# Patient Record
Sex: MALE | Race: WHITE | NOT HISPANIC OR LATINO | Employment: UNEMPLOYED | URBAN - METROPOLITAN AREA
[De-identification: names, ages, dates, MRNs, and addresses within clinical notes are randomized per-mention and may not be internally consistent; named-entity substitution may affect disease eponyms.]

---

## 2024-03-07 ENCOUNTER — APPOINTMENT (OUTPATIENT)
Dept: CARDIOLOGY | Facility: HOSPITAL | Age: 49
End: 2024-03-07
Payer: COMMERCIAL

## 2024-03-07 ENCOUNTER — HOSPITAL ENCOUNTER (INPATIENT)
Facility: HOSPITAL | Age: 49
LOS: 6 days | Discharge: HOME | End: 2024-03-13
Attending: EMERGENCY MEDICINE | Admitting: INTERNAL MEDICINE
Payer: COMMERCIAL

## 2024-03-07 ENCOUNTER — APPOINTMENT (OUTPATIENT)
Dept: GENERAL RADIOLOGY | Facility: HOSPITAL | Age: 49
End: 2024-03-07
Payer: COMMERCIAL

## 2024-03-07 DIAGNOSIS — J81.0 ACUTE PULMONARY EDEMA: ICD-10-CM

## 2024-03-07 DIAGNOSIS — E87.70 HYPERVOLEMIA, UNSPECIFIED HYPERVOLEMIA TYPE: ICD-10-CM

## 2024-03-07 DIAGNOSIS — R50.9 FEVER IN ADULT: ICD-10-CM

## 2024-03-07 DIAGNOSIS — J96.01 ACUTE HYPOXEMIC RESPIRATORY FAILURE: Primary | ICD-10-CM

## 2024-03-07 DIAGNOSIS — G93.41 ACUTE METABOLIC ENCEPHALOPATHY: ICD-10-CM

## 2024-03-07 DIAGNOSIS — J18.9 PNEUMONIA OF LEFT LOWER LOBE DUE TO INFECTIOUS ORGANISM: ICD-10-CM

## 2024-03-07 LAB
ALBUMIN SERPL-MCNC: 4 G/DL (ref 3.5–5.2)
ALBUMIN/GLOB SERPL: 1.4 G/DL
ALP SERPL-CCNC: 61 U/L (ref 39–117)
ALT SERPL W P-5'-P-CCNC: 15 U/L (ref 1–41)
AMPHET+METHAMPHET UR QL: NEGATIVE
ANION GAP SERPL CALCULATED.3IONS-SCNC: 10.6 MMOL/L (ref 5–15)
AORTIC ARCH: 3.7 CM
AORTIC DIMENSIONLESS INDEX: 0.7 (DI)
APTT PPP: 30 SECONDS (ref 22.7–35.4)
ARTERIAL PATENCY WRIST A: POSITIVE
ASCENDING AORTA: 3.5 CM
AST SERPL-CCNC: 18 U/L (ref 1–40)
ATMOSPHERIC PRESS: 747.8 MMHG
ATMOSPHERIC PRESS: 748 MMHG
ATMOSPHERIC PRESS: 748.1 MMHG
ATMOSPHERIC PRESS: 748.6 MMHG
ATMOSPHERIC PRESS: 749.8 MMHG
B PARAPERT DNA SPEC QL NAA+PROBE: NOT DETECTED
B PERT DNA SPEC QL NAA+PROBE: NOT DETECTED
BACTERIA UR QL AUTO: ABNORMAL /HPF
BARBITURATES UR QL SCN: NEGATIVE
BASE EXCESS BLDA CALC-SCNC: 4.1 MMOL/L (ref 0–2)
BASE EXCESS BLDA CALC-SCNC: 5.2 MMOL/L (ref 0–2)
BASE EXCESS BLDA CALC-SCNC: 5.2 MMOL/L (ref 0–2)
BASE EXCESS BLDA CALC-SCNC: 5.5 MMOL/L (ref 0–2)
BASE EXCESS BLDA CALC-SCNC: 6 MMOL/L (ref 0–2)
BASOPHILS # BLD AUTO: 0.04 10*3/MM3 (ref 0–0.2)
BASOPHILS NFR BLD AUTO: 0.5 % (ref 0–1.5)
BDY SITE: ABNORMAL
BENZODIAZ UR QL SCN: NEGATIVE
BH CV ECHO MEAS - ACS: 2.34 CM
BH CV ECHO MEAS - AO MAX PG: 14.4 MMHG
BH CV ECHO MEAS - AO MEAN PG: 6.9 MMHG
BH CV ECHO MEAS - AO ROOT DIAM: 3.8 CM
BH CV ECHO MEAS - AO V2 MAX: 190 CM/SEC
BH CV ECHO MEAS - AO V2 VTI: 27.8 CM
BH CV ECHO MEAS - AVA(I,D): 2.6 CM2
BH CV ECHO MEAS - EDV(CUBED): 151 ML
BH CV ECHO MEAS - EDV(MOD-SP2): 207 ML
BH CV ECHO MEAS - EDV(MOD-SP4): 172 ML
BH CV ECHO MEAS - EF(MOD-BP): 78.8 %
BH CV ECHO MEAS - EF(MOD-SP2): 76.8 %
BH CV ECHO MEAS - EF(MOD-SP4): 79.1 %
BH CV ECHO MEAS - ESV(CUBED): 25.2 ML
BH CV ECHO MEAS - ESV(MOD-SP2): 48 ML
BH CV ECHO MEAS - ESV(MOD-SP4): 36 ML
BH CV ECHO MEAS - FS: 44.9 %
BH CV ECHO MEAS - IVS/LVPW: 1.05 CM
BH CV ECHO MEAS - IVSD: 1.75 CM
BH CV ECHO MEAS - LAT PEAK E' VEL: 11.1 CM/SEC
BH CV ECHO MEAS - LV MASS(C)D: 431.6 GRAMS
BH CV ECHO MEAS - LV MAX PG: 5.1 MMHG
BH CV ECHO MEAS - LV MEAN PG: 3 MMHG
BH CV ECHO MEAS - LV V1 MAX: 113.3 CM/SEC
BH CV ECHO MEAS - LV V1 VTI: 18.4 CM
BH CV ECHO MEAS - LVIDD: 5.3 CM
BH CV ECHO MEAS - LVIDS: 2.9 CM
BH CV ECHO MEAS - LVOT AREA: 4 CM2
BH CV ECHO MEAS - LVOT DIAM: 2.26 CM
BH CV ECHO MEAS - LVPWD: 1.67 CM
BH CV ECHO MEAS - MED PEAK E' VEL: 5.7 CM/SEC
BH CV ECHO MEAS - MV A DUR: 0.18 SEC
BH CV ECHO MEAS - MV A MAX VEL: 80.9 CM/SEC
BH CV ECHO MEAS - MV DEC SLOPE: 405.4 CM/SEC2
BH CV ECHO MEAS - MV DEC TIME: 0.22 SEC
BH CV ECHO MEAS - MV E MAX VEL: 79.6 CM/SEC
BH CV ECHO MEAS - MV E/A: 0.98
BH CV ECHO MEAS - MV MAX PG: 4.2 MMHG
BH CV ECHO MEAS - MV MEAN PG: 2.17 MMHG
BH CV ECHO MEAS - MV P1/2T: 70.1 MSEC
BH CV ECHO MEAS - MV V2 VTI: 27.3 CM
BH CV ECHO MEAS - MVA(P1/2T): 3.1 CM2
BH CV ECHO MEAS - MVA(VTI): 2.7 CM2
BH CV ECHO MEAS - PA ACC TIME: 0.11 SEC
BH CV ECHO MEAS - PA V2 MAX: 131.9 CM/SEC
BH CV ECHO MEAS - QP/QS: 2.36
BH CV ECHO MEAS - RV MAX PG: 3.2 MMHG
BH CV ECHO MEAS - RV V1 MAX: 89.2 CM/SEC
BH CV ECHO MEAS - RV V1 VTI: 12.7 CM
BH CV ECHO MEAS - RVOT DIAM: 4.2 CM
BH CV ECHO MEAS - SUP REN AO DIAM: 3 CM
BH CV ECHO MEAS - SV(LVOT): 73.6 ML
BH CV ECHO MEAS - SV(MOD-SP2): 159 ML
BH CV ECHO MEAS - SV(MOD-SP4): 136 ML
BH CV ECHO MEAS - SV(RVOT): 173.4 ML
BH CV ECHO MEAS - TAPSE (>1.6): 2.7 CM
BH CV ECHO MEASUREMENTS AVERAGE E/E' RATIO: 9.48
BH CV XLRA - RV BASE: 3.8 CM
BH CV XLRA - RV LENGTH: 10.5 CM
BH CV XLRA - RV MID: 4.5 CM
BH CV XLRA - TDI S': 18.7 CM/SEC
BILIRUB SERPL-MCNC: 0.8 MG/DL (ref 0–1.2)
BILIRUB UR QL STRIP: NEGATIVE
BUN SERPL-MCNC: 17 MG/DL (ref 6–20)
BUN/CREAT SERPL: 19.1 (ref 7–25)
C PNEUM DNA NPH QL NAA+NON-PROBE: NOT DETECTED
CALCIUM SPEC-SCNC: 8.4 MG/DL (ref 8.6–10.5)
CANNABINOIDS SERPL QL: POSITIVE
CHLORIDE SERPL-SCNC: 95 MMOL/L (ref 98–107)
CLARITY UR: CLEAR
CO2 BLDA-SCNC: >34.54 MMOL/L (ref 23–27)
CO2 SERPL-SCNC: 31.4 MMOL/L (ref 22–29)
COCAINE UR QL: NEGATIVE
COLOR UR: ABNORMAL
CREAT SERPL-MCNC: 0.89 MG/DL (ref 0.76–1.27)
D DIMER PPP FEU-MCNC: 0.66 MCGFEU/ML (ref 0–0.5)
D-LACTATE SERPL-SCNC: 1.2 MMOL/L (ref 0.5–2)
DEPRECATED RDW RBC AUTO: 45.5 FL (ref 37–54)
DEVICE COMMENT: ABNORMAL
EGFRCR SERPLBLD CKD-EPI 2021: 106.4 ML/MIN/1.73
EOSINOPHIL # BLD AUTO: 0.03 10*3/MM3 (ref 0–0.4)
EOSINOPHIL NFR BLD AUTO: 0.3 % (ref 0.3–6.2)
ERYTHROCYTE [DISTWIDTH] IN BLOOD BY AUTOMATED COUNT: 13.5 % (ref 12.3–15.4)
FENTANYL UR-MCNC: NEGATIVE NG/ML
FLUAV SUBTYP SPEC NAA+PROBE: NOT DETECTED
FLUBV RNA ISLT QL NAA+PROBE: NOT DETECTED
GEN 5 2HR TROPONIN T REFLEX: 44 NG/L
GLOBULIN UR ELPH-MCNC: 2.9 GM/DL
GLUCOSE BLDC GLUCOMTR-MCNC: 120 MG/DL (ref 70–130)
GLUCOSE BLDC GLUCOMTR-MCNC: 128 MG/DL (ref 70–130)
GLUCOSE SERPL-MCNC: 124 MG/DL (ref 65–99)
GLUCOSE UR STRIP-MCNC: NEGATIVE MG/DL
GRAN CASTS URNS QL MICRO: ABNORMAL /LPF
HADV DNA SPEC NAA+PROBE: NOT DETECTED
HCO3 BLDA-SCNC: 35.7 MMOL/L (ref 22–28)
HCO3 BLDA-SCNC: 35.8 MMOL/L (ref 22–28)
HCO3 BLDA-SCNC: 36.8 MMOL/L (ref 22–28)
HCO3 BLDA-SCNC: 39.3 MMOL/L (ref 22–28)
HCO3 BLDA-SCNC: 39.7 MMOL/L (ref 22–28)
HCOV 229E RNA SPEC QL NAA+PROBE: NOT DETECTED
HCOV HKU1 RNA SPEC QL NAA+PROBE: NOT DETECTED
HCOV NL63 RNA SPEC QL NAA+PROBE: NOT DETECTED
HCOV OC43 RNA SPEC QL NAA+PROBE: NOT DETECTED
HCT VFR BLD AUTO: 51.1 % (ref 37.5–51)
HEMODILUTION: NO
HGB BLD-MCNC: 16.9 G/DL (ref 13–17.7)
HGB UR QL STRIP.AUTO: NEGATIVE
HMPV RNA NPH QL NAA+NON-PROBE: DETECTED
HPIV1 RNA ISLT QL NAA+PROBE: NOT DETECTED
HPIV2 RNA SPEC QL NAA+PROBE: NOT DETECTED
HPIV3 RNA NPH QL NAA+PROBE: NOT DETECTED
HPIV4 P GENE NPH QL NAA+PROBE: NOT DETECTED
HYALINE CASTS UR QL AUTO: ABNORMAL /LPF
IMM GRANULOCYTES # BLD AUTO: 0.05 10*3/MM3 (ref 0–0.05)
IMM GRANULOCYTES NFR BLD AUTO: 0.6 % (ref 0–0.5)
INHALED O2 CONCENTRATION: 100 %
INHALED O2 CONCENTRATION: 75 %
INR PPP: 1.1 (ref 0.9–1.1)
KETONES UR QL STRIP: NEGATIVE
L PNEUMO1 AG UR QL IA: NEGATIVE
LEUKOCYTE ESTERASE UR QL STRIP.AUTO: NEGATIVE
LYMPHOCYTES # BLD AUTO: 0.76 10*3/MM3 (ref 0.7–3.1)
LYMPHOCYTES NFR BLD AUTO: 8.8 % (ref 19.6–45.3)
Lab: ABNORMAL
M PNEUMO IGG SER IA-ACNC: NOT DETECTED
MCH RBC QN AUTO: 30.7 PG (ref 26.6–33)
MCHC RBC AUTO-ENTMCNC: 33.1 G/DL (ref 31.5–35.7)
MCV RBC AUTO: 92.7 FL (ref 79–97)
METHADONE UR QL SCN: NEGATIVE
MODALITY: ABNORMAL
MONOCYTES # BLD AUTO: 0.82 10*3/MM3 (ref 0.1–0.9)
MONOCYTES NFR BLD AUTO: 9.5 % (ref 5–12)
MRSA DNA SPEC QL NAA+PROBE: NORMAL
NEUTROPHILS NFR BLD AUTO: 6.93 10*3/MM3 (ref 1.7–7)
NEUTROPHILS NFR BLD AUTO: 80.3 % (ref 42.7–76)
NITRITE UR QL STRIP: NEGATIVE
NOTIFIED WHO: ABNORMAL
NRBC BLD AUTO-RTO: 0.3 /100 WBC (ref 0–0.2)
NT-PROBNP SERPL-MCNC: 1526 PG/ML (ref 0–450)
O2 A-A PPRESDIFF RESPIRATORY: 0.1 MMHG
O2 A-A PPRESDIFF RESPIRATORY: 0.2 MMHG
OPIATES UR QL: NEGATIVE
OXYCODONE UR QL SCN: NEGATIVE
PCO2 BLDA: 73.2 MM HG (ref 35–45)
PCO2 BLDA: 76.7 MM HG (ref 35–45)
PCO2 BLDA: 79 MM HG (ref 35–45)
PCO2 BLDA: 89.8 MM HG (ref 35–45)
PCO2 BLDA: 99.1 MM HG (ref 35–45)
PEEP RESPIRATORY: 15 CM[H2O]
PEEP RESPIRATORY: 8 CM[H2O]
PH BLDA: 7.21 PH UNITS (ref 7.35–7.45)
PH BLDA: 7.25 PH UNITS (ref 7.35–7.45)
PH BLDA: 7.26 PH UNITS (ref 7.35–7.45)
PH BLDA: 7.29 PH UNITS (ref 7.35–7.45)
PH BLDA: 7.3 PH UNITS (ref 7.35–7.45)
PH UR STRIP.AUTO: 6 [PH] (ref 5–8)
PLATELET # BLD AUTO: 164 10*3/MM3 (ref 140–450)
PMV BLD AUTO: 9.5 FL (ref 6–12)
PO2 BLDA: 61.8 MM HG (ref 80–100)
PO2 BLDA: 70.2 MM HG (ref 80–100)
PO2 BLDA: 70.8 MM HG (ref 80–100)
PO2 BLDA: 71.1 MM HG (ref 80–100)
PO2 BLDA: 73.5 MM HG (ref 80–100)
POTASSIUM SERPL-SCNC: 4.3 MMOL/L (ref 3.5–5.2)
PROCALCITONIN SERPL-MCNC: 0.21 NG/ML (ref 0–0.25)
PROT SERPL-MCNC: 6.9 G/DL (ref 6–8.5)
PROT UR QL STRIP: ABNORMAL
PROTHROMBIN TIME: 14.3 SECONDS (ref 11.7–14.2)
RBC # BLD AUTO: 5.51 10*6/MM3 (ref 4.14–5.8)
RBC # UR STRIP: ABNORMAL /HPF
READ BACK: YES
REF LAB TEST METHOD: ABNORMAL
RHINOVIRUS RNA SPEC NAA+PROBE: NOT DETECTED
RSV RNA NPH QL NAA+NON-PROBE: NOT DETECTED
S PNEUM AG SPEC QL LA: NEGATIVE
SAO2 % BLDCOA: 86 % (ref 92–98.5)
SAO2 % BLDCOA: 88.3 % (ref 92–98.5)
SAO2 % BLDCOA: 89.2 % (ref 92–98.5)
SAO2 % BLDCOA: 90.8 % (ref 92–98.5)
SAO2 % BLDCOA: 91.9 % (ref 92–98.5)
SARS-COV-2 RNA NPH QL NAA+NON-PROBE: NOT DETECTED
SET MECH RESP RATE: 16
SET MECH RESP RATE: 16
SET MECH RESP RATE: 25
SET MECH RESP RATE: 25
SINUS: 3.9 CM
SODIUM SERPL-SCNC: 137 MMOL/L (ref 136–145)
SP GR UR STRIP: 1.02 (ref 1–1.03)
SQUAMOUS #/AREA URNS HPF: ABNORMAL /HPF
STJ: 3.2 CM
TOTAL RATE: 20 BREATHS/MINUTE
TOTAL RATE: 25 BREATHS/MINUTE
TOTAL RATE: 26 BREATHS/MINUTE
TROPONIN T DELTA: 6 NG/L
TROPONIN T SERPL HS-MCNC: 38 NG/L
UROBILINOGEN UR QL STRIP: ABNORMAL
VENTILATOR MODE: ABNORMAL
VT ON VENT VENT: 500 ML
VT ON VENT VENT: 500 ML
WBC # UR STRIP: ABNORMAL /HPF
WBC NRBC COR # BLD AUTO: 8.63 10*3/MM3 (ref 3.4–10.8)

## 2024-03-07 PROCEDURE — 94660 CPAP INITIATION&MGMT: CPT

## 2024-03-07 PROCEDURE — 99285 EMERGENCY DEPT VISIT HI MDM: CPT

## 2024-03-07 PROCEDURE — 36415 COLL VENOUS BLD VENIPUNCTURE: CPT

## 2024-03-07 PROCEDURE — 94760 N-INVAS EAR/PLS OXIMETRY 1: CPT

## 2024-03-07 PROCEDURE — 85025 COMPLETE CBC W/AUTO DIFF WBC: CPT | Performed by: EMERGENCY MEDICINE

## 2024-03-07 PROCEDURE — 36600 WITHDRAWAL OF ARTERIAL BLOOD: CPT | Performed by: HOSPITALIST

## 2024-03-07 PROCEDURE — 25010000002 FUROSEMIDE PER 20 MG: Performed by: EMERGENCY MEDICINE

## 2024-03-07 PROCEDURE — 94799 UNLISTED PULMONARY SVC/PX: CPT

## 2024-03-07 PROCEDURE — 0202U NFCT DS 22 TRGT SARS-COV-2: CPT | Performed by: EMERGENCY MEDICINE

## 2024-03-07 PROCEDURE — 25010000002 FUROSEMIDE PER 20 MG: Performed by: HOSPITALIST

## 2024-03-07 PROCEDURE — 85379 FIBRIN DEGRADATION QUANT: CPT | Performed by: EMERGENCY MEDICINE

## 2024-03-07 PROCEDURE — 36600 WITHDRAWAL OF ARTERIAL BLOOD: CPT

## 2024-03-07 PROCEDURE — 83605 ASSAY OF LACTIC ACID: CPT | Performed by: EMERGENCY MEDICINE

## 2024-03-07 PROCEDURE — 80053 COMPREHEN METABOLIC PANEL: CPT | Performed by: EMERGENCY MEDICINE

## 2024-03-07 PROCEDURE — 87641 MR-STAPH DNA AMP PROBE: CPT | Performed by: EMERGENCY MEDICINE

## 2024-03-07 PROCEDURE — 84484 ASSAY OF TROPONIN QUANT: CPT | Performed by: EMERGENCY MEDICINE

## 2024-03-07 PROCEDURE — 25010000002 METHYLPREDNISOLONE PER 40 MG: Performed by: INTERNAL MEDICINE

## 2024-03-07 PROCEDURE — 25010000002 ENOXAPARIN PER 10 MG: Performed by: INTERNAL MEDICINE

## 2024-03-07 PROCEDURE — 87449 NOS EACH ORGANISM AG IA: CPT | Performed by: INTERNAL MEDICINE

## 2024-03-07 PROCEDURE — 80307 DRUG TEST PRSMV CHEM ANLYZR: CPT | Performed by: EMERGENCY MEDICINE

## 2024-03-07 PROCEDURE — 71045 X-RAY EXAM CHEST 1 VIEW: CPT

## 2024-03-07 PROCEDURE — 87040 BLOOD CULTURE FOR BACTERIA: CPT | Performed by: EMERGENCY MEDICINE

## 2024-03-07 PROCEDURE — 83880 ASSAY OF NATRIURETIC PEPTIDE: CPT | Performed by: EMERGENCY MEDICINE

## 2024-03-07 PROCEDURE — 93306 TTE W/DOPPLER COMPLETE: CPT

## 2024-03-07 PROCEDURE — 25010000002 ACETAZOLAMIDE PER 500 MG: Performed by: INTERNAL MEDICINE

## 2024-03-07 PROCEDURE — 93306 TTE W/DOPPLER COMPLETE: CPT | Performed by: INTERNAL MEDICINE

## 2024-03-07 PROCEDURE — 81001 URINALYSIS AUTO W/SCOPE: CPT | Performed by: INTERNAL MEDICINE

## 2024-03-07 PROCEDURE — 25510000001 PERFLUTREN (DEFINITY) 8.476 MG IN SODIUM CHLORIDE (PF) 0.9 % 10 ML INJECTION: Performed by: INTERNAL MEDICINE

## 2024-03-07 PROCEDURE — 94640 AIRWAY INHALATION TREATMENT: CPT

## 2024-03-07 PROCEDURE — 94664 DEMO&/EVAL PT USE INHALER: CPT

## 2024-03-07 PROCEDURE — 25010000002 CEFTRIAXONE PER 250 MG: Performed by: EMERGENCY MEDICINE

## 2024-03-07 PROCEDURE — 85730 THROMBOPLASTIN TIME PARTIAL: CPT | Performed by: EMERGENCY MEDICINE

## 2024-03-07 PROCEDURE — 94761 N-INVAS EAR/PLS OXIMETRY MLT: CPT

## 2024-03-07 PROCEDURE — 93010 ELECTROCARDIOGRAM REPORT: CPT | Performed by: INTERNAL MEDICINE

## 2024-03-07 PROCEDURE — 82803 BLOOD GASES ANY COMBINATION: CPT

## 2024-03-07 PROCEDURE — 85610 PROTHROMBIN TIME: CPT | Performed by: EMERGENCY MEDICINE

## 2024-03-07 PROCEDURE — 82803 BLOOD GASES ANY COMBINATION: CPT | Performed by: EMERGENCY MEDICINE

## 2024-03-07 PROCEDURE — 82948 REAGENT STRIP/BLOOD GLUCOSE: CPT

## 2024-03-07 PROCEDURE — 84145 PROCALCITONIN (PCT): CPT | Performed by: EMERGENCY MEDICINE

## 2024-03-07 PROCEDURE — 36600 WITHDRAWAL OF ARTERIAL BLOOD: CPT | Performed by: EMERGENCY MEDICINE

## 2024-03-07 PROCEDURE — 82803 BLOOD GASES ANY COMBINATION: CPT | Performed by: HOSPITALIST

## 2024-03-07 PROCEDURE — 93005 ELECTROCARDIOGRAM TRACING: CPT | Performed by: EMERGENCY MEDICINE

## 2024-03-07 RX ORDER — IPRATROPIUM BROMIDE AND ALBUTEROL SULFATE 2.5; .5 MG/3ML; MG/3ML
3 SOLUTION RESPIRATORY (INHALATION) ONCE
Status: COMPLETED | OUTPATIENT
Start: 2024-03-07 | End: 2024-03-07

## 2024-03-07 RX ORDER — BISACODYL 5 MG/1
5 TABLET, DELAYED RELEASE ORAL DAILY PRN
Status: DISCONTINUED | OUTPATIENT
Start: 2024-03-07 | End: 2024-03-13 | Stop reason: HOSPADM

## 2024-03-07 RX ORDER — FENTANYL CITRATE 50 UG/ML
100 INJECTION, SOLUTION INTRAMUSCULAR; INTRAVENOUS ONCE
Status: DISCONTINUED | OUTPATIENT
Start: 2024-03-07 | End: 2024-03-09

## 2024-03-07 RX ORDER — NITROGLYCERIN 0.4 MG/1
0.4 TABLET SUBLINGUAL
Status: DISCONTINUED | OUTPATIENT
Start: 2024-03-07 | End: 2024-03-13 | Stop reason: HOSPADM

## 2024-03-07 RX ORDER — ACETAMINOPHEN 650 MG/1
650 SUPPOSITORY RECTAL EVERY 4 HOURS PRN
Status: DISCONTINUED | OUTPATIENT
Start: 2024-03-07 | End: 2024-03-13 | Stop reason: HOSPADM

## 2024-03-07 RX ORDER — METHYLPREDNISOLONE SODIUM SUCCINATE 40 MG/ML
40 INJECTION, POWDER, LYOPHILIZED, FOR SOLUTION INTRAMUSCULAR; INTRAVENOUS EVERY 12 HOURS
Status: DISCONTINUED | OUTPATIENT
Start: 2024-03-07 | End: 2024-03-13 | Stop reason: HOSPADM

## 2024-03-07 RX ORDER — SODIUM CHLORIDE 0.9 % (FLUSH) 0.9 %
10 SYRINGE (ML) INJECTION AS NEEDED
Status: DISCONTINUED | OUTPATIENT
Start: 2024-03-07 | End: 2024-03-13 | Stop reason: HOSPADM

## 2024-03-07 RX ORDER — BISACODYL 10 MG
10 SUPPOSITORY, RECTAL RECTAL DAILY PRN
Status: DISCONTINUED | OUTPATIENT
Start: 2024-03-07 | End: 2024-03-13 | Stop reason: HOSPADM

## 2024-03-07 RX ORDER — ONDANSETRON 4 MG/1
4 TABLET, ORALLY DISINTEGRATING ORAL EVERY 6 HOURS PRN
Status: DISCONTINUED | OUTPATIENT
Start: 2024-03-07 | End: 2024-03-13 | Stop reason: HOSPADM

## 2024-03-07 RX ORDER — FUROSEMIDE 10 MG/ML
40 INJECTION INTRAMUSCULAR; INTRAVENOUS EVERY 12 HOURS
Status: DISCONTINUED | OUTPATIENT
Start: 2024-03-07 | End: 2024-03-09

## 2024-03-07 RX ORDER — FENTANYL CITRATE-0.9 % NACL/PF 10 MCG/ML
50-300 PLASTIC BAG, INJECTION (ML) INTRAVENOUS
Status: DISCONTINUED | OUTPATIENT
Start: 2024-03-07 | End: 2024-03-07

## 2024-03-07 RX ORDER — ENOXAPARIN SODIUM 100 MG/ML
40 INJECTION SUBCUTANEOUS DAILY
Status: DISCONTINUED | OUTPATIENT
Start: 2024-03-07 | End: 2024-03-08

## 2024-03-07 RX ORDER — IPRATROPIUM BROMIDE AND ALBUTEROL SULFATE 2.5; .5 MG/3ML; MG/3ML
3 SOLUTION RESPIRATORY (INHALATION)
Status: DISCONTINUED | OUTPATIENT
Start: 2024-03-07 | End: 2024-03-13 | Stop reason: HOSPADM

## 2024-03-07 RX ORDER — POLYETHYLENE GLYCOL 3350 17 G/17G
17 POWDER, FOR SOLUTION ORAL DAILY PRN
Status: DISCONTINUED | OUTPATIENT
Start: 2024-03-07 | End: 2024-03-13 | Stop reason: HOSPADM

## 2024-03-07 RX ORDER — ONDANSETRON 2 MG/ML
4 INJECTION INTRAMUSCULAR; INTRAVENOUS EVERY 6 HOURS PRN
Status: DISCONTINUED | OUTPATIENT
Start: 2024-03-07 | End: 2024-03-13 | Stop reason: HOSPADM

## 2024-03-07 RX ORDER — AMOXICILLIN 250 MG
2 CAPSULE ORAL 2 TIMES DAILY
Status: DISCONTINUED | OUTPATIENT
Start: 2024-03-07 | End: 2024-03-13 | Stop reason: HOSPADM

## 2024-03-07 RX ORDER — ACETAZOLAMIDE 500 MG/5ML
500 INJECTION, POWDER, LYOPHILIZED, FOR SOLUTION INTRAVENOUS ONCE
Status: COMPLETED | OUTPATIENT
Start: 2024-03-07 | End: 2024-03-07

## 2024-03-07 RX ORDER — FUROSEMIDE 10 MG/ML
80 INJECTION INTRAMUSCULAR; INTRAVENOUS ONCE
Status: COMPLETED | OUTPATIENT
Start: 2024-03-07 | End: 2024-03-07

## 2024-03-07 RX ORDER — ACETAMINOPHEN 325 MG/1
650 TABLET ORAL EVERY 4 HOURS PRN
Status: DISCONTINUED | OUTPATIENT
Start: 2024-03-07 | End: 2024-03-13 | Stop reason: HOSPADM

## 2024-03-07 RX ORDER — ALUMINA, MAGNESIA, AND SIMETHICONE 2400; 2400; 240 MG/30ML; MG/30ML; MG/30ML
15 SUSPENSION ORAL EVERY 6 HOURS PRN
Status: DISCONTINUED | OUTPATIENT
Start: 2024-03-07 | End: 2024-03-13 | Stop reason: HOSPADM

## 2024-03-07 RX ADMIN — IPRATROPIUM BROMIDE AND ALBUTEROL SULFATE 3 ML: 2.5; .5 SOLUTION RESPIRATORY (INHALATION) at 18:44

## 2024-03-07 RX ADMIN — METHYLPREDNISOLONE SODIUM SUCCINATE 40 MG: 40 INJECTION, POWDER, FOR SOLUTION INTRAMUSCULAR; INTRAVENOUS at 16:06

## 2024-03-07 RX ADMIN — CEFTRIAXONE 2000 MG: 2 INJECTION, POWDER, FOR SOLUTION INTRAMUSCULAR; INTRAVENOUS at 15:57

## 2024-03-07 RX ADMIN — ENOXAPARIN SODIUM 40 MG: 100 INJECTION SUBCUTANEOUS at 17:41

## 2024-03-07 RX ADMIN — IPRATROPIUM BROMIDE AND ALBUTEROL SULFATE 3 ML: .5; 3 SOLUTION RESPIRATORY (INHALATION) at 14:27

## 2024-03-07 RX ADMIN — IPRATROPIUM BROMIDE AND ALBUTEROL SULFATE 3 ML: 2.5; .5 SOLUTION RESPIRATORY (INHALATION) at 22:18

## 2024-03-07 RX ADMIN — PERFLUTREN 3 ML: 6.52 INJECTION, SUSPENSION INTRAVENOUS at 18:12

## 2024-03-07 RX ADMIN — ACETAZOLAMIDE 500 MG: 500 INJECTION, POWDER, LYOPHILIZED, FOR SOLUTION INTRAVENOUS at 16:07

## 2024-03-07 RX ADMIN — FUROSEMIDE 80 MG: 10 INJECTION, SOLUTION INTRAMUSCULAR; INTRAVENOUS at 14:38

## 2024-03-07 RX ADMIN — FUROSEMIDE 80 MG: 10 INJECTION, SOLUTION INTRAMUSCULAR; INTRAVENOUS at 20:02

## 2024-03-07 NOTE — ED PROVIDER NOTES
EMERGENCY DEPARTMENT ENCOUNTER  Room Number:  12/12  PCP: Provider, No Known  Independent Historians: Patient and sister      HPI:  Chief Complaint: had concerns including Shortness of Breath.     A complete HPI/ROS/PMH/PSH/SH/FH are unobtainable due to: Acutely ill and not able to provide history    Chronic or social conditions impacting patient care (Social Determinants of Health): Economic Stability (Employment, Income, Expenses, Debt, Medical Bills, Financial Resource Strain)      Context: Josh Hinojosa is a 48 y.o. male with a medical history of acute respiratory failure, cardiomyopathy, and smoking who presents to the ED c/o acute dyspnea developing over the last week.  Patient reports some cough, fevers, and weight gain.  Patient's sister tells me that he has put on quite a bit of weight in the last 1 to 2 weeks.  She also tells me that he previously was cared for at [facility] and had to be intubated twice for respiratory failure in the past with diuresis of up to 50 pounds.  Patient told his sister that his medications were discontinued but the circumstances around those are not quite clear but he has been off them for several weeks to a month.      Review of prior external notes (non-ED) -and- Review of prior external test results outside of this encounter:  Not yet available      PAST MEDICAL HISTORY  Active Ambulatory Problems     Diagnosis Date Noted    No Active Ambulatory Problems     Resolved Ambulatory Problems     Diagnosis Date Noted    No Resolved Ambulatory Problems     No Additional Past Medical History         PAST SURGICAL HISTORY  No past surgical history on file.      FAMILY HISTORY  No family history on file.      SOCIAL HISTORY  Social History     Socioeconomic History    Marital status: Single         ALLERGIES  Patient has no known allergies.      REVIEW OF SYSTEMS  Review of Systems  Included in HPI  All systems reviewed and negative except for those discussed in  HPI.      PHYSICAL EXAM    I have reviewed the triage vital signs and nursing notes.    ED Triage Vitals   Temp Heart Rate Resp BP SpO2   -- 03/07/24 1404 03/07/24 1402 -- 03/07/24 1402    97 (!) 30  (!) 49 %      Temp src Heart Rate Source Patient Position BP Location FiO2 (%)   -- 03/07/24 1404 -- -- --    Monitor          Physical Exam    Physical Exam   Constitutional: Respiratory distress noted.  Patient is drowsy but is able to speak with me and is currently protecting his airway.  HENT:  Head: Normocephalic and atraumatic.   Oropharynx: Mucous membranes are moist.   Eyes: . No scleral icterus. No conjunctival pallor.  Neck: Normal range of motion. Neck supple.   Cardiovascular: Pink warm and well perfused throughout.    Pulmonary/Chest: Tachypneic with severely diminished breath sounds throughout.  Abdominal: Soft.  Very large habitus with pitting edema of the lower abdominal wall.  Musculoskeletal: Moves all extremities equally but somewhat weakly.  Lower extremity edema bilaterally noted.  Neurological: Alert and oriented.  Nausea but following all exam requests.  Skin: Skin is pink, warm, and dry.   Psychiatric: Limited evaluation  Nursing note and vitals reviewed.             LAB RESULTS  Recent Results (from the past 24 hour(s))   Comprehensive Metabolic Panel    Collection Time: 03/07/24  2:10 PM    Specimen: Blood   Result Value Ref Range    Glucose 124 (H) 65 - 99 mg/dL    BUN 17 6 - 20 mg/dL    Creatinine 0.89 0.76 - 1.27 mg/dL    Sodium 137 136 - 145 mmol/L    Potassium 4.3 3.5 - 5.2 mmol/L    Chloride 95 (L) 98 - 107 mmol/L    CO2 31.4 (H) 22.0 - 29.0 mmol/L    Calcium 8.4 (L) 8.6 - 10.5 mg/dL    Total Protein 6.9 6.0 - 8.5 g/dL    Albumin 4.0 3.5 - 5.2 g/dL    ALT (SGPT) 15 1 - 41 U/L    AST (SGOT) 18 1 - 40 U/L    Alkaline Phosphatase 61 39 - 117 U/L    Total Bilirubin 0.8 0.0 - 1.2 mg/dL    Globulin 2.9 gm/dL    A/G Ratio 1.4 g/dL    BUN/Creatinine Ratio 19.1 7.0 - 25.0    Anion Gap 10.6 5.0 -  15.0 mmol/L    eGFR 106.4 >60.0 mL/min/1.73   Protime-INR    Collection Time: 03/07/24  2:10 PM    Specimen: Blood   Result Value Ref Range    Protime 14.3 (H) 11.7 - 14.2 Seconds    INR 1.10 0.90 - 1.10   aPTT    Collection Time: 03/07/24  2:10 PM    Specimen: Blood   Result Value Ref Range    PTT 30.0 22.7 - 35.4 seconds   BNP    Collection Time: 03/07/24  2:10 PM    Specimen: Blood   Result Value Ref Range    proBNP 1,526.0 (H) 0.0 - 450.0 pg/mL   D-dimer, Quantitative    Collection Time: 03/07/24  2:10 PM    Specimen: Blood   Result Value Ref Range    D-Dimer, Quantitative 0.66 (H) 0.00 - 0.50 MCGFEU/mL   High Sensitivity Troponin T    Collection Time: 03/07/24  2:10 PM    Specimen: Blood   Result Value Ref Range    HS Troponin T 38 (H) <22 ng/L   Lactic Acid, Plasma    Collection Time: 03/07/24  2:10 PM    Specimen: Blood   Result Value Ref Range    Lactate 1.2 0.5 - 2.0 mmol/L   Procalcitonin    Collection Time: 03/07/24  2:10 PM    Specimen: Blood   Result Value Ref Range    Procalcitonin 0.21 0.00 - 0.25 ng/mL   CBC Auto Differential    Collection Time: 03/07/24  2:10 PM    Specimen: Blood   Result Value Ref Range    WBC 8.63 3.40 - 10.80 10*3/mm3    RBC 5.51 4.14 - 5.80 10*6/mm3    Hemoglobin 16.9 13.0 - 17.7 g/dL    Hematocrit 51.1 (H) 37.5 - 51.0 %    MCV 92.7 79.0 - 97.0 fL    MCH 30.7 26.6 - 33.0 pg    MCHC 33.1 31.5 - 35.7 g/dL    RDW 13.5 12.3 - 15.4 %    RDW-SD 45.5 37.0 - 54.0 fl    MPV 9.5 6.0 - 12.0 fL    Platelets 164 140 - 450 10*3/mm3    Neutrophil % 80.3 (H) 42.7 - 76.0 %    Lymphocyte % 8.8 (L) 19.6 - 45.3 %    Monocyte % 9.5 5.0 - 12.0 %    Eosinophil % 0.3 0.3 - 6.2 %    Basophil % 0.5 0.0 - 1.5 %    Immature Grans % 0.6 (H) 0.0 - 0.5 %    Neutrophils, Absolute 6.93 1.70 - 7.00 10*3/mm3    Lymphocytes, Absolute 0.76 0.70 - 3.10 10*3/mm3    Monocytes, Absolute 0.82 0.10 - 0.90 10*3/mm3    Eosinophils, Absolute 0.03 0.00 - 0.40 10*3/mm3    Basophils, Absolute 0.04 0.00 - 0.20 10*3/mm3     Immature Grans, Absolute 0.05 0.00 - 0.05 10*3/mm3    nRBC 0.3 (H) 0.0 - 0.2 /100 WBC   Blood Gas, Arterial -    Collection Time: 03/07/24  2:20 PM    Specimen: Arterial Blood   Result Value Ref Range    Site Right Radial     Omar's Test Positive     pH, Arterial 7.263 (C) 7.350 - 7.450 pH units    pCO2, Arterial 79.0 (C) 35.0 - 45.0 mm Hg    pO2, Arterial 61.8 (L) 80.0 - 100.0 mm Hg    HCO3, Arterial 35.7 (H) 22.0 - 28.0 mmol/L    Base Excess, Arterial 4.1 (H) 0.0 - 2.0 mmol/L    O2 Saturation, Arterial 86.0 (L) 92.0 - 98.5 %    A-a DO2 0.1 mmHg    CO2 Content >34.54 (H) 23 - 27 mmol/L    Barometric Pressure for Blood Gas 749.8000 mmHg    Modality BiPap     FIO2 100 %    Ventilator Mode NIV     Set Tidal Volume 500     Set Mech Resp Rate 16     Rate 26 Breaths/minute    Notified Diamond borges     Read Back Yes     Notified Time      Hemodilution No     Device Comment sats 93. epap 6 min p 12 max p 28    ECG 12 Lead Dyspnea    Collection Time: 03/07/24  2:33 PM   Result Value Ref Range    QT Interval 402 ms    QTC Interval 506 ms         RADIOLOGY  XR Chest 1 View    Result Date: 3/7/2024  XR CHEST 1 VW-  HISTORY: Male who is 48 years-old, respiratory failure  TECHNIQUE: Frontal views of the chest  COMPARISON: None available  FINDINGS: The heart is enlarged. Pulmonary vasculature is congested. Opacities at the mid to lower lungs may reflect edema and/or pneumonia, follow-up recommended. No large pleural effusion. No pneumothorax is seen, apices are partly obscured by the chin. No acute osseous process.      As described.  This report was finalized on 3/7/2024 2:26 PM by Dr. Babak Mckeon M.D on Workstation: [id number]         MEDICATIONS GIVEN IN ER  Medications   sodium chloride 0.9 % flush 10 mL (has no administration in time range)   cefTRIAXone (ROCEPHIN) 2,000 mg in sodium chloride 0.9 % 100 mL IVPB-VTB (has no administration in time range)   furosemide (LASIX) injection 40 mg (has no administration in time  range)   ipratropium-albuterol (DUO-NEB) nebulizer solution 3 mL (3 mL Nebulization Not Given 3/7/24 1452)   ipratropium-albuterol (DUO-NEB) nebulizer solution 3 mL (has no administration in time range)   methylPREDNISolone sodium succinate (SOLU-Medrol) injection 40 mg (has no administration in time range)   nitroglycerin (NITROSTAT) SL tablet 0.4 mg (has no administration in time range)   aluminum-magnesium hydroxide-simethicone (MAALOX MAX) 400-400-40 MG/5ML suspension 15 mL (has no administration in time range)   sennosides-docusate (PERICOLACE) 8.6-50 MG per tablet 2 tablet (has no administration in time range)     And   polyethylene glycol (MIRALAX) packet 17 g (has no administration in time range)     And   bisacodyl (DULCOLAX) EC tablet 5 mg (has no administration in time range)     And   bisacodyl (DULCOLAX) suppository 10 mg (has no administration in time range)   acetaminophen (TYLENOL) tablet 650 mg (has no administration in time range)     Or   acetaminophen (TYLENOL) suppository 650 mg (has no administration in time range)   ondansetron ODT (ZOFRAN-ODT) disintegrating tablet 4 mg (has no administration in time range)     Or   ondansetron (ZOFRAN) injection 4 mg (has no administration in time range)   Enoxaparin Sodium (LOVENOX) syringe 40 mg (has no administration in time range)   Potassium Replacement - Follow Nurse / BPA Driven Protocol (has no administration in time range)   Magnesium Standard Dose Replacement - Follow Nurse / BPA Driven Protocol (has no administration in time range)   Phosphorus Replacement - Follow Nurse / BPA Driven Protocol (has no administration in time range)   Calcium Replacement - Follow Nurse / BPA Driven Protocol (has no administration in time range)   cefTRIAXone (ROCEPHIN) 2,000 mg in sodium chloride 0.9 % 100 mL IVPB-VTB (has no administration in time range)   ipratropium-albuterol (DUO-NEB) nebulizer solution 3 mL (3 mL Nebulization Given 3/7/24 1427)   furosemide  (LASIX) injection 80 mg (80 mg Intravenous Given 3/7/24 4561)         ORDERS PLACED DURING THIS VISIT:  Orders Placed This Encounter   Procedures    Respiratory Panel PCR w/COVID-19(SARS-CoV-2) ABEL/DEBBY/FER/PAD/COR/MILDRED In-House, NP Swab in UTM/VTM, 2 HR TAT - Swab, Nasopharynx    Blood Culture - Blood,    Blood Culture - Blood,    MRSA Screen, PCR (Inpatient) - Swab, Nares    S. Pneumo Ag Urine or CSF - Urine, Urine, Clean Catch    Legionella Antigen, Urine - Urine, Urine, Clean Catch    Respiratory Culture - Sputum, Cough    XR Chest 1 View    Comprehensive Metabolic Panel    Protime-INR    aPTT    Blood Gas, Arterial -    BNP    D-dimer, Quantitative    High Sensitivity Troponin T    Lactic Acid, Plasma    Procalcitonin    Urine Drug Screen - Urine, Clean Catch    CBC Auto Differential    Blood Gas, Arterial -    Urinalysis With Microscopic If Indicated (No Culture) - Urine, Clean Catch    High Sensitivity Troponin T 2Hr    NPO Diet NPO Type: Strict NPO    Monitor Blood Pressure    Vital Signs Per hospital policy    Telemetry - Place Orders & Notify Provider of Results When Patient Experiences Acute Chest Pain, Dysrhythmia or Respiratory Distress    Continuous Pulse Oximetry    Height and weight    Daily Weights    Intake and Output    Oral Care - Patient Not on NPPV & Not Intubated    Target Arousal Level RASS 0 to -1    If Patient has BG of < 80 and is symptomatic but not on an IV insulin protocol then use the Adult Hypoglycemia Treatment Orders.    Saline Lock    Place Order to Consult Intensivist For Critical Care Management (If Patient Not Admitted to Cardiology for Primary Cardiology Condition)    Use Mobility Guidelines for Advancement of Activity    Code Status and Medical Interventions:    Pulmonology (on-call MD unless specified)    Inpatient Consult to Case Management     NIPPV (CPAP or BIPAP)    POC Glucose Q4H    ECG 12 Lead Dyspnea    Adult Transthoracic Echo Complete W/ Cont if  Necessary Per Protocol    Insert Peripheral IV    Inpatient Admission    CBC & Differential         OUTPATIENT MEDICATION MANAGEMENT:  Current Facility-Administered Medications Ordered in Epic   Medication Dose Route Frequency Provider Last Rate Last Admin    acetaminophen (TYLENOL) tablet 650 mg  650 mg Oral Q4H PRN Emery Hauser MD        Or    acetaminophen (TYLENOL) suppository 650 mg  650 mg Rectal Q4H PRN Emery Hauser MD        aluminum-magnesium hydroxide-simethicone (MAALOX MAX) 400-400-40 MG/5ML suspension 15 mL  15 mL Oral Q6H PRN Emery Hauser MD        sennosides-docusate (PERICOLACE) 8.6-50 MG per tablet 2 tablet  2 tablet Oral BID Emery Hauser MD        And    polyethylene glycol (MIRALAX) packet 17 g  17 g Oral Daily PRN Emery Hauser MD        And    bisacodyl (DULCOLAX) EC tablet 5 mg  5 mg Oral Daily PRN Emery Hauser MD        And    bisacodyl (DULCOLAX) suppository 10 mg  10 mg Rectal Daily PRN Emery Hauser MD        Calcium Replacement - Follow Nurse / BPA Driven Protocol   Does not apply Emery Abrams MD        cefTRIAXone (ROCEPHIN) 2,000 mg in sodium chloride 0.9 % 100 mL IVPB-VTB  2,000 mg Intravenous Once Deny Richey MD        [START ON 3/8/2024] cefTRIAXone (ROCEPHIN) 2,000 mg in sodium chloride 0.9 % 100 mL IVPB-VTB  2,000 mg Intravenous Q24H Emery Hauser MD        Enoxaparin Sodium (LOVENOX) syringe 40 mg  40 mg Subcutaneous Daily Emery Hauser MD        furosemide (LASIX) injection 40 mg  40 mg Intravenous Q12H Emery Hauser MD        ipratropium-albuterol (DUO-NEB) nebulizer solution 3 mL  3 mL Nebulization Q4H - RT Emery Hauser MD        ipratropium-albuterol (DUO-NEB) nebulizer solution 3 mL  3 mL Nebulization Q2H PRN Emery Hauser MD        Magnesium Standard Dose Replacement - Follow Nurse / BPA Driven Protocol   Does not apply MATAN Emery Hauser MD        methylPREDNISolone  sodium succinate (SOLU-Medrol) injection 40 mg  40 mg Intravenous Q12H mEery Hauser MD        nitroglycerin (NITROSTAT) SL tablet 0.4 mg  0.4 mg Sublingual Q5 Min PRN Emery Hauser MD        ondansetron ODT (ZOFRAN-ODT) disintegrating tablet 4 mg  4 mg Oral Q6H PRN Emery Hauser MD        Or    ondansetron (ZOFRAN) injection 4 mg  4 mg Intravenous Q6H PRN Emery Hauser MD        Phosphorus Replacement - Follow Nurse / BPA Driven Protocol   Does not apply PRN Emery Hauser MD        Potassium Replacement - Follow Nurse / BPA Driven Protocol   Does not apply Emery Abrams MD        sodium chloride 0.9 % flush 10 mL  10 mL Intravenous PRN Deny Richey MD         No current [facility]-ordered outpatient medications on file.         PROCEDURES  Procedures      Total critical care time: Approximately 45 minutes    Due to a high probability of clinically significant, life threatening deterioration, the patient required my highest level of preparedness to intervene emergently and I personally spent this critical care time directly and personally managing the patient. This critical care time included obtaining a history; examining the patient; vital sign monitoring; ordering and review of studies; arranging urgent treatment with development of a management plan; evaluation of patient's response to treatment; frequent reassessment; and, discussions with other providers.  Indication: Acute hypoxemic respiratory failure  This critical care time was performed to assess and manage the high probability of imminent, life-threatening deterioration that could result in multi-organ failure. It was exclusive of separately billable procedures and treating other patients and teaching time.    Please see MDM section and the rest of the note for further information on patient assessment and treatment.        PROGRESS, DATA ANALYSIS, CONSULTS, AND MEDICAL DECISION MAKING  All labs have been  independently interpreted by me.  All radiology studies have been reviewed by me. All EKG's have been independently viewed and interpreted by me.  Discussion below represents my analysis of pertinent findings related to patient's condition, differential diagnosis, treatment plan and final disposition.    Differential diagnosis:   My differential diagnosis for dyspnea includes but is not limited to:  Asthma, COPD, pneumonia, pulmonary embolus, acute respiratory distress syndrome, pneumothorax, pleural effusion, pulmonary fibrosis, congestive heart failure, myocardial infarction, DKA, uremia, acidosis, sepsis, anemia, drug related, hyperventilation, CNS disease      Clinical Scores:                  ED Course as of 03/07/24 1454   Thu Mar 07, 2024   1421 WBC: 8.63 [RS]   1421 Hemoglobin: 16.9 [RS]   1421 Platelets: 164 [RS]   1421 Immature Grans, Absolute: 0.05 [RS]   1422 ABG reviewed: pH 7.26, pCO2 79, pO2 61.8 [RS]   1422 RADIOLOGY      Study: Single view chest  Findings: Copious interstitial findings left greater than right suggesting heart failure plus or minus overlying pneumonia  I independently viewed and interpreted these images contemporaneously with treatment.    [RS]   1424 Late entry:   BiPAP initiated shortly after patient arrival [RS]   1425 Patient tolerating BiPAP well.  Work of breathing improved.  Mental status improved.  Plan admission to the ICU.  We will cover with ceftriaxone and check MRSA swab for left lower lobe pneumonia.  Furosemide rather than IV fluids ordered given the patient's general volume overloaded status. [RS]   1426 pH, Arterial(!!): 7.263 [RS]   1426 pO2, Arterial(!): 61.8 [RS]   1426 pCO2, Arterial(!!): 79.0 [RS]   1436 CONSULT        Provider: Dr. Hauser - ICU    Discussion: Reviewed patient history, ED presentation and evaluation as well as response to therapies and therapies initiated in the ED.  Agreeable to accept the patient to the ICU for further evaluation and  treatment    Agreeable c treatment and planned disposition.         [RS]   1438 EKG           EKG time: 1433  Rhythm/Rate: Sinus, 95  P waves and MO: JACKIE within normal limits  QRS, axis: IVCD consistent with right bundle branch block  ST and T waves: ST/T wave repolarization abnormality without clear evidence of STEMI    Interpreted Contemporaneously by me, independently viewed  Comparison: Unavailable   [RS]   1449 Lactate: 1.2 [RS]   1449 D-Dimer, Quant(!): 0.66 [RS]   1449 INR: 1.10 [RS]   1454 BUN: 17 [RS]   1454 Creatinine: 0.89 [RS]   1454 Sodium: 137 [RS]   1454 Potassium: 4.3 [RS]   1454 ALT (SGPT): 15 [RS]   1454 AST (SGOT): 18 [RS]   1454 Total Bilirubin: 0.8 [RS]   1454 HS Troponin T(!): 38 [RS]   1454 proBNP(!): 1,526.0 [RS]      ED Course User Index  [RS] Deny Richey MD         Prescription drug monitoring program review:     AS OF 14:54 EST VITALS:    BP - 142/97  HR - 95  TEMP - (!) 101.1 °F (38.4 °C) (Tympanic)  O2 SATS - 94%    COMPLEXITY OF CARE  The patient requires admission.      DIAGNOSIS  Final diagnoses:   Acute hypoxemic respiratory failure   Fever in adult   Acute metabolic encephalopathy   Acute pulmonary edema   Pneumonia of left lower lobe due to infectious organism   Hypervolemia, unspecified hypervolemia type         DISPOSITION  ED Disposition       ED Disposition   Decision to Admit    Condition   --    Comment   Level of Care: Critical Care [6]   Diagnosis: Acute hypoxemic respiratory failure [[id number]]   Admitting Physician: ANGELA GONZALEZ [[id number]]   Certification: I Certify That Inpatient Hospital Services Are Medically Necessary For Greater Than 2 Midnights                    ADMISSION    Discussed treatment plan and reason for admission with pt/family and admitting physician.  Pt/family voiced understanding of the plan for admission for further testing/treatment as needed.       Please note that portions of this document were completed with a voice recognition  program.    Note Disclaimer: At [facility], we believe that sharing information builds trust and better relationships. You are receiving this note because you recently visited [facility]. It is possible you will see health information before a provider has talked with you about it. This kind of information can be easy to misunderstand. To help you fully understand what it means for your health, we urge you to discuss this note with your provider.         Deny Richey MD  03/07/24 3198

## 2024-03-07 NOTE — ED TRIAGE NOTES
Patient from home via PV reporting increasing shortness of breath for 5 days. Patient's sister reports his oxygen saturation was 65% on room air. States he uses a Bipap, has a history of pulmonary edema. Patient placed on oxygen and taken to room immediately. Reports patient developed a cough with bloody sputum last night.

## 2024-03-07 NOTE — PLAN OF CARE
Goal Outcome Evaluation:                   Admitted to CICU. Patient is arousable and follows commands with aggressive stimulation. ABG slowly improving. Bolanos placed.

## 2024-03-07 NOTE — H&P
Patient Identification:  Name: Josh Hinojosa  Age: 48 y.o.  Sex: male  :  1975  MRN: [id number]                     Primary Care Physician: Provider, No Known    Reason for visit:   Altered mental status and shortness of breath    History of Present Illness:   Josh is a 48-year-old male who presents with altered mental status and shortness of breath.  Has a history of cardiomyopathy and continues to smoke cigarettes and marijuana on a regular basis.  Has had multiple hospitalizations predominantly at [facility] for respiratory failure and pulmonary edema.  Family says that he has been intubated twice for respiratory failure.  They said that he was doing okay up until recently when medicines were discontinued but unclear if this was due to medicines being stopped by his primary care physician at [place] or if he just stopped following up and they did not continue to refill his medicines.  Confused with hypercapnic failure and chest x-ray shows congestion left greater than right.  Discussed with the ER physician.  Unable to obtain any history from patient due to altered mental status.  On NIPPV and increased work of breathing has stabilized.        Past Medical History:  No past medical history on file.  Past Surgical History:  No past surgical history on file.   Home Meds:  (Not in a hospital admission)      Allergies:  No Known Allergies  Immunizations:  Immunization History   Administered Date(s) Administered    COVID-19 (PFIZER) Purple Cap Monovalent 2021, 2021     Social History:   Social History     Social History Narrative    Not on file     Social History     Tobacco Use    Smoking status: Not on file    Smokeless tobacco: Not on file   Substance Use Topics    Alcohol use: Not on file     Family History:  No family history on file.     Review of Systems  Unobtainable with altered mental status    Objective:  tMax 24 hrs: Temp (24hrs), Av.1 °F (38.4 °C),  "Min:101.1 °F (38.4 °C), Max:101.1 °F (38.4 °C)    Vitals Ranges:   Temp:  [101.1 °F (38.4 °C)] 101.1 °F (38.4 °C)  Heart Rate:  [91-97] 95  Resp:  [30] 30  BP: (139-142)/(91-97) 142/97      Exam:  /97   Pulse 95   Temp (!) 101.1 °F (38.4 °C) (Tympanic)   Resp (!) 30   SpO2 94%     GENERAL APPEARANCE:   Well developed  Morbidly obese  Appears acutely ill  EYES:    PERRL                                                                           Conjunctiva normal  Sclera non-icteric.  HENT:   Atraumatic, normocephalic  External ears and nose normal  Moist mucous membranes and no ulcers  NECK:  Thyroid not enlarged  Trachea midline   RESPIRATORY:    Mildly labored breathing   Diminished bilateral breath sounds  Bibasilar rales. No wheezing  No dullness  CARDIOVASCULAR:    RRR  Normal S1, S2  No murmur  Lower extremity edema: +    Peripheral pulses present.    GI:   Bowel sounds normal  Abdomen soft , non-distended, non-tender  MUSCULOSKELETAL:  Normal movement of extremities  No tenderness, no deformities  No clubbing or cyanosis   Skin:    No visible rashes  No palpable nodules.  No mottling.   PSYCHIATRIC:  Lethargic  NEUROLOGIC:   Cranial nerves II through XII grossly intact.  Sensation intact.  .     Data Review:  Labs reviewed        Invalid input(s): \"LABALBU\", \"PROT\"  Results from last 7 days   Lab Units 03/07/24  1410   WBC 10*3/mm3 8.63   HEMOGLOBIN g/dL 16.9   HEMATOCRIT % 51.1*   PLATELETS 10*3/mm3 164     Results from last 7 days   Lab Units 03/07/24  1420   PH, ARTERIAL pH units 7.263*   PO2 ART mm Hg 61.8*   PCO2, ARTERIAL mm Hg 79.0*   HCO3 ART mmol/L 35.7*         Procalitonin Results:            Imaging reviewed  Chest x-ray reviewed shows vascular congestion, cardiomegaly and left lower airspace disease.          Assessment:  Acute hypoxemic and hypercapnic respiratory failure with respiratory acidosis  Pulmonary vascular congestion with acute on chronic CHF  Volume overload  Metabolic " encephalopathy  Left lower lobe infiltrate/pneumonia versus asymmetric edema  Persistent tobacco and marijuana use  Fever 101        Plan:  Admit to intensive care.  Continue NIPPV for work of breathing and repeat ABG for follow-up of hypercapnic failure.  If worsens may require intubation and mechanical ventilation.  Diuresis for volume overload and pulmonary edema.  Awaiting lactic acid and procalcitonin level.  Normal white blood cell count but with left shift, altered mental status and questionable left lower infiltrate will empirically start antibiotic for potential infectious component.  Does not need fluid bolus for sepsis as he is volume overloaded.  Order blood, urine and sputum cultures.  Check Legionella and strep pneumo urinary antigens.  2D echo for evaluation of heart failure.  Control blood pressure.  Control glucose.  DVT prophylaxis.        Emery Hauser MD  [facility], [facility]  Pulmonary and Critical Care Medicine    3/7/2024  14:39 EST

## 2024-03-07 NOTE — PROGRESS NOTES
Clinical Pharmacy Services: Medication History    Josh Hinojosa is a 48 y.o. male presenting to [facility] for   Chief Complaint   Patient presents with    Shortness of Breath       He  has no past medical history on file.    Allergies as of 03/07/2024    (No Known Allergies)       Medication information was obtained from: Patient   Pharmacy and Phone Number:     Prior to Admission Medications       None              Medication notes: Patient confirmed he is not taking any medications at this time.     This medication list is complete to the best of my knowledge as of 3/7/2024    Please call if questions.    Harshil Rivera  Medication History Technician  [phone]    3/7/2024 15:24 EST

## 2024-03-08 LAB
ANION GAP SERPL CALCULATED.3IONS-SCNC: 6 MMOL/L (ref 5–15)
ARTERIAL PATENCY WRIST A: POSITIVE
ARTERIAL PATENCY WRIST A: POSITIVE
ATMOSPHERIC PRESS: 746.3 MMHG
ATMOSPHERIC PRESS: 747.5 MMHG
BASE EXCESS BLDA CALC-SCNC: 6.2 MMOL/L (ref 0–2)
BASE EXCESS BLDA CALC-SCNC: 6.7 MMOL/L (ref 0–2)
BDY SITE: ABNORMAL
BDY SITE: ABNORMAL
BUN SERPL-MCNC: 20 MG/DL (ref 6–20)
BUN/CREAT SERPL: 20.4 (ref 7–25)
CALCIUM SPEC-SCNC: 8.7 MG/DL (ref 8.6–10.5)
CHLORIDE SERPL-SCNC: 95 MMOL/L (ref 98–107)
CO2 BLDA-SCNC: >34.54 MMOL/L (ref 23–27)
CO2 BLDA-SCNC: >34.54 MMOL/L (ref 23–27)
CO2 SERPL-SCNC: 36 MMOL/L (ref 22–29)
CREAT SERPL-MCNC: 0.98 MG/DL (ref 0.76–1.27)
DEPRECATED RDW RBC AUTO: 47.5 FL (ref 37–54)
DEVICE COMMENT: ABNORMAL
DEVICE COMMENT: ABNORMAL
EGFRCR SERPLBLD CKD-EPI 2021: 95.7 ML/MIN/1.73
ERYTHROCYTE [DISTWIDTH] IN BLOOD BY AUTOMATED COUNT: 13.7 % (ref 12.3–15.4)
GLUCOSE BLDC GLUCOMTR-MCNC: 116 MG/DL (ref 70–130)
GLUCOSE BLDC GLUCOMTR-MCNC: 128 MG/DL (ref 70–130)
GLUCOSE BLDC GLUCOMTR-MCNC: 130 MG/DL (ref 70–130)
GLUCOSE BLDC GLUCOMTR-MCNC: 134 MG/DL (ref 70–130)
GLUCOSE SERPL-MCNC: 133 MG/DL (ref 65–99)
HCO3 BLDA-SCNC: 36.2 MMOL/L (ref 22–28)
HCO3 BLDA-SCNC: 37.5 MMOL/L (ref 22–28)
HCT VFR BLD AUTO: 53.7 % (ref 37.5–51)
HEMODILUTION: NO
HEMODILUTION: NO
HGB BLD-MCNC: 17.7 G/DL (ref 13–17.7)
INHALED O2 CONCENTRATION: 50 %
INHALED O2 CONCENTRATION: 70 %
INSPIRATORY TIME: 1
Lab: ABNORMAL
Lab: ABNORMAL
MCH RBC QN AUTO: 30.9 PG (ref 26.6–33)
MCHC RBC AUTO-ENTMCNC: 33 G/DL (ref 31.5–35.7)
MCV RBC AUTO: 93.9 FL (ref 79–97)
MODALITY: ABNORMAL
MODALITY: ABNORMAL
NOTIFIED WHO: ABNORMAL
NOTIFIED WHO: ABNORMAL
O2 A-A PPRESDIFF RESPIRATORY: 0.2 MMHG
O2 A-A PPRESDIFF RESPIRATORY: 0.2 MMHG
PCO2 BLDA: 68.2 MM HG (ref 35–45)
PCO2 BLDA: 75.2 MM HG (ref 35–45)
PEEP RESPIRATORY: 12 CM[H2O]
PH BLDA: 7.31 PH UNITS (ref 7.35–7.45)
PH BLDA: 7.33 PH UNITS (ref 7.35–7.45)
PLATELET # BLD AUTO: 183 10*3/MM3 (ref 140–450)
PMV BLD AUTO: 9.8 FL (ref 6–12)
PO2 BLDA: 60.6 MM HG (ref 80–100)
PO2 BLDA: 72.1 MM HG (ref 80–100)
POTASSIUM SERPL-SCNC: 4.3 MMOL/L (ref 3.5–5.2)
QT INTERVAL: 402 MS
QTC INTERVAL: 506 MS
RBC # BLD AUTO: 5.72 10*6/MM3 (ref 4.14–5.8)
READ BACK: YES
READ BACK: YES
SAO2 % BLDCOA: 87.8 % (ref 92–98.5)
SAO2 % BLDCOA: 91.6 % (ref 92–98.5)
SET MECH RESP RATE: 28
SET MECH RESP RATE: 28
SODIUM SERPL-SCNC: 137 MMOL/L (ref 136–145)
TOTAL RATE: 28 BREATHS/MINUTE
TOTAL RATE: 28 BREATHS/MINUTE
VENTILATOR MODE: ABNORMAL
WBC NRBC COR # BLD AUTO: 8.46 10*3/MM3 (ref 3.4–10.8)

## 2024-03-08 PROCEDURE — 94664 DEMO&/EVAL PT USE INHALER: CPT

## 2024-03-08 PROCEDURE — 94761 N-INVAS EAR/PLS OXIMETRY MLT: CPT

## 2024-03-08 PROCEDURE — 94799 UNLISTED PULMONARY SVC/PX: CPT

## 2024-03-08 PROCEDURE — 85027 COMPLETE CBC AUTOMATED: CPT

## 2024-03-08 PROCEDURE — 82803 BLOOD GASES ANY COMBINATION: CPT

## 2024-03-08 PROCEDURE — 36600 WITHDRAWAL OF ARTERIAL BLOOD: CPT

## 2024-03-08 PROCEDURE — 82948 REAGENT STRIP/BLOOD GLUCOSE: CPT

## 2024-03-08 PROCEDURE — 25010000002 ENOXAPARIN PER 10 MG: Performed by: INTERNAL MEDICINE

## 2024-03-08 PROCEDURE — 80048 BASIC METABOLIC PNL TOTAL CA: CPT

## 2024-03-08 PROCEDURE — 25010000002 FUROSEMIDE PER 20 MG: Performed by: INTERNAL MEDICINE

## 2024-03-08 PROCEDURE — 25010000002 METHYLPREDNISOLONE PER 40 MG: Performed by: INTERNAL MEDICINE

## 2024-03-08 PROCEDURE — 94660 CPAP INITIATION&MGMT: CPT

## 2024-03-08 PROCEDURE — 25010000002 CEFTRIAXONE PER 250 MG: Performed by: INTERNAL MEDICINE

## 2024-03-08 RX ORDER — ENOXAPARIN SODIUM 100 MG/ML
40 INJECTION SUBCUTANEOUS EVERY 12 HOURS
Status: DISCONTINUED | OUTPATIENT
Start: 2024-03-08 | End: 2024-03-13 | Stop reason: HOSPADM

## 2024-03-08 RX ADMIN — IPRATROPIUM BROMIDE AND ALBUTEROL SULFATE 3 ML: 2.5; .5 SOLUTION RESPIRATORY (INHALATION) at 07:58

## 2024-03-08 RX ADMIN — CEFTRIAXONE 2000 MG: 2 INJECTION, POWDER, FOR SOLUTION INTRAMUSCULAR; INTRAVENOUS at 16:53

## 2024-03-08 RX ADMIN — ACETAMINOPHEN 325MG 650 MG: 325 TABLET ORAL at 16:53

## 2024-03-08 RX ADMIN — METHYLPREDNISOLONE SODIUM SUCCINATE 40 MG: 40 INJECTION, POWDER, FOR SOLUTION INTRAMUSCULAR; INTRAVENOUS at 03:29

## 2024-03-08 RX ADMIN — ENOXAPARIN SODIUM 40 MG: 100 INJECTION SUBCUTANEOUS at 20:29

## 2024-03-08 RX ADMIN — ACETAMINOPHEN 325MG 650 MG: 325 TABLET ORAL at 20:36

## 2024-03-08 RX ADMIN — DOCUSATE SODIUM 50MG AND SENNOSIDES 8.6MG 2 TABLET: 8.6; 5 TABLET, FILM COATED ORAL at 20:32

## 2024-03-08 RX ADMIN — METHYLPREDNISOLONE SODIUM SUCCINATE 40 MG: 40 INJECTION, POWDER, FOR SOLUTION INTRAMUSCULAR; INTRAVENOUS at 16:53

## 2024-03-08 RX ADMIN — FUROSEMIDE 40 MG: 10 INJECTION, SOLUTION INTRAMUSCULAR; INTRAVENOUS at 20:29

## 2024-03-08 RX ADMIN — IPRATROPIUM BROMIDE AND ALBUTEROL SULFATE 3 ML: 2.5; .5 SOLUTION RESPIRATORY (INHALATION) at 21:14

## 2024-03-08 RX ADMIN — ENOXAPARIN SODIUM 40 MG: 100 INJECTION SUBCUTANEOUS at 08:02

## 2024-03-08 RX ADMIN — IPRATROPIUM BROMIDE AND ALBUTEROL SULFATE 3 ML: 2.5; .5 SOLUTION RESPIRATORY (INHALATION) at 11:40

## 2024-03-08 RX ADMIN — IPRATROPIUM BROMIDE AND ALBUTEROL SULFATE 3 ML: 2.5; .5 SOLUTION RESPIRATORY (INHALATION) at 23:29

## 2024-03-08 RX ADMIN — IPRATROPIUM BROMIDE AND ALBUTEROL SULFATE 3 ML: 2.5; .5 SOLUTION RESPIRATORY (INHALATION) at 03:06

## 2024-03-08 RX ADMIN — IPRATROPIUM BROMIDE AND ALBUTEROL SULFATE 3 ML: 2.5; .5 SOLUTION RESPIRATORY (INHALATION) at 15:33

## 2024-03-08 RX ADMIN — FUROSEMIDE 40 MG: 10 INJECTION, SOLUTION INTRAMUSCULAR; INTRAVENOUS at 08:02

## 2024-03-08 NOTE — PAYOR COMM NOTE
Josh Hinojosa (48 y.o. Male)                            ATTENTION; INPATIENT AUTH REQUEST - ICD 10 - J96.01 - J18.9                       FACILITY NPI - [id number] - [facility], [address] [place]. [address]                    PHYSICIAN NPI - [id number] - DR. GONZALEZ [address] [place]. [address]                    REPLY TO UR DEPT  [phone] OR CALL   AFTAB MUÑIZ LPRAYMOND           Date of Birth   1975    Social Security Number       Address   [address] [address]    Home Phone   [phone]    MRN   [id number]       Sabianist   Nondenominational    Marital Status   Single                            Admission Date   3/7/24    Admission Type   Emergency    Admitting Provider   Emery Gonzalez MD    Attending Provider   Emery Gonzalez MD    Department, Room/Bed   [facility] CARDIAC INTENSIVE CARE, 3010/1       Discharge Date       Discharge Disposition       Discharge Destination                                 Attending Provider: Emery Gonzalez MD    Allergies: No Known Allergies    Isolation: Enh Drop/Con, Contact   Infection: COVID (rule out) (03/07/24), Human Metapneumovirus  (03/07/24)   Code Status: CPR    Ht: --   Wt: 162 kg (358 lb 0.4 oz)    Admission Cmt: None   Principal Problem: Acute hypoxemic respiratory failure [J96.01]                   Active Insurance as of 3/7/2024       Primary Coverage       Payor Plan Insurance Group Employer/Plan Group    [facility] [facility]        Payor Plan Address Payor Plan Phone Number Payor Plan Fax Number Effective Dates    [address] [address]   3/7/2024 - None Entered    [facility] [phone]         Subscriber Name Subscriber Birth Date Member ID       JOSH HINOJOSA 1975 [id number]                     Emergency Contacts        (Rel.) Home Phone Work Phone Mobile Phone    elizabeth washington (Sister) -- -- [phone]                 History & Physical         Emery Hauser MD at 24 1438                      Patient Identification:  Name: Josh Hinojosa  Age: 48 y.o.  Sex: male  :  1975  MRN: [id number]                     Primary Care Physician: Provider, No Known    Reason for visit:   Altered mental status and shortness of breath    History of Present Illness:   Josh is a 48-year-old male who presents with altered mental status and shortness of breath.  Has a history of cardiomyopathy and continues to smoke cigarettes and marijuana on a regular basis.  Has had multiple hospitalizations predominantly at [facility] for respiratory failure and pulmonary edema.  Family says that he has been intubated twice for respiratory failure.  They said that he was doing okay up until recently when medicines were discontinued but unclear if this was due to medicines being stopped by his primary care physician at [facility] or if he just stopped following up and they did not continue to refill his medicines.  Confused with hypercapnic failure and chest x-ray shows congestion left greater than right.  Discussed with the ER physician.  Unable to obtain any history from patient due to altered mental status.  On NIPPV and increased work of breathing has stabilized.        Past Medical History:  No past medical history on file.  Past Surgical History:  No past surgical history on file.   Home Meds:  (Not in a hospital admission)      Allergies:  No Known Allergies  Immunizations:  Immunization History   Administered Date(s) Administered    COVID-19 (PFIZER) Purple Cap Monovalent 2021, 2021     Social History:   Social History     Social History Narrative    Not on file     Social History     Tobacco Use    Smoking status: Not on file    Smokeless tobacco: Not on file   Substance Use Topics    Alcohol use: Not on file     Family History:  No family history on file.     Review of Systems  Unobtainable with altered mental  "status    Objective:  tMax 24 hrs: Temp (24hrs), Av.1 °F (38.4 °C), Min:101.1 °F (38.4 °C), Max:101.1 °F (38.4 °C)    Vitals Ranges:   Temp:  [101.1 °F (38.4 °C)] 101.1 °F (38.4 °C)  Heart Rate:  [91-97] 95  Resp:  [30] 30  BP: (139-142)/(91-97) 142/97      Exam:  /97   Pulse 95   Temp (!) 101.1 °F (38.4 °C) (Tympanic)   Resp (!) 30   SpO2 94%     GENERAL APPEARANCE:   Well developed  Morbidly obese  Appears acutely ill  EYES:    PERRL                                                                           Conjunctiva normal  Sclera non-icteric.  HENT:   Atraumatic, normocephalic  External ears and nose normal  Moist mucous membranes and no ulcers  NECK:  Thyroid not enlarged  Trachea midline   RESPIRATORY:    Mildly labored breathing   Diminished bilateral breath sounds  Bibasilar rales. No wheezing  No dullness  CARDIOVASCULAR:    RRR  Normal S1, S2  No murmur  Lower extremity edema: +    Peripheral pulses present.    GI:   Bowel sounds normal  Abdomen soft , non-distended, non-tender  MUSCULOSKELETAL:  Normal movement of extremities  No tenderness, no deformities  No clubbing or cyanosis   Skin:    No visible rashes  No palpable nodules.  No mottling.   PSYCHIATRIC:  Lethargic  NEUROLOGIC:   Cranial nerves II through XII grossly intact.  Sensation intact.  .     Data Review:  Labs reviewed        Invalid input(s): \"LABALBU\", \"PROT\"  Results from last 7 days   Lab Units 24  1410   WBC 10*3/mm3 8.63   HEMOGLOBIN g/dL 16.9   HEMATOCRIT % 51.1*   PLATELETS 10*3/mm3 164     Results from last 7 days   Lab Units 24  1420   PH, ARTERIAL pH units 7.263*   PO2 ART mm Hg 61.8*   PCO2, ARTERIAL mm Hg 79.0*   HCO3 ART mmol/L 35.7*         Procalitonin Results:            Imaging reviewed  Chest x-ray reviewed shows vascular congestion, cardiomegaly and left lower airspace disease.          Assessment:  Acute hypoxemic and hypercapnic respiratory failure with respiratory acidosis  Pulmonary " vascular congestion with acute on chronic CHF  Volume overload  Metabolic encephalopathy  Left lower lobe infiltrate/pneumonia versus asymmetric edema  Persistent tobacco and marijuana use  Fever 101        Plan:  Admit to intensive care.  Continue NIPPV for work of breathing and repeat ABG for follow-up of hypercapnic failure.  If worsens may require intubation and mechanical ventilation.  Diuresis for volume overload and pulmonary edema.  Awaiting lactic acid and procalcitonin level.  Normal white blood cell count but with left shift, altered mental status and questionable left lower infiltrate will empirically start antibiotic for potential infectious component.  Does not need fluid bolus for sepsis as he is volume overloaded.  Order blood, urine and sputum cultures.  Check Legionella and strep pneumo urinary antigens.  2D echo for evaluation of heart failure.  Control blood pressure.  Control glucose.  DVT prophylaxis.        Emery Hauser MD  [facility], [facility]  Pulmonary and Critical Care Medicine    3/7/2024  14:39 EST    Electronically signed by Emery Hauser MD at 03/07/24 1532          Emergency Department Notes        Deny Richey MD at 03/07/24 1407           EMERGENCY DEPARTMENT ENCOUNTER  Room Number:  12/12  PCP: Provider, No Known  Independent Historians: Patient and sister      HPI:  Chief Complaint: had concerns including Shortness of Breath.     A complete HPI/ROS/PMH/PSH/SH/FH are unobtainable due to: Acutely ill and not able to provide history    Chronic or social conditions impacting patient care (Social Determinants of Health): Economic Stability (Employment, Income, Expenses, Debt, Medical Bills, Financial Resource Strain)      Context: Josh Hinojosa is a 48 y.o. male with a medical history of acute respiratory failure, cardiomyopathy, and smoking who presents to the ED c/o acute dyspnea developing over the last week.  Patient reports some cough, fevers, and weight gain.   Patient's sister tells me that he has put on quite a bit of weight in the last 1 to 2 weeks.  She also tells me that he previously was cared for at [facility] and had to be intubated twice for respiratory failure in the past with diuresis of up to 50 pounds.  Patient told his sister that his medications were discontinued but the circumstances around those are not quite clear but he has been off them for several weeks to a month.      Review of prior external notes (non-ED) -and- Review of prior external test results outside of this encounter:  Not yet available      PAST MEDICAL HISTORY  Active Ambulatory Problems     Diagnosis Date Noted    No Active Ambulatory Problems     Resolved Ambulatory Problems     Diagnosis Date Noted    No Resolved Ambulatory Problems     No Additional Past Medical History         PAST SURGICAL HISTORY  No past surgical history on file.      FAMILY HISTORY  No family history on file.      SOCIAL HISTORY  Social History     Socioeconomic History    Marital status: Single         ALLERGIES  Patient has no known allergies.      REVIEW OF SYSTEMS  Review of Systems  Included in HPI  All systems reviewed and negative except for those discussed in HPI.      PHYSICAL EXAM    I have reviewed the triage vital signs and nursing notes.    ED Triage Vitals   Temp Heart Rate Resp BP SpO2   -- 03/07/24 1404 03/07/24 1402 -- 03/07/24 1402    97 (!) 30  (!) 49 %      Temp src Heart Rate Source Patient Position BP Location FiO2 (%)   -- 03/07/24 1404 -- -- --    Monitor          Physical Exam    Physical Exam   Constitutional: Respiratory distress noted.  Patient is drowsy but is able to speak with me and is currently protecting his airway.  HENT:  Head: Normocephalic and atraumatic.   Oropharynx: Mucous membranes are moist.   Eyes: . No scleral icterus. No conjunctival pallor.  Neck: Normal range of motion. Neck supple.   Cardiovascular: Pink warm and well perfused throughout.     Pulmonary/Chest: Tachypneic with severely diminished breath sounds throughout.  Abdominal: Soft.  Very large habitus with pitting edema of the lower abdominal wall.  Musculoskeletal: Moves all extremities equally but somewhat weakly.  Lower extremity edema bilaterally noted.  Neurological: Alert and oriented.  Nausea but following all exam requests.  Skin: Skin is pink, warm, and dry.   Psychiatric: Limited evaluation  Nursing note and vitals reviewed.             LAB RESULTS  Recent Results (from the past 24 hour(s))   Comprehensive Metabolic Panel    Collection Time: 03/07/24  2:10 PM    Specimen: Blood   Result Value Ref Range    Glucose 124 (H) 65 - 99 mg/dL    BUN 17 6 - 20 mg/dL    Creatinine 0.89 0.76 - 1.27 mg/dL    Sodium 137 136 - 145 mmol/L    Potassium 4.3 3.5 - 5.2 mmol/L    Chloride 95 (L) 98 - 107 mmol/L    CO2 31.4 (H) 22.0 - 29.0 mmol/L    Calcium 8.4 (L) 8.6 - 10.5 mg/dL    Total Protein 6.9 6.0 - 8.5 g/dL    Albumin 4.0 3.5 - 5.2 g/dL    ALT (SGPT) 15 1 - 41 U/L    AST (SGOT) 18 1 - 40 U/L    Alkaline Phosphatase 61 39 - 117 U/L    Total Bilirubin 0.8 0.0 - 1.2 mg/dL    Globulin 2.9 gm/dL    A/G Ratio 1.4 g/dL    BUN/Creatinine Ratio 19.1 7.0 - 25.0    Anion Gap 10.6 5.0 - 15.0 mmol/L    eGFR 106.4 >60.0 mL/min/1.73   Protime-INR    Collection Time: 03/07/24  2:10 PM    Specimen: Blood   Result Value Ref Range    Protime 14.3 (H) 11.7 - 14.2 Seconds    INR 1.10 0.90 - 1.10   aPTT    Collection Time: 03/07/24  2:10 PM    Specimen: Blood   Result Value Ref Range    PTT 30.0 22.7 - 35.4 seconds   BNP    Collection Time: 03/07/24  2:10 PM    Specimen: Blood   Result Value Ref Range    proBNP 1,526.0 (H) 0.0 - 450.0 pg/mL   D-dimer, Quantitative    Collection Time: 03/07/24  2:10 PM    Specimen: Blood   Result Value Ref Range    D-Dimer, Quantitative 0.66 (H) 0.00 - 0.50 MCGFEU/mL   High Sensitivity Troponin T    Collection Time: 03/07/24  2:10 PM    Specimen: Blood   Result Value Ref Range    HS  Troponin T 38 (H) <22 ng/L   Lactic Acid, Plasma    Collection Time: 03/07/24  2:10 PM    Specimen: Blood   Result Value Ref Range    Lactate 1.2 0.5 - 2.0 mmol/L   Procalcitonin    Collection Time: 03/07/24  2:10 PM    Specimen: Blood   Result Value Ref Range    Procalcitonin 0.21 0.00 - 0.25 ng/mL   CBC Auto Differential    Collection Time: 03/07/24  2:10 PM    Specimen: Blood   Result Value Ref Range    WBC 8.63 3.40 - 10.80 10*3/mm3    RBC 5.51 4.14 - 5.80 10*6/mm3    Hemoglobin 16.9 13.0 - 17.7 g/dL    Hematocrit 51.1 (H) 37.5 - 51.0 %    MCV 92.7 79.0 - 97.0 fL    MCH 30.7 26.6 - 33.0 pg    MCHC 33.1 31.5 - 35.7 g/dL    RDW 13.5 12.3 - 15.4 %    RDW-SD 45.5 37.0 - 54.0 fl    MPV 9.5 6.0 - 12.0 fL    Platelets 164 140 - 450 10*3/mm3    Neutrophil % 80.3 (H) 42.7 - 76.0 %    Lymphocyte % 8.8 (L) 19.6 - 45.3 %    Monocyte % 9.5 5.0 - 12.0 %    Eosinophil % 0.3 0.3 - 6.2 %    Basophil % 0.5 0.0 - 1.5 %    Immature Grans % 0.6 (H) 0.0 - 0.5 %    Neutrophils, Absolute 6.93 1.70 - 7.00 10*3/mm3    Lymphocytes, Absolute 0.76 0.70 - 3.10 10*3/mm3    Monocytes, Absolute 0.82 0.10 - 0.90 10*3/mm3    Eosinophils, Absolute 0.03 0.00 - 0.40 10*3/mm3    Basophils, Absolute 0.04 0.00 - 0.20 10*3/mm3    Immature Grans, Absolute 0.05 0.00 - 0.05 10*3/mm3    nRBC 0.3 (H) 0.0 - 0.2 /100 WBC   Blood Gas, Arterial -    Collection Time: 03/07/24  2:20 PM    Specimen: Arterial Blood   Result Value Ref Range    Site Right Radial     Omar's Test Positive     pH, Arterial 7.263 (C) 7.350 - 7.450 pH units    pCO2, Arterial 79.0 (C) 35.0 - 45.0 mm Hg    pO2, Arterial 61.8 (L) 80.0 - 100.0 mm Hg    HCO3, Arterial 35.7 (H) 22.0 - 28.0 mmol/L    Base Excess, Arterial 4.1 (H) 0.0 - 2.0 mmol/L    O2 Saturation, Arterial 86.0 (L) 92.0 - 98.5 %    A-a DO2 0.1 mmHg    CO2 Content >34.54 (H) 23 - 27 mmol/L    Barometric Pressure for Blood Gas 749.8000 mmHg    Modality BiPap     FIO2 100 %    Ventilator Mode NIV     Set Tidal Volume 500     Set  [facility] Resp Rate 16     Rate 26 Breaths/minute    Notified Who amado borges     Read Back Yes     Notified Time      Hemodilution No     Device Comment sats 93. epap 6 min p 12 max p 28    ECG 12 Lead Dyspnea    Collection Time: 03/07/24  2:33 PM   Result Value Ref Range    QT Interval 402 ms    QTC Interval 506 ms         RADIOLOGY  XR Chest 1 View    Result Date: 3/7/2024  XR CHEST 1 VW-  HISTORY: Male who is 48 years-old, respiratory failure  TECHNIQUE: Frontal views of the chest  COMPARISON: None available  FINDINGS: The heart is enlarged. Pulmonary vasculature is congested. Opacities at the mid to lower lungs may reflect edema and/or pneumonia, follow-up recommended. No large pleural effusion. No pneumothorax is seen, apices are partly obscured by the chin. No acute osseous process.      As described.  This report was finalized on 3/7/2024 2:26 PM by Dr. Babak Mckeon M.D on Workstation: [id number]         MEDICATIONS GIVEN IN ER  Medications   sodium chloride 0.9 % flush 10 mL (has no administration in time range)   cefTRIAXone (ROCEPHIN) 2,000 mg in sodium chloride 0.9 % 100 mL IVPB-VTB (has no administration in time range)   furosemide (LASIX) injection 40 mg (has no administration in time range)   ipratropium-albuterol (DUO-NEB) nebulizer solution 3 mL (3 mL Nebulization Not Given 3/7/24 1452)   ipratropium-albuterol (DUO-NEB) nebulizer solution 3 mL (has no administration in time range)   methylPREDNISolone sodium succinate (SOLU-Medrol) injection 40 mg (has no administration in time range)   nitroglycerin (NITROSTAT) SL tablet 0.4 mg (has no administration in time range)   aluminum-magnesium hydroxide-simethicone (MAALOX MAX) 400-400-40 MG/5ML suspension 15 mL (has no administration in time range)   sennosides-docusate (PERICOLACE) 8.6-50 MG per tablet 2 tablet (has no administration in time range)     And   polyethylene glycol (MIRALAX) packet 17 g (has no administration in time range)     And   bisacodyl  (DULCOLAX) EC tablet 5 mg (has no administration in time range)     And   bisacodyl (DULCOLAX) suppository 10 mg (has no administration in time range)   acetaminophen (TYLENOL) tablet 650 mg (has no administration in time range)     Or   acetaminophen (TYLENOL) suppository 650 mg (has no administration in time range)   ondansetron ODT (ZOFRAN-ODT) disintegrating tablet 4 mg (has no administration in time range)     Or   ondansetron (ZOFRAN) injection 4 mg (has no administration in time range)   Enoxaparin Sodium (LOVENOX) syringe 40 mg (has no administration in time range)   Potassium Replacement - Follow Nurse / BPA Driven Protocol (has no administration in time range)   Magnesium Standard Dose Replacement - Follow Nurse / BPA Driven Protocol (has no administration in time range)   Phosphorus Replacement - Follow Nurse / BPA Driven Protocol (has no administration in time range)   Calcium Replacement - Follow Nurse / BPA Driven Protocol (has no administration in time range)   cefTRIAXone (ROCEPHIN) 2,000 mg in sodium chloride 0.9 % 100 mL IVPB-VTB (has no administration in time range)   ipratropium-albuterol (DUO-NEB) nebulizer solution 3 mL (3 mL Nebulization Given 3/7/24 1427)   furosemide (LASIX) injection 80 mg (80 mg Intravenous Given 3/7/24 1438)         ORDERS PLACED DURING THIS VISIT:  Orders Placed This Encounter   Procedures    Respiratory Panel PCR w/COVID-19(SARS-CoV-2) ABEL/DEBBY/FER/PAD/COR/MILDRED In-House, NP Swab in UTM/VTM, 2 HR TAT - Swab, Nasopharynx    Blood Culture - Blood,    Blood Culture - Blood,    MRSA Screen, PCR (Inpatient) - Swab, Nares    S. Pneumo Ag Urine or CSF - Urine, Urine, Clean Catch    Legionella Antigen, Urine - Urine, Urine, Clean Catch    Respiratory Culture - Sputum, Cough    XR Chest 1 View    Comprehensive Metabolic Panel    Protime-INR    aPTT    Blood Gas, Arterial -    BNP    D-dimer, Quantitative    High Sensitivity Troponin T    Lactic Acid, Plasma    Procalcitonin     Urine Drug Screen - Urine, Clean Catch    CBC Auto Differential    Blood Gas, Arterial -    Urinalysis With Microscopic If Indicated (No Culture) - Urine, Clean Catch    High Sensitivity Troponin T 2Hr    NPO Diet NPO Type: Strict NPO    Monitor Blood Pressure    Vital Signs Per hospital policy    Telemetry - Place Orders & Notify Provider of Results When Patient Experiences Acute Chest Pain, Dysrhythmia or Respiratory Distress    Continuous Pulse Oximetry    Height and weight    Daily Weights    Intake and Output    Oral Care - Patient Not on NPPV & Not Intubated    Target Arousal Level RASS 0 to -1    If Patient has BG of < 80 and is symptomatic but not on an IV insulin protocol then use the Adult Hypoglycemia Treatment Orders.    Saline Lock    Place Order to Consult Intensivist For Critical Care Management (If Patient Not Admitted to Cardiology for Primary Cardiology Condition)    Use Mobility Guidelines for Advancement of Activity    Code Status and Medical Interventions:    Pulmonology (on-call MD unless specified)    Inpatient Consult to Case Management     NIPPV (CPAP or BIPAP)    POC Glucose Q4H    ECG 12 Lead Dyspnea    Adult Transthoracic Echo Complete W/ Cont if Necessary Per Protocol    Insert Peripheral IV    Inpatient Admission    CBC & Differential         OUTPATIENT MEDICATION MANAGEMENT:  Current Facility-Administered Medications Ordered in Epic   Medication Dose Route Frequency Provider Last Rate Last Admin    acetaminophen (TYLENOL) tablet 650 mg  650 mg Oral Q4H PRN Emery Hauser MD        Or    acetaminophen (TYLENOL) suppository 650 mg  650 mg Rectal Q4H PRN Emery Hauser MD        aluminum-magnesium hydroxide-simethicone (MAALOX MAX) 400-400-40 MG/5ML suspension 15 mL  15 mL Oral Q6H PRN Emery Hauser MD        sennosides-docusate (PERICOLACE) 8.6-50 MG per tablet 2 tablet  2 tablet Oral BID Emery Hauser MD        And    polyethylene glycol  (MIRALAX) packet 17 g  17 g Oral Daily PRN Emery Hauser MD        And    bisacodyl (DULCOLAX) EC tablet 5 mg  5 mg Oral Daily PRN Emery Hauser MD        And    bisacodyl (DULCOLAX) suppository 10 mg  10 mg Rectal Daily PRN Emery Hauser MD        Calcium Replacement - Follow Nurse / BPA Driven Protocol   Does not apply PRN Emery Hauser MD        cefTRIAXone (ROCEPHIN) 2,000 mg in sodium chloride 0.9 % 100 mL IVPB-VTB  2,000 mg Intravenous Once Deny Richey MD        [START ON 3/8/2024] cefTRIAXone (ROCEPHIN) 2,000 mg in sodium chloride 0.9 % 100 mL IVPB-VTB  2,000 mg Intravenous Q24H Emery Hauser MD        Enoxaparin Sodium (LOVENOX) syringe 40 mg  40 mg Subcutaneous Daily Emery Hauser MD        furosemide (LASIX) injection 40 mg  40 mg Intravenous Q12H Emery Hauser MD        ipratropium-albuterol (DUO-NEB) nebulizer solution 3 mL  3 mL Nebulization Q4H - RT Emery Hauser MD        ipratropium-albuterol (DUO-NEB) nebulizer solution 3 mL  3 mL Nebulization Q2H PRN Emery Hauser MD        Magnesium Standard Dose Replacement - Follow Nurse / BPA Driven Protocol   Does not apply PRN Emery Hauser MD        methylPREDNISolone sodium succinate (SOLU-Medrol) injection 40 mg  40 mg Intravenous Q12H Emery Hauser MD        nitroglycerin (NITROSTAT) SL tablet 0.4 mg  0.4 mg Sublingual Q5 Min PRN Emery Hauser MD        ondansetron ODT (ZOFRAN-ODT) disintegrating tablet 4 mg  4 mg Oral Q6H PRN Emery Hauser MD        Or    ondansetron (ZOFRAN) injection 4 mg  4 mg Intravenous Q6H PRN Emery Hauser MD        Phosphorus Replacement - Follow Nurse / BPA Driven Protocol   Does not apply PRN Emery Hauser MD        Potassium Replacement - Follow Nurse / BPA Driven Protocol   Does not apply PRN Emery Hauser MD        sodium chloride 0.9 % flush 10 mL  10 mL Intravenous PRN Deny Richey MD         No current  [facility]-ordered outpatient medications on file.         PROCEDURES  Procedures      Total critical care time: Approximately 45 minutes    Due to a high probability of clinically significant, life threatening deterioration, the patient required my highest level of preparedness to intervene emergently and I personally spent this critical care time directly and personally managing the patient. This critical care time included obtaining a history; examining the patient; vital sign monitoring; ordering and review of studies; arranging urgent treatment with development of a management plan; evaluation of patient's response to treatment; frequent reassessment; and, discussions with other providers.  Indication: Acute hypoxemic respiratory failure  This critical care time was performed to assess and manage the high probability of imminent, life-threatening deterioration that could result in multi-organ failure. It was exclusive of separately billable procedures and treating other patients and teaching time.    Please see MDM section and the rest of the note for further information on patient assessment and treatment.        PROGRESS, DATA ANALYSIS, CONSULTS, AND MEDICAL DECISION MAKING  All labs have been independently interpreted by me.  All radiology studies have been reviewed by me. All EKG's have been independently viewed and interpreted by me.  Discussion below represents my analysis of pertinent findings related to patient's condition, differential diagnosis, treatment plan and final disposition.    Differential diagnosis:   My differential diagnosis for dyspnea includes but is not limited to:  Asthma, COPD, pneumonia, pulmonary embolus, acute respiratory distress syndrome, pneumothorax, pleural effusion, pulmonary fibrosis, congestive heart failure, myocardial infarction, DKA, uremia, acidosis, sepsis, anemia, drug related, hyperventilation, CNS disease      Clinical Scores:                  ED Course as of 03/07/24  1454   Thu Mar 07, 2024   1421 WBC: 8.63 [RS]   1421 Hemoglobin: 16.9 [RS]   1421 Platelets: 164 [RS]   1421 Immature Grans, Absolute: 0.05 [RS]   1422 ABG reviewed: pH 7.26, pCO2 79, pO2 61.8 [RS]   1422 RADIOLOGY      Study: Single view chest  Findings: Copious interstitial findings left greater than right suggesting heart failure plus or minus overlying pneumonia  I independently viewed and interpreted these images contemporaneously with treatment.    [RS]   1424 Late entry:   BiPAP initiated shortly after patient arrival [RS]   1425 Patient tolerating BiPAP well.  Work of breathing improved.  Mental status improved.  Plan admission to the ICU.  We will cover with ceftriaxone and check MRSA swab for left lower lobe pneumonia.  Furosemide rather than IV fluids ordered given the patient's general volume overloaded status. [RS]   1426 pH, Arterial(!!): 7.263 [RS]   1426 pO2, Arterial(!): 61.8 [RS]   1426 pCO2, Arterial(!!): 79.0 [RS]   1436 CONSULT        Provider: Dr. Hauser - ICU    Discussion: Reviewed patient history, ED presentation and evaluation as well as response to therapies and therapies initiated in the ED.  Agreeable to accept the patient to the ICU for further evaluation and treatment    Agreeable c treatment and planned disposition.         [RS]   1438 EKG           EKG time: 1433  Rhythm/Rate: Sinus, 95  P waves and WA: JACKIE within normal limits  QRS, axis: IVCD consistent with right bundle branch block  ST and T waves: ST/T wave repolarization abnormality without clear evidence of STEMI    Interpreted Contemporaneously by me, independently viewed  Comparison: Unavailable   [RS]   1449 Lactate: 1.2 [RS]   1449 D-Dimer, Quant(!): 0.66 [RS]   1449 INR: 1.10 [RS]   1454 BUN: 17 [RS]   1454 Creatinine: 0.89 [RS]   1454 Sodium: 137 [RS]   1454 Potassium: 4.3 [RS]   1454 ALT (SGPT): 15 [RS]   1454 AST (SGOT): 18 [RS]   1454 Total Bilirubin: 0.8 [RS]   1454 HS Troponin T(!): 38 [RS]   1454 proBNP(!):  1,526.0 [RS]      ED Course User Index  [RS] Deny Richey MD         Prescription drug monitoring program review:     AS OF 14:54 EST VITALS:    BP - 142/97  HR - 95  TEMP - (!) 101.1 °F (38.4 °C) (Tympanic)  O2 SATS - 94%    COMPLEXITY OF CARE  The patient requires admission.      DIAGNOSIS  Final diagnoses:   Acute hypoxemic respiratory failure   Fever in adult   Acute metabolic encephalopathy   Acute pulmonary edema   Pneumonia of left lower lobe due to infectious organism   Hypervolemia, unspecified hypervolemia type         DISPOSITION  ED Disposition       ED Disposition   Decision to Admit    Condition   --    Comment   Level of Care: Critical Care [6]   Diagnosis: Acute hypoxemic respiratory failure [[id number]]   Admitting Physician: ANGELA GONZALEZ [[id number]]   Certification: I Certify That Inpatient Hospital Services Are Medically Necessary For Greater Than 2 Midnights                    ADMISSION    Discussed treatment plan and reason for admission with pt/family and admitting physician.  Pt/family voiced understanding of the plan for admission for further testing/treatment as needed.       Please note that portions of this document were completed with a voice recognition program.    Note Disclaimer: At [facility], we believe that sharing information builds trust and better relationships. You are receiving this note because you recently visited [facility]. It is possible you will see health information before a provider has talked with you about it. This kind of information can be easy to misunderstand. To help you fully understand what it means for your health, we urge you to discuss this note with your provider.         Deny Richey MD  03/07/24 1452      Electronically signed by Deny Richey MD at 03/07/24 3748       Martha Mckinnon, RN at 03/07/24 1402          Patient from home via PV reporting increasing shortness of breath for 5 days. Patient's sister reports his oxygen saturation was  65% on room air. States he uses a Bipap, has a history of pulmonary edema. Patient placed on oxygen and taken to room immediately. Reports patient developed a cough with bloody sputum last night.     Electronically signed by Martha Mckinnon RN at 03/07/24 1405       Vital Signs (last day)       Date/Time Temp Temp src Pulse Resp BP Patient Position SpO2    03/08/24 0900 -- -- 84 -- 157/81 -- 90    03/08/24 0800 -- -- 82 -- 153/82 -- 93    03/08/24 0758 -- -- 83 28 153/82 -- 91    03/08/24 0752 99.6 (37.6) Axillary -- -- -- -- --    03/08/24 0700 -- -- 81 -- 145/84 -- 92    03/08/24 0630 -- -- 81 -- 140/79 -- 93    03/08/24 0615 -- -- 77 -- -- -- 93    03/08/24 0613 -- -- 78 -- 135/83 -- 92    03/08/24 0545 -- -- 80 -- -- -- 97    03/08/24 0530 -- -- 79 -- 145/84 -- 92    03/08/24 0515 -- -- 81 -- -- -- 93    03/08/24 0500 -- -- 77 -- 138/83 -- 93    03/08/24 0445 -- -- 78 -- -- -- 92    03/08/24 0430 -- -- 75 -- 147/80 -- 94    03/08/24 0415 98.9 (37.2) Axillary 79 -- -- -- 95    03/08/24 0400 -- -- 78 -- 138/78 -- 95    03/08/24 0345 -- -- 75 -- -- -- 93    03/08/24 0330 -- -- 78 -- 144/86 -- 94    03/08/24 0315 -- -- 73 28 -- -- 93    03/08/24 0306 -- -- 73 28 -- -- 93    03/08/24 0300 -- -- 77 -- 139/86 -- 92    03/08/24 0245 -- -- 76 -- -- -- 93    03/08/24 0230 -- -- 78 -- 134/81 -- 93    03/08/24 0215 -- -- 76 -- -- -- 93    03/08/24 0200 -- -- 76 -- 132/86 -- 93    03/08/24 0145 -- -- 74 -- -- -- 95    03/08/24 0130 -- -- 76 -- 126/77 -- 96    03/08/24 0115 -- -- 76 -- -- -- 94    03/08/24 0100 -- -- 77 -- 126/82 -- 94    03/08/24 0045 -- -- 79 -- -- -- 97    03/08/24 0030 -- -- 78 -- 129/84 -- 99    03/08/24 0015 98.6 (37) Axillary 78 -- -- -- 93    03/08/24 0000 -- -- 80 -- 127/77 -- 94    03/07/24 2345 -- -- 79 -- -- -- 94    03/07/24 2330 -- -- 80 -- 121/82 -- 94    03/07/24 2315 -- -- 82 -- -- -- 95    03/07/24 2300 -- -- 76 -- 131/76 -- 96    03/07/24 2245 -- -- 73 -- -- -- --    03/07/24 2241 -- --  75 28 -- -- 93    03/07/24 2235 -- -- 75 30 -- -- 94    03/07/24 2230 -- -- 79 -- 151/98 -- 94    03/07/24 2218 -- -- 75 30 -- -- 95    03/07/24 2215 -- -- 75 -- -- -- 93    03/07/24 2200 -- -- 74 -- 152/92 -- 95    03/07/24 2153 -- -- 74 30 -- -- 93    03/07/24 2130 -- -- 76 -- 147/90 -- 94    03/07/24 2115 -- -- 78 -- -- -- 93    03/07/24 2100 -- -- 77 -- 140/84 -- 94    03/07/24 2045 -- -- 77 -- -- -- 94    03/07/24 2030 -- -- 78 -- 138/86 -- 96    03/07/24 2015 -- -- 72 -- -- -- 97    03/07/24 2000 99.8 (37.7) Oral 79 -- 158/82 -- 95    03/07/24 1844 -- -- 80 25 -- -- 99    03/07/24 1831 -- -- 83 25 -- -- 98    03/07/24 1811 -- -- 86 -- 156/82 -- --    03/07/24 1726 100.5 (38.1) Oral -- -- -- -- --    03/07/24 1701 -- -- 86 25 156/82 -- 95    03/07/24 1638 -- -- 86 22 -- -- 96    03/07/24 1606 -- -- 88 -- 145/87 -- 90    03/07/24 1427 -- -- 96 26 136/90 -- 90    03/07/24 1424 -- -- 95 -- -- -- 94    03/07/24 1421 -- -- 91 -- 142/97 -- 97    03/07/24 1409 101.1 (38.4) Tympanic 93 -- 139/91 -- 93    03/07/24 1404 -- -- 97 -- -- -- 87    03/07/24 1402 -- -- -- 30 -- -- 49          Oxygen Therapy (last day)       Date/Time SpO2 Device (Oxygen Therapy) Flow (L/min) Oxygen Concentration (%) ETCO2 (mmHg)    03/08/24 0929 -- -- -- 60 --    03/08/24 0900 90 -- -- -- --    03/08/24 0800 93 NPPV/NIV -- 70 --    03/08/24 0758 91 NPPV/NIV -- 70 --    03/08/24 0700 92 -- -- -- --    03/08/24 0630 93 -- -- -- --    03/08/24 0615 93 -- -- -- --    03/08/24 0613 92 -- -- -- --    03/08/24 0545 97 -- -- -- --    03/08/24 0530 92 -- -- -- --    03/08/24 0515 93 -- -- -- --    03/08/24 0500 93 -- -- -- --    03/08/24 0445 92 -- -- -- --    03/08/24 0430 94 -- -- -- --    03/08/24 0415 95 -- -- -- --    03/08/24 0400 95 NPPV/NIV -- 70 --    03/08/24 0345 93 -- -- -- --    03/08/24 0330 94 -- -- -- --    03/08/24 0315 93 NPPV/NIV -- 70 --    03/08/24 0306 93 NPPV/NIV -- 70 --    03/08/24 0300 92 -- -- -- --    03/08/24 0245 93 -- --  -- --    03/08/24 0230 93 -- -- -- --    03/08/24 0215 93 -- -- -- --    03/08/24 0200 93 -- -- -- --    03/08/24 0145 95 -- -- -- --    03/08/24 0130 96 -- -- -- --    03/08/24 0115 94 -- -- -- --    03/08/24 0100 94 -- -- -- --    03/08/24 0045 97 -- -- -- --    03/08/24 0030 99 -- -- -- --    03/08/24 0015 93 -- -- -- --    03/08/24 0000 94 NPPV/NIV -- 70 --    03/07/24 2345 94 -- -- -- --    03/07/24 2330 94 -- -- -- --    03/07/24 2315 95 -- -- -- --    03/07/24 2300 96 -- -- -- --    03/07/24 2241 93 NPPV/NIV -- 70 --    03/07/24 2235 94 NPPV/NIV -- 70 --    03/07/24 2230 94 -- -- -- --    03/07/24 2218 95 NPPV/NIV -- 75 --    03/07/24 2215 93 -- -- -- --    03/07/24 2200 95 -- -- -- --    03/07/24 2153 93 NPPV/NIV -- 75 --    03/07/24 2130 94 -- -- -- --    03/07/24 2115 93 -- -- -- --    03/07/24 2100 94 -- -- -- --    03/07/24 2045 94 -- -- -- --    03/07/24 2030 96 -- -- -- --    03/07/24 2015 97 -- -- -- --    03/07/24 2000 95 NPPV/NIV -- 80 --    03/07/24 1844 99 NPPV/NIV -- 80 --    03/07/24 1831 98 NPPV/NIV -- 90 --    03/07/24 1701 95 NPPV/NIV -- 100 --    03/07/24 1638 96 -- -- -- --    03/07/24 1606 90 -- -- -- --    03/07/24 1427 90 NPPV/NIV -- 100 --    03/07/24 1424 94 NPPV/NIV -- 100 --    03/07/24 1421 97 -- -- -- --    03/07/24 1409 93 -- -- -- --    03/07/24 1404 87 nonrebreather mask 15 -- --    03/07/24 1402 49 -- -- -- --          Lines, Drains & Airways       Active LDAs       Name Placement date Placement time Site Days    Peripheral IV 03/07/24 1438 Anterior;Distal;Left;Upper Arm 03/07/24  1438  Arm  less than 1    Peripheral IV 03/07/24 1732 Anterior;Left Forearm 03/07/24  1732  Forearm  less than 1    Urethral Catheter Straight-tip 03/07/24  1710  -- less than 1                  Facility-Administered Medications as of 3/8/2024   Medication Dose Route Frequency Provider Last Rate Last Admin    acetaminophen (TYLENOL) tablet 650 mg  650 mg Oral Q4H PRN Emery Hauser MD        Or     acetaminophen (TYLENOL) suppository 650 mg  650 mg Rectal Q4H PRN Emery Hauser MD        [COMPLETED] acetaZOLAMIDE (DIAMOX) injection 500 mg  500 mg Intravenous Once Emery Hauser MD   500 mg at 03/07/24 1607    aluminum-magnesium hydroxide-simethicone (MAALOX MAX) 400-400-40 MG/5ML suspension 15 mL  15 mL Oral Q6H PRN Emery Hauser MD        sennosides-docusate (PERICOLACE) 8.6-50 MG per tablet 2 tablet  2 tablet Oral BID Emery Hauser MD        And    polyethylene glycol (MIRALAX) packet 17 g  17 g Oral Daily PRN Emery Hauser MD        And    bisacodyl (DULCOLAX) EC tablet 5 mg  5 mg Oral Daily PRN Emery Hauser MD        And    bisacodyl (DULCOLAX) suppository 10 mg  10 mg Rectal Daily PRN Emery Hauser MD        Calcium Replacement - Follow Nurse / BPA Driven Protocol   Does not apply PRN Emery Hauser MD        [COMPLETED] cefTRIAXone (ROCEPHIN) 2,000 mg in sodium chloride 0.9 % 100 mL IVPB-VTB  2,000 mg Intravenous Once Deny Richey  mL/hr at 03/07/24 1557 2,000 mg at 03/07/24 1557    cefTRIAXone (ROCEPHIN) 2,000 mg in sodium chloride 0.9 % 100 mL IVPB-VTB  2,000 mg Intravenous Q24H Emery Hauser MD        Enoxaparin Sodium (LOVENOX) syringe 40 mg  40 mg Subcutaneous Daily Emery Hauser MD   40 mg at 03/08/24 0802    fentaNYL citrate (PF) (SUBLIMAZE) injection 100 mcg  100 mcg Intravenous Once Aníbal Lima MD        furosemide (LASIX) injection 40 mg  40 mg Intravenous Q12H Emeyr Hauser MD   40 mg at 03/08/24 0802    [COMPLETED] furosemide (LASIX) injection 80 mg  80 mg Intravenous Once Deny Richey MD   80 mg at 03/07/24 1438    [COMPLETED] furosemide (LASIX) injection 80 mg  80 mg Intravenous Once Aníbal Lima MD   80 mg at 03/07/24 2002    [COMPLETED] ipratropium-albuterol (DUO-NEB) nebulizer solution 3 mL  3 mL Nebulization Once Deny Richey MD   3 mL at 03/07/24 1427    ipratropium-albuterol  (DUO-NEB) nebulizer solution 3 mL  3 mL Nebulization Q4H - RT Emery Hauser MD   3 mL at 03/08/24 0758    ipratropium-albuterol (DUO-NEB) nebulizer solution 3 mL  3 mL Nebulization Q2H PRN Emery Hauser MD        Magnesium Standard Dose Replacement - Follow Nurse / BPA Driven Protocol   Does not apply PRN Emery Hauser MD        methylPREDNISolone sodium succinate (SOLU-Medrol) injection 40 mg  40 mg Intravenous Q12H Emery Hauser MD   40 mg at 03/08/24 0329    nitroglycerin (NITROSTAT) SL tablet 0.4 mg  0.4 mg Sublingual Q5 Min PRN Emery Hauser MD        ondansetron ODT (ZOFRAN-ODT) disintegrating tablet 4 mg  4 mg Oral Q6H PRN Emery Hauser MD        Or    ondansetron (ZOFRAN) injection 4 mg  4 mg Intravenous Q6H PRN Emery Hauser MD        [COMPLETED] perflutren (DEFINITY) 8.476 mg in Sodium Chloride (PF) 0.9 % 10 mL injection  3 mL Intravenous Once in imaging Emery Hauser MD   3 mL at 03/07/24 1812    Phosphorus Replacement - Follow Nurse / BPA Driven Protocol   Does not apply PRN Emery Hauser MD        Potassium Replacement - Follow Nurse / BPA Driven Protocol   Does not apply PRN Emery Hauser MD        propofol (DIPRIVAN) infusion 10 mg/mL 100 mL  5-50 mcg/kg/min Intravenous Titrated Aníbal Lima MD        sodium chloride 0.9 % flush 10 mL  10 mL Intravenous PRN Deny Richey MD         Orders (last 24 hrs)        Start     Ordered    03/08/24 1500  cefTRIAXone (ROCEPHIN) 2,000 mg in sodium chloride 0.9 % 100 mL IVPB-VTB  Every 24 Hours         03/07/24 1452    03/08/24 0934  Blood Gas, Arterial -  PROCEDURE ONCE         03/08/24 0929    03/08/24 0930  Blood Gas, Arterial -Obtain on: Current Settings  Timed         03/08/24 0855    03/08/24 0750  POC Glucose Once  PROCEDURE ONCE        Comments: Complete no more than 45 minutes prior to patient eating      03/08/24 0747    03/08/24 0400  Blood Gas, Arterial -  Once          "03/07/24 2228    03/08/24 0400  Basic Metabolic Panel  Once         03/08/24 0315    03/08/24 0400  CBC (No Diff)  Once         03/08/24 0315    03/08/24 0345  Blood Gas, Arterial -  PROCEDURE ONCE         03/08/24 0341    03/08/24 0329  POC Glucose Once  PROCEDURE ONCE        Comments: Complete no more than 45 minutes prior to patient eating      03/08/24 0327    03/08/24 0036  POC Glucose Once  PROCEDURE ONCE        Comments: Complete no more than 45 minutes prior to patient eating      03/08/24 0034    03/08/24 0000  cefTRIAXone (ROCEPHIN) 1,000 mg in sodium chloride 0.9 % 100 mL IVPB-VTB  Every 24 Hours,   Status:  Discontinued         03/07/24 1447 03/07/24 2200  Blood Gas, Arterial -  Once         03/07/24 2003 03/07/24 2150  Blood Gas, Arterial -  PROCEDURE ONCE         03/07/24 2143    03/07/24 2100  sennosides-docusate (PERICOLACE) 8.6-50 MG per tablet 2 tablet  2 Times Daily        Placed in \"And\" Linked Group    03/07/24 1449    03/07/24 2002  POC Glucose Once  PROCEDURE ONCE        Comments: Complete no more than 45 minutes prior to patient eating      03/07/24 2000 03/07/24 2000  furosemide (LASIX) injection 40 mg  Every 12 Hours         03/07/24 1447    03/07/24 2000  furosemide (LASIX) injection 80 mg  Once         03/07/24 1845    03/07/24 1900  propofol (DIPRIVAN) infusion 10 mg/mL 100 mL  Titrated         03/07/24 1803    03/07/24 1900  fentaNYL 1000 mcg in 100 mL NS infusion  Titrated,   Status:  Discontinued         03/07/24 1803    03/07/24 1900  fentaNYL citrate (PF) (SUBLIMAZE) injection 100 mcg  Once         03/07/24 1804    03/07/24 1900  perflutren (DEFINITY) 8.476 mg in Sodium Chloride (PF) 0.9 % 10 mL injection  Once in Imaging         03/07/24 1812    03/07/24 1802  Blood Gas, Arterial -  PROCEDURE ONCE         03/07/24 1755    03/07/24 1730  Blood Gas, Arterial -  Timed         03/07/24 1700    03/07/24 1721  POC Glucose Once  PROCEDURE ONCE        Comments: Complete no more " "than 45 minutes prior to patient eating      03/07/24 1719    03/07/24 1700  Blood Gas, Arterial -  Once         03/07/24 1447    03/07/24 1700  Insert Indwelling Urinary Catheter  Once        Comments: Follow Protocol As Outlined in Process Instructions (Open Order Report to View Full Instructions)   Placed in \"And\" Linked Group    03/07/24 1700    03/07/24 1700  Assess Need for Indwelling Urinary Catheter - Follow Removal Protocol  Continuous        Comments: Follow Protocol As Outlined in Process Instructions (Open Order Report to View Full Instructions)   Placed in \"And\" Linked Group    03/07/24 1700    03/07/24 1700  Urinary Catheter Care  Every Shift      Placed in \"And\" Linked Group    03/07/24 1700    03/07/24 1652  Blood Gas, Arterial -  PROCEDURE ONCE         03/07/24 1645    03/07/24 1612  Urinalysis, Microscopic Only - Urine, Clean Catch  Once         03/07/24 1611    03/07/24 1610  High Sensitivity Troponin T 2Hr  PROCEDURE ONCE         03/07/24 1450    03/07/24 1600  POC Glucose Q4H  Every 4 Hours       03/07/24 1449    03/07/24 1530  ipratropium-albuterol (DUO-NEB) nebulizer solution 3 mL  Every 4 Hours - RT         03/07/24 1448    03/07/24 1525  acetaZOLAMIDE (DIAMOX) injection 500 mg  Once         03/07/24 1509    03/07/24 1505  Enoxaparin Sodium (LOVENOX) syringe 40 mg  Daily         03/07/24 1449    03/07/24 1504  methylPREDNISolone sodium succinate (SOLU-Medrol) injection 40 mg  Every 12 Hours         03/07/24 1448    03/07/24 1450  Daily Weights  Daily       03/07/24 1449    03/07/24 1450  Adult Transthoracic Echo Complete W/ Cont if Necessary Per Protocol  Once         03/07/24 1449    03/07/24 1449  Potassium Replacement - Follow Nurse / BPA Driven Protocol  As Needed         03/07/24 1449    03/07/24 1449  Magnesium Standard Dose Replacement - Follow Nurse / BPA Driven Protocol  As Needed         03/07/24 1449    03/07/24 1449  Phosphorus Replacement - Follow Nurse / BPA Driven Protocol  As " Needed         03/07/24 1449    03/07/24 1449  Calcium Replacement - Follow Nurse / BPA Driven Protocol  As Needed         03/07/24 1449    03/07/24 1449  Code Status and Medical Interventions:  Continuous         03/07/24 1449    03/07/24 1449  Vital Signs Per hospital policy  Per Hospital Policy         03/07/24 1449    03/07/24 1449  Continuous Cardiac Monitoring  Continuous        Comments: Follow Standing Orders As Outlined in Process Instructions (Open Order Report to View Full Instructions)    03/07/24 1449    03/07/24 1449  Telemetry - Maintain IV Access  Continuous,   Status:  Canceled         03/07/24 1449    03/07/24 1449  Telemetry - Place Orders & Notify Provider of Results When Patient Experiences Acute Chest Pain, Dysrhythmia or Respiratory Distress  Until Discontinued         03/07/24 1449    03/07/24 1449  Continuous Pulse Oximetry  Continuous         03/07/24 1449    03/07/24 1449  Height and weight  Once         03/07/24 1449    03/07/24 1449  Intake and Output  Every Shift       03/07/24 1449    03/07/24 1449  Oral Care - Patient Not on NPPV & Not Intubated  Every Shift       03/07/24 1449    03/07/24 1449  Target Arousal Level RASS 0 to -1  Continuous         03/07/24 1449    03/07/24 1449  If Patient has BG of < 80 and is symptomatic but not on an IV insulin protocol then use the Adult Hypoglycemia Treatment Orders.  Once         03/07/24 1449    03/07/24 1449  Saline Lock  Continuous        Comments: For standing ICU/CCU standing orders    03/07/24 1449    03/07/24 1449  Place Order to Consult Intensivist For Critical Care Management (If Patient Not Admitted to Cardiology for Primary Cardiology Condition)  Continuous        Comments: For standing ICU/CCU standing orders    03/07/24 1449    03/07/24 1449  Use Mobility Guidelines for Advancement of Activity  Until Discontinued         03/07/24 1449    03/07/24 1449  Inpatient Consult to Case Management   Once        Provider:   "(Not yet assigned)    03/07/24 1449    03/07/24 1449  NPO Diet NPO Type: Strict NPO  Diet Effective Now         03/07/24 1449    03/07/24 1448  nitroglycerin (NITROSTAT) SL tablet 0.4 mg  Every 5 Minutes PRN         03/07/24 1449    03/07/24 1448  aluminum-magnesium hydroxide-simethicone (MAALOX MAX) 400-400-40 MG/5ML suspension 15 mL  Every 6 Hours PRN         03/07/24 1449    03/07/24 1448  polyethylene glycol (MIRALAX) packet 17 g  Daily PRN        Placed in \"And\" Linked Group    03/07/24 1449    03/07/24 1448  bisacodyl (DULCOLAX) EC tablet 5 mg  Daily PRN        Placed in \"And\" Linked Group    03/07/24 1449    03/07/24 1448  bisacodyl (DULCOLAX) suppository 10 mg  Daily PRN        Placed in \"And\" Linked Group    03/07/24 1449    03/07/24 1448  acetaminophen (TYLENOL) tablet 650 mg  Every 4 Hours PRN        Placed in \"Or\" Linked Group    03/07/24 1449    03/07/24 1448  acetaminophen (TYLENOL) suppository 650 mg  Every 4 Hours PRN        Placed in \"Or\" Linked Group    03/07/24 1449    03/07/24 1448  ondansetron ODT (ZOFRAN-ODT) disintegrating tablet 4 mg  Every 6 Hours PRN        Placed in \"Or\" Linked Group    03/07/24 1449    03/07/24 1448  ondansetron (ZOFRAN) injection 4 mg  Every 6 Hours PRN        Placed in \"Or\" Linked Group    03/07/24 1449    03/07/24 1448  ipratropium-albuterol (DUO-NEB) nebulizer solution 3 mL  Every 2 Hours PRN         03/07/24 1448    03/07/24 1448  Urinalysis With Microscopic If Indicated (No Culture) - Urine, Clean Catch  Once         03/07/24 1447    03/07/24 1446  S. Pneumo Ag Urine or CSF - Urine, Urine, Clean Catch  Once         03/07/24 1447    03/07/24 1446  Legionella Antigen, Urine - Urine, Urine, Clean Catch  Once         03/07/24 1447    03/07/24 1446  Respiratory Culture - Sputum, Cough  Once         03/07/24 1447    03/07/24 1439  cefTRIAXone (ROCEPHIN) 2,000 mg in sodium chloride 0.9 % 100 mL IVPB-VTB  Once         03/07/24 1423    03/07/24 1439  Inpatient Admission  " "Once         03/07/24 1439    03/07/24 1438  furosemide (LASIX) injection 80 mg  Once         03/07/24 1422    03/07/24 1427  Pulmonology (on-call MD unless specified)  Once        Specialty:  Pulmonary Disease  Provider:  (Not yet assigned)    03/07/24 1426    03/07/24 1424  ipratropium-albuterol (DUO-NEB) nebulizer solution 3 mL  Once         03/07/24 1408    03/07/24 1424  MRSA Screen, PCR (Inpatient) - Swab, Nares  Once         03/07/24 1423    03/07/24 1409  XR Chest 1 View  1 Time Imaging         03/07/24 1408    03/07/24 1409  Insert Peripheral IV  Once        Placed in \"And\" Linked Group    03/07/24 1408    03/07/24 1409  Monitor Blood Pressure  Per Hospital Policy         03/07/24 1408    03/07/24 1409  Cardiac Monitoring  Continuous,   Status:  Canceled        Comments: Follow Standing Orders As Outlined in Process Instructions (Open Order Report to View Full Instructions)    03/07/24 1408    03/07/24 1409  Pulse Oximetry, Continuous  Continuous,   Status:  Canceled         03/07/24 1408    03/07/24 1409  Respiratory Panel PCR w/COVID-19(SARS-CoV-2) [facility] In-House, NP Swab in UTM/VTM, 2 HR TAT - Swab, Nasopharynx  STAT         03/07/24 1408    03/07/24 1409  CBC & Differential  Once         03/07/24 1408    03/07/24 1409  Comprehensive Metabolic Panel  Once         03/07/24 1408    03/07/24 1409  Protime-INR  Once         03/07/24 1408    03/07/24 1409  aPTT  Once         03/07/24 1408    03/07/24 1409  Blood Gas, Arterial -  Once         03/07/24 1408    03/07/24 1409  BNP  Once         03/07/24 1408    03/07/24 1409  D-dimer, Quantitative  Once         03/07/24 1408    03/07/24 1409  High Sensitivity Troponin T  Once         03/07/24 1408    03/07/24 1409  Blood Culture - Blood, Arm, Left  Once         03/07/24 1408    03/07/24 1409  Blood Culture - Blood, Arm, Left  Once         03/07/24 1408    03/07/24 1409  Lactic Acid, Plasma  Once         03/07/24 1408    03/07/24 1409  " "Procalcitonin  Once         03/07/24 1408    03/07/24 1409  Urine Drug Screen - Urine, Clean Catch  Once         03/07/24 1408    03/07/24 1409  CBC Auto Differential  PROCEDURE ONCE         03/07/24 1408    03/07/24 1409  NIPPV (CPAP or BIPAP)  Until Discontinued         03/07/24 1408    03/07/24 1408  sodium chloride 0.9 % flush 10 mL  As Needed        Placed in \"And\" Linked Group    03/07/24 1408    03/07/24 1407  ECG 12 Lead Dyspnea  Once         03/07/24 1406                 "

## 2024-03-08 NOTE — CASE MANAGEMENT/SOCIAL WORK
Discharge Planning Assessment  [facility]     Patient Name: Josh Hinojosa  MRN: [id number]  Today's Date: 3/8/2024    Admit Date: 3/7/2024    Plan: Home   Discharge Needs Assessment       Row Name 03/08/24 1635       Living Environment    People in Home alone    Current Living Arrangements home    Potentially Unsafe Housing Conditions none    Primary Care Provided by self    Provides Primary Care For no one    Family Caregiver if Needed sibling(s)    Quality of Family Relationships helpful;involved;supportive    Able to Return to Prior Arrangements yes       Resource/Environmental Concerns    Resource/Environmental Concerns financial    Transportation Concerns none       Transition Planning    Patient/Family Anticipates Transition to home    Patient/Family Anticipated Services at Transition none    Transportation Anticipated family or friend will provide       Discharge Needs Assessment    Readmission Within the Last 30 Days no previous admission in last 30 days    Equipment Currently Used at Home bipap    Anticipated Changes Related to Illness none    Equipment Needed After Discharge none    Provided Post Acute Provider List? N/A    Provided Post Acute Provider Quality & Resource List? N/A                   Discharge Plan       Row Name 03/08/24 1637       Plan    Plan --      Row Name 03/08/24 1635       Plan    Plan Home    Plan Comments CCP met with patient and sister at bedside. Introduced self and explained role of CCP. Patient was wearing Bipap and sister confirmed the information on the face sheet is accurate. Patient does not have a PCP. Provided patients sister with a Provider Directory Assistance form so patient can set up an appointment through [facility]. Patient lives at home alone. Patient has a Bipap and an O2 concentrator at home. Sister asked CCP about SSI and CCP provided information on the process of setting it up. Patient plans home at discharge. Family can transport.                  Continued  Care and Services - Admitted Since 3/7/2024    No active coordination exists for this encounter.          Demographic Summary       Row Name 03/08/24 1635       General Information    Admission Type inpatient    Arrived From emergency department    Referral Source admission list    Reason for Consult discharge planning    Preferred Language English                   Functional Status       Row Name 03/08/24 1635       Functional Status    Usual Activity Tolerance moderate    Current Activity Tolerance moderate       Functional Status, IADL    Medications independent    Meal Preparation independent    Housekeeping independent    Laundry independent    Shopping independent       Mental Status    General Appearance WDL WDL       Mental Status Summary    Recent Changes in Mental Status/Cognitive Functioning no changes       Employment/    Employment Status unemployed                   Psychosocial    No documentation.                  Abuse/Neglect    No documentation.                  Legal    No documentation.                  Substance Abuse    No documentation.                  Patient Forms    No documentation.

## 2024-03-08 NOTE — PROGRESS NOTES
"    [facility] INPATIENT PROGRESS NOTE         [facility] CARDIAC INTENSIVE CARE    3/8/2024      PATIENT IDENTIFICATION:  Name: Josh Hinojosa ADMIT: 3/7/2024   : 1975  PCP: Provider, No Known    MRN: [id number] LOS: 1 days   AGE/SEX: 48 y.o. male  ROOM: [facility]                     LOS 1    Reason for visit: Respiratory failure with CHF exacerbation      SUBJECTIVE:      On NIPPV at 70% FiO2 and saturations 93% at time of visit.  Not on any drips.  Repeat ABG in the morning earlier showed some improvement but still with hypercapnic failure and respiratory acidosis.  Reduced his oxygen to 50% and saturations 92%.  Plan to repeat ABG.  Opens eyes to voice but nods his head.  Too tired and dyspneic to give any other response.    Objective   OBJECTIVE:    Vital Sign Min/Max for last 24 hours  Temp  Min: 98.6 °F (37 °C)  Max: 101.1 °F (38.4 °C)   BP  Min: 121/82  Max: 158/82   Pulse  Min: 72  Max: 97   Resp  Min: 22  Max: 30   SpO2  Min: 49 %  Max: 99 %   No data recorded   Weight  Min: 162 kg (358 lb 0.4 oz)  Max: 164 kg (361 lb 1.8 oz)    Vitals:    24 0758 24 0800 24 0900 24 0929   BP: 153/82 153/82 157/81    Pulse: 83 82 84    Resp: 28      Temp:       TempSrc:       SpO2: 91% 93% 90%    Weight:       Height:    180.3 cm (71\")            24  1638 24  1811 24  0401   Weight: (!) 164 kg (361 lb 1.8 oz) (!) 164 kg (361 lb 1.8 oz) (!) 162 kg (358 lb 0.4 oz)       Body mass index is 49.93 kg/m².                          Body mass index is 49.93 kg/m².    Intake/Output Summary (Last 24 hours) at 3/8/2024 1043  Last data filed at 3/8/2024 0401  Gross per 24 hour   Intake 0 ml   Output 2400 ml   Net -2400 ml         Exam:  GEN:  No distress, appears stated age.  Morbidly obese  EYES:   PERRL, anicteric sclerae  ENT:    External ears/nose normal, OP clear  NECK:  No adenopathy, midline trachea  LUNGS: Mildly labored, normal chest on inspection, palpation and diminished " on auscultation  CV:  Normal S1S2, without murmur  ABD:  Nontender, nondistended, no hepatosplenomegaly, +BS  EXT:  + edema.  No cyanosis or clubbing.  No mottling and normal cap refill.    Assessment     Scheduled meds:  cefTRIAXone, 2,000 mg, Intravenous, Q24H  enoxaparin, 40 mg, Subcutaneous, Q12H  fentaNYL citrate (PF), 100 mcg, Intravenous, Once  furosemide, 40 mg, Intravenous, Q12H  ipratropium-albuterol, 3 mL, Nebulization, Q4H - RT  methylPREDNISolone sodium succinate, 40 mg, Intravenous, Q12H  senna-docusate sodium, 2 tablet, Oral, BID      IV meds:                      propofol, 5-50 mcg/kg/min      Data Review:  Results from last 7 days   Lab Units 03/08/24  0333 03/07/24  1410   SODIUM mmol/L 137 137   POTASSIUM mmol/L 4.3 4.3   CHLORIDE mmol/L 95* 95*   CO2 mmol/L 36.0* 31.4*   BUN mg/dL 20 17   CREATININE mg/dL 0.98 0.89   GLUCOSE mg/dL 133* 124*   CALCIUM mg/dL 8.7 8.4*         Estimated Creatinine Clearance: 145 mL/min (by C-G formula based on SCr of 0.98 mg/dL).  Results from last 7 days   Lab Units 03/08/24  0333 03/07/24  1410   WBC 10*3/mm3 8.46 8.63   HEMOGLOBIN g/dL 17.7 16.9   PLATELETS 10*3/mm3 183 164     Results from last 7 days   Lab Units 03/07/24  1410   INR  1.10     Results from last 7 days   Lab Units 03/07/24  1410   ALT (SGPT) U/L 15   AST (SGOT) U/L 18     Results from last 7 days   Lab Units 03/08/24  0929 03/08/24  0341 03/07/24  2143 03/07/24  1755 03/07/24  1731 03/07/24  1645 03/07/24  1420   PH, ARTERIAL pH units 7.333* 7.306* 7.290* 7.297* 7.249* 7.211* 7.263*   PO2 ART mm Hg 60.6* 72.1* 70.8* 73.5* 70.2* 71.1* 61.8*   PCO2, ARTERIAL mm Hg 68.2* 75.2* 76.7* 73.2* 89.8* 99.1* 79.0*   HCO3 ART mmol/L 36.2* 37.5* 36.8* 35.8* 39.3* 39.7* 35.7*     Results from last 7 days   Lab Units 03/07/24  1410   PROCALCITONIN ng/mL 0.21   LACTATE mmol/L 1.2         Glucose   Date/Time Value Ref Range Status   03/08/2024 0747 116 70 - 130 mg/dL Final   03/08/2024 0327 134 (H) 70 - 130 mg/dL  Final   03/08/2024 0034 128 70 - 130 mg/dL Final   03/07/2024 2000 128 70 - 130 mg/dL Final   03/07/2024 1719 120 70 - 130 mg/dL Final         Imaging reviewed  Chest x-ray 3/7 reviewed shows pulmonary edema and left lower infiltrate      2D echo reviewed: EF 78% right ventricular cavity mildly dilated with left atrial cavity moderately dilated.    Microbiology reviewed            Active Hospital Problems    Diagnosis  POA    **Acute hypoxemic respiratory failure [J96.01]  Yes      Resolved Hospital Problems   No resolved problems to display.         ASSESSMENT:  Acute hypoxemic and hypercapnic respiratory failure with respiratory acidosis  Pulmonary vascular congestion with acute on chronic CHF with preserved ejection fraction  Volume overload  Metabolic encephalopathy  Left lower lobe infiltrate/pneumonia versus asymmetric edema  Persistent tobacco and marijuana use  Fever 101      PLAN:  Repeat ABG shows further improving hypercapnic failure.  Continue NIPPV as tolerates.  Weaning FiO2 as able.  Diuresing with Lasix.  Weaning steroids.  Bronchodilators for obstructive lung disease symptoms.  Some of previous wheezing may have been cardiac wheeze with pulmonary edema.  Continue Rocephin for left lower lobe infiltrate.  Fever curve improved.  Correct electrolytes.  Control glucose.  Control blood pressure.  DVT prophylaxis.    Discussed with multidisciplinary ICU team on rounds this morning.       CCT: 35 min    Emery Hauser MD  Pulmonary and Critical Care Medicine  [facility], [facility]  3/8/2024    10:43 EST

## 2024-03-09 ENCOUNTER — APPOINTMENT (OUTPATIENT)
Dept: GENERAL RADIOLOGY | Facility: HOSPITAL | Age: 49
End: 2024-03-09
Payer: COMMERCIAL

## 2024-03-09 LAB
ANION GAP SERPL CALCULATED.3IONS-SCNC: 13.1 MMOL/L (ref 5–15)
ARTERIAL PATENCY WRIST A: POSITIVE
ATMOSPHERIC PRESS: 742 MMHG
BASE EXCESS BLDA CALC-SCNC: 7.5 MMOL/L (ref 0–2)
BDY SITE: ABNORMAL
BUN SERPL-MCNC: 25 MG/DL (ref 6–20)
BUN/CREAT SERPL: 24.8 (ref 7–25)
CALCIUM SPEC-SCNC: 8.6 MG/DL (ref 8.6–10.5)
CHLORIDE SERPL-SCNC: 92 MMOL/L (ref 98–107)
CO2 BLDA-SCNC: >34.54 MMOL/L (ref 23–27)
CO2 SERPL-SCNC: 30.9 MMOL/L (ref 22–29)
CREAT SERPL-MCNC: 1.01 MG/DL (ref 0.76–1.27)
DEPRECATED RDW RBC AUTO: 47.1 FL (ref 37–54)
DEVICE COMMENT: ABNORMAL
EGFRCR SERPLBLD CKD-EPI 2021: 92.3 ML/MIN/1.73
ERYTHROCYTE [DISTWIDTH] IN BLOOD BY AUTOMATED COUNT: 13.6 % (ref 12.3–15.4)
GLUCOSE BLDC GLUCOMTR-MCNC: 113 MG/DL (ref 70–130)
GLUCOSE BLDC GLUCOMTR-MCNC: 139 MG/DL (ref 70–130)
GLUCOSE BLDC GLUCOMTR-MCNC: 144 MG/DL (ref 70–130)
GLUCOSE BLDC GLUCOMTR-MCNC: 145 MG/DL (ref 70–130)
GLUCOSE BLDC GLUCOMTR-MCNC: 161 MG/DL (ref 70–130)
GLUCOSE SERPL-MCNC: 120 MG/DL (ref 65–99)
HCO3 BLDA-SCNC: 34.3 MMOL/L (ref 22–28)
HCT VFR BLD AUTO: 52.9 % (ref 37.5–51)
HEMODILUTION: NO
HGB BLD-MCNC: 17.2 G/DL (ref 13–17.7)
INHALED O2 CONCENTRATION: 70 %
INSPIRATORY TIME: 1
MCH RBC QN AUTO: 30.6 PG (ref 26.6–33)
MCHC RBC AUTO-ENTMCNC: 32.5 G/DL (ref 31.5–35.7)
MCV RBC AUTO: 94 FL (ref 79–97)
MODALITY: ABNORMAL
O2 A-A PPRESDIFF RESPIRATORY: 0.1 MMHG
PAW @ PEAK INSP FLOW SETTING VENT: 32 CMH2O
PCO2 BLDA: 52.5 MM HG (ref 35–45)
PH BLDA: 7.42 PH UNITS (ref 7.35–7.45)
PLATELET # BLD AUTO: 159 10*3/MM3 (ref 140–450)
PMV BLD AUTO: 10 FL (ref 6–12)
PO2 BLDA: 58.6 MM HG (ref 80–100)
POTASSIUM SERPL-SCNC: 4.3 MMOL/L (ref 3.5–5.2)
RBC # BLD AUTO: 5.63 10*6/MM3 (ref 4.14–5.8)
SAO2 % BLDCOA: 90 % (ref 92–98.5)
SET MECH RESP RATE: 28
SODIUM SERPL-SCNC: 136 MMOL/L (ref 136–145)
TOTAL RATE: 32 BREATHS/MINUTE
VT ON VENT VENT: 884 ML
WBC NRBC COR # BLD AUTO: 6.41 10*3/MM3 (ref 3.4–10.8)

## 2024-03-09 PROCEDURE — 94664 DEMO&/EVAL PT USE INHALER: CPT

## 2024-03-09 PROCEDURE — 25010000002 FUROSEMIDE PER 20 MG: Performed by: INTERNAL MEDICINE

## 2024-03-09 PROCEDURE — 80048 BASIC METABOLIC PNL TOTAL CA: CPT

## 2024-03-09 PROCEDURE — 25010000002 METHYLPREDNISOLONE PER 40 MG: Performed by: INTERNAL MEDICINE

## 2024-03-09 PROCEDURE — 94799 UNLISTED PULMONARY SVC/PX: CPT

## 2024-03-09 PROCEDURE — 36600 WITHDRAWAL OF ARTERIAL BLOOD: CPT

## 2024-03-09 PROCEDURE — 25010000002 AZITHROMYCIN PER 500 MG: Performed by: INTERNAL MEDICINE

## 2024-03-09 PROCEDURE — 25810000003 SODIUM CHLORIDE 0.9 % SOLUTION 250 ML FLEX CONT: Performed by: INTERNAL MEDICINE

## 2024-03-09 PROCEDURE — 85027 COMPLETE CBC AUTOMATED: CPT

## 2024-03-09 PROCEDURE — 82948 REAGENT STRIP/BLOOD GLUCOSE: CPT

## 2024-03-09 PROCEDURE — 25010000002 LABETALOL 5 MG/ML SOLUTION: Performed by: INTERNAL MEDICINE

## 2024-03-09 PROCEDURE — 94761 N-INVAS EAR/PLS OXIMETRY MLT: CPT

## 2024-03-09 PROCEDURE — 97162 PT EVAL MOD COMPLEX 30 MIN: CPT

## 2024-03-09 PROCEDURE — 71045 X-RAY EXAM CHEST 1 VIEW: CPT

## 2024-03-09 PROCEDURE — 82803 BLOOD GASES ANY COMBINATION: CPT

## 2024-03-09 PROCEDURE — 97110 THERAPEUTIC EXERCISES: CPT

## 2024-03-09 PROCEDURE — 25010000002 CEFTRIAXONE PER 250 MG: Performed by: INTERNAL MEDICINE

## 2024-03-09 PROCEDURE — 25010000002 ENOXAPARIN PER 10 MG: Performed by: INTERNAL MEDICINE

## 2024-03-09 PROCEDURE — 94660 CPAP INITIATION&MGMT: CPT

## 2024-03-09 RX ORDER — LABETALOL HYDROCHLORIDE 5 MG/ML
10 INJECTION, SOLUTION INTRAVENOUS EVERY 4 HOURS PRN
Status: DISCONTINUED | OUTPATIENT
Start: 2024-03-09 | End: 2024-03-13 | Stop reason: HOSPADM

## 2024-03-09 RX ORDER — FUROSEMIDE 10 MG/ML
40 INJECTION INTRAMUSCULAR; INTRAVENOUS
Status: DISCONTINUED | OUTPATIENT
Start: 2024-03-09 | End: 2024-03-13 | Stop reason: HOSPADM

## 2024-03-09 RX ADMIN — ACETAMINOPHEN 325MG 650 MG: 325 TABLET ORAL at 06:31

## 2024-03-09 RX ADMIN — IPRATROPIUM BROMIDE AND ALBUTEROL SULFATE 3 ML: 2.5; .5 SOLUTION RESPIRATORY (INHALATION) at 15:00

## 2024-03-09 RX ADMIN — METHYLPREDNISOLONE SODIUM SUCCINATE 40 MG: 40 INJECTION, POWDER, FOR SOLUTION INTRAMUSCULAR; INTRAVENOUS at 15:45

## 2024-03-09 RX ADMIN — DOCUSATE SODIUM 50MG AND SENNOSIDES 8.6MG 2 TABLET: 8.6; 5 TABLET, FILM COATED ORAL at 20:18

## 2024-03-09 RX ADMIN — AZITHROMYCIN MONOHYDRATE 500 MG: 500 INJECTION, POWDER, LYOPHILIZED, FOR SOLUTION INTRAVENOUS at 16:48

## 2024-03-09 RX ADMIN — IPRATROPIUM BROMIDE AND ALBUTEROL SULFATE 3 ML: 2.5; .5 SOLUTION RESPIRATORY (INHALATION) at 07:28

## 2024-03-09 RX ADMIN — DOCUSATE SODIUM 50MG AND SENNOSIDES 8.6MG 2 TABLET: 8.6; 5 TABLET, FILM COATED ORAL at 09:11

## 2024-03-09 RX ADMIN — IPRATROPIUM BROMIDE AND ALBUTEROL SULFATE 3 ML: 2.5; .5 SOLUTION RESPIRATORY (INHALATION) at 03:36

## 2024-03-09 RX ADMIN — METHYLPREDNISOLONE SODIUM SUCCINATE 40 MG: 40 INJECTION, POWDER, FOR SOLUTION INTRAMUSCULAR; INTRAVENOUS at 03:18

## 2024-03-09 RX ADMIN — FUROSEMIDE 40 MG: 10 INJECTION, SOLUTION INTRAMUSCULAR; INTRAVENOUS at 17:15

## 2024-03-09 RX ADMIN — FUROSEMIDE 40 MG: 10 INJECTION, SOLUTION INTRAMUSCULAR; INTRAVENOUS at 09:11

## 2024-03-09 RX ADMIN — IPRATROPIUM BROMIDE AND ALBUTEROL SULFATE 3 ML: 2.5; .5 SOLUTION RESPIRATORY (INHALATION) at 11:34

## 2024-03-09 RX ADMIN — ENOXAPARIN SODIUM 40 MG: 100 INJECTION SUBCUTANEOUS at 09:11

## 2024-03-09 RX ADMIN — CEFTRIAXONE 2000 MG: 2 INJECTION, POWDER, FOR SOLUTION INTRAMUSCULAR; INTRAVENOUS at 15:45

## 2024-03-09 RX ADMIN — ENOXAPARIN SODIUM 40 MG: 100 INJECTION SUBCUTANEOUS at 20:18

## 2024-03-09 RX ADMIN — LABETALOL HYDROCHLORIDE 10 MG: 5 INJECTION, SOLUTION INTRAVENOUS at 17:15

## 2024-03-09 RX ADMIN — IPRATROPIUM BROMIDE AND ALBUTEROL SULFATE 3 ML: 2.5; .5 SOLUTION RESPIRATORY (INHALATION) at 19:16

## 2024-03-09 RX ADMIN — IPRATROPIUM BROMIDE AND ALBUTEROL SULFATE 3 ML: 2.5; .5 SOLUTION RESPIRATORY (INHALATION) at 23:51

## 2024-03-09 NOTE — PLAN OF CARE
Goal Outcome Evaluation:      Patient is a 48 y.o. male admitted to [facility] for increased SOA on 3/7/2024 with workup revealing Acute hypoxemic and hypercapnic respiratory failure with respiratory acidosis. Evaluation completed in CICU. PLOF information limited due to patient on Bipap but per chart lives at home alone and independent. Today, patient UIC upon arrival, required CGA for transfers, and ambulated 40 MIPs- limited due to Bipap line. Mild endurance deficits noted. Patient may benefit from skilled PT services acutely to address functional deficits as well as improve level of independence prior to discharge. Anticipate and hopeful for patient to return home upon DC.      Anticipated Discharge Disposition (PT): home

## 2024-03-09 NOTE — PROGRESS NOTES
[facility]     Mar/chart reviewed  Follow up acute hypoxemic respiratory failure with +HMV and   Acute CHF  No chest pain, limited subjective due to nippv  Family at bedside    Vital Sign Min/Max for last 24 hours  Temp  Min: 97.8 °F (36.6 °C)  Max: 101.1 °F (38.4 °C)   BP  Min: 84/65  Max: 160/99   Pulse  Min: 73  Max: 94   Resp  Min: 28  Max: 31   SpO2  Min: 89 %  Max: 100 %   No data recorded   Weight  Min: 160 kg (353 lb 2.8 oz)  Max: 160 kg (353 lb 2.8 oz)   240/2525  Appears ill axox3,   perrl, eomi, normal sclera  mmm, no jvd, trachea midline, neck supple,  chest decreased ae bilaterally, + crackles, + wheezes,   rrr,   soft, nt, nd +bs,  no c/c/ 1+ edema  Skin warm, dry no rashes    Labs: 3/9: reviewed:  7.42/52/58  Glucose 120  Bun 25  Cr 1.01  Bicarb 30  Wbc 6  Hgb 17.2  Plts 159    Echo: nl ef and diastolic function read as normal dilation noted to la and ra    A/P:  Acute Hypoxemic and hypercapnic respiratory failure -- appears he has been on nippv pretty much continuously since admission. His hyyerpcapnia is compensated at this point, would be good to see if he can take some breaks suspect some chronic hypoxemia and hypercapnia. Should be on some form of pap at home on d/c  Acute on chronic diastolic chf -- diuresis - echo pretty normal looking. Continue on lasix  Penumonia -- possibly just viral -- given likely copd will add antibiotic  COPD with ae -- on steroids, bronchodilators  Encephalopathy --  awake approprate right now  Tobacco and marijuana abuse  Morbid obesity -- suspect restrictive lung disease/OHV/SHARMILA    Family says he had been on vent twice in the past had to have 60lbs of fluid diuresd off    CC 35 mins.

## 2024-03-09 NOTE — THERAPY EVALUATION
Patient Name: Josh Hinojosa  : 1975    MRN: [id number]                              Today's Date: 3/9/2024       Admit Date: 3/7/2024    Visit Dx:     ICD-10-CM ICD-9-CM   1. Acute hypoxemic respiratory failure  J96.01 518.81   2. Fever in adult  R50.9 780.60   3. Acute metabolic encephalopathy  G93.41 348.31   4. Acute pulmonary edema  J81.0 518.4   5. Pneumonia of left lower lobe due to infectious organism  J18.9 486   6. Hypervolemia, unspecified hypervolemia type  E87.70 276.69     Patient Active Problem List   Diagnosis    Acute hypoxemic respiratory failure     History reviewed. No pertinent past medical history.  History reviewed. No pertinent surgical history.   General Information       Row Name 24 1423          Physical Therapy Time and Intention    Document Type evaluation  -MS     Mode of Treatment physical therapy  -MS       Row Name 24 1423          General Information    Patient Profile Reviewed yes  -MS     Prior Level of Function independent:;all household mobility  -MS     Existing Precautions/Restrictions fall;oxygen therapy device and L/min  Bipap  -MS     Barriers to Rehab none identified  -MS       Row Name 24 1423          Living Environment    People in Home alone  -MS       Row Name 24 1423          Home Main Entrance    Number of Stairs, Main Entrance two  -MS       Row Name 24 1423          Stairs Within Home, Primary    Stairs Comment, Within Home, Primary bedroom is on main level.  -MS       Row Name 24 1423          Cognition    Orientation Status (Cognition) oriented x 4  -MS       Row Name 24 1423          Safety Issues, Functional Mobility    Impairments Affecting Function (Mobility) endurance/activity tolerance;shortness of breath;strength  -MS     Comment, Safety Issues/Impairments (Mobility) Gait belt and non skid socks donned.  -MS               User Key  (r) = Recorded By, (t) = Taken By, (c) = Cosigned By      Initials Name  Provider Type    Katie Coughlin ALEXEY, PT Physical Therapist                   Mobility       Row Name 03/09/24 1424          Bed Mobility    Comment, (Bed Mobility) NT- sitting UIC upon PT arrival.  -MS       Row Name 03/09/24 1424          Sit-Stand Transfer    Sit-Stand Warrick (Transfers) contact guard;verbal cues;nonverbal cues (demo/gesture)  -MS     Assistive Device (Sit-Stand Transfers) --  HHAx2  -MS       Row Name 03/09/24 1424          Gait/Stairs (Locomotion)    Warrick Level (Gait) contact guard;verbal cues;nonverbal cues (demo/gesture)  -MS     Assistive Device (Gait) --  HHAx2  -MS     Patient was able to Ambulate yes  -MS     Distance in Feet (Gait) 10 MIPs x 4 trials  -MS     Deviations/Abnormal Patterns (Gait) colten decreased;gait speed decreased  -MS     Bilateral Gait Deviations forward flexed posture  -MS     Comment, (Gait/Stairs) Limited due to BiPapline. O2 sats in 90s range throughout. Mild fatigue reported.  -MS               User Key  (r) = Recorded By, (t) = Taken By, (c) = Cosigned By      Initials Name Provider Type    Katie Coughlin ALEXEY, PT Physical Therapist                   Obj/Interventions       Row Name 03/09/24 1425          Range of Motion Comprehensive    Comment, General Range of Motion B LEs grossly WFL  -MS       Row Name 03/09/24 1425          Strength Comprehensive (MMT)    Comment, General Manual Muscle Testing (MMT) Assessment B LEs grossly 4/5  -MS       Row Name 03/09/24 1425          Balance    Balance Assessment sitting static balance;standing static balance  -MS     Static Sitting Balance standby assist  -MS     Position, Sitting Balance sitting edge of bed  -MS     Static Standing Balance contact guard  -MS     Position/Device Used, Standing Balance supported  -MS       Row Name 03/09/24 1425          Sensory Assessment (Somatosensory)    Sensory Assessment (Somatosensory) sensation intact  -MS               User Key  (r) = Recorded By, (t) = Taken  By, (c) = Cosigned By      Initials Name Provider Type    Katie Coughlin ALEXEY, PT Physical Therapist                   Goals/Plan       Row Name 03/09/24 1427          Bed Mobility Goal 1 (PT)    Activity/Assistive Device (Bed Mobility Goal 1, PT) bed mobility activities, all  -MS     Bremer Level/Cues Needed (Bed Mobility Goal 1, PT) supervision required  -MS     Time Frame (Bed Mobility Goal 1, PT) 1 week  -MS       Row Name 03/09/24 1427          Transfer Goal 1 (PT)    Activity/Assistive Device (Transfer Goal 1, PT) transfers, all  -MS     Bremer Level/Cues Needed (Transfer Goal 1, PT) supervision required  -MS     Time Frame (Transfer Goal 1, PT) 1 week  -MS       Row Name 03/09/24 1427          Gait Training Goal 1 (PT)    Activity/Assistive Device (Gait Training Goal 1, PT) gait (walking locomotion)  -MS     Bremer Level (Gait Training Goal 1, PT) supervision required  -MS     Distance (Gait Training Goal 1, PT) 150  -MS     Time Frame (Gait Training Goal 1, PT) 1 week  -MS       Row Name 03/09/24 1427          Therapy Assessment/Plan (PT)    Planned Therapy Interventions (PT) balance training;bed mobility training;gait training;home exercise program;postural re-education;patient/family education;ROM (range of motion);stair training;strengthening;transfer training  -MS               User Key  (r) = Recorded By, (t) = Taken By, (c) = Cosigned By      Initials Name Provider Type    Katie Coughlin, PT Physical Therapist                   Clinical Impression       Row Name 03/09/24 1426          Pain    Pretreatment Pain Rating 0/10 - no pain  -MS     Posttreatment Pain Rating 0/10 - no pain  -MS       Row Name 03/09/24 1426          Therapy Assessment/Plan (PT)    Rehab Potential (PT) good, to achieve stated therapy goals  -MS     Criteria for Skilled Interventions Met (PT) yes;meets criteria  -MS     Therapy Frequency (PT) 5 times/wk  -MS       Row Name 03/09/24 1426           Positioning and Restraints    Pre-Treatment Position sitting in chair/recliner  no exit alarm  -MS     Post Treatment Position chair  -MS     In Chair reclined;call light within reach;encouraged to call for assist  -MS               User Key  (r) = Recorded By, (t) = Taken By, (c) = Cosigned By      Initials Name Provider Type    Katie Coughlin, PT Physical Therapist                   Outcome Measures       Row Name 03/09/24 1427          How much help from another person do you currently need...    Turning from your back to your side while in flat bed without using bedrails? 3  -MS     Moving from lying on back to sitting on the side of a flat bed without bedrails? 3  -MS     Moving to and from a bed to a chair (including a wheelchair)? 3  -MS     Standing up from a chair using your arms (e.g., wheelchair, bedside chair)? 3  -MS     Climbing 3-5 steps with a railing? 3  -MS     To walk in hospital room? 3  -MS     AM-PAC 6 Clicks Score (PT) 18  -MS     Highest Level of Mobility Goal 6 --> Walk 10 steps or more  -MS               User Key  (r) = Recorded By, (t) = Taken By, (c) = Cosigned By      Initials Name Provider Type    MS WhitleysKatie, PT Physical Therapist                                 Physical Therapy Education       Title: PT OT SLP Therapies (Done)       Topic: Physical Therapy (Done)       Point: Mobility training (Done)       Learning Progress Summary             Patient Acceptance, E,TB, VU by MS at 3/9/2024 1428                         Point: Home exercise program (Done)       Learning Progress Summary             Patient Acceptance, E,TB, VU by MS at 3/9/2024 1428                         Point: Body mechanics (Done)       Learning Progress Summary             Patient Acceptance, E,TB, VU by MS at 3/9/2024 1428                         Point: Precautions (Done)       Learning Progress Summary             Patient Acceptance, E,TB, VU by MS at 3/9/2024 1428                                          User Key       Initials Effective Dates Name Provider Type Discipline    MS 06/16/21 -  Katie Bone, PT Physical Therapist PT                  PT Recommendation and Plan  Planned Therapy Interventions (PT): balance training, bed mobility training, gait training, home exercise program, postural re-education, patient/family education, ROM (range of motion), stair training, strengthening, transfer training        Time Calculation:         PT Charges       Row Name 03/09/24 1423             Time Calculation    Start Time 1315  -MS      Stop Time 1330  -MS      Time Calculation (min) 15 min  -MS      PT Received On 03/09/24  -MS      PT - Next Appointment 03/11/24  -MS      PT Goal Re-Cert Due Date 03/16/24  -MS                User Key  (r) = Recorded By, (t) = Taken By, (c) = Cosigned By      Initials Name Provider Type    MS Bone, Katie BLACK[place]URN, PT Physical Therapist                  Therapy Charges for Today       Code Description Service Date Service Provider Modifiers Qty    55112913853 HC PT EVAL MOD COMPLEXITY 3 3/9/2024 Katie Bone, PT GP 1    80181221312 HC PT THER PROC EA 15 MIN 3/9/2024 Katie Bone, PT GP 1            PT G-Codes  AM-PAC 6 Clicks Score (PT): 18  PT Discharge Summary  Anticipated Discharge Disposition (PT): home    Katie Bone PT  3/9/2024

## 2024-03-10 LAB
ANION GAP SERPL CALCULATED.3IONS-SCNC: 6 MMOL/L (ref 5–15)
BASOPHILS # BLD AUTO: 0.01 10*3/MM3 (ref 0–0.2)
BASOPHILS NFR BLD AUTO: 0.2 % (ref 0–1.5)
BUN SERPL-MCNC: 29 MG/DL (ref 6–20)
BUN/CREAT SERPL: 38.2 (ref 7–25)
CALCIUM SPEC-SCNC: 8.8 MG/DL (ref 8.6–10.5)
CHLORIDE SERPL-SCNC: 96 MMOL/L (ref 98–107)
CO2 SERPL-SCNC: 33 MMOL/L (ref 22–29)
CREAT SERPL-MCNC: 0.76 MG/DL (ref 0.76–1.27)
DEPRECATED RDW RBC AUTO: 43.1 FL (ref 37–54)
EGFRCR SERPLBLD CKD-EPI 2021: 111.6 ML/MIN/1.73
EOSINOPHIL # BLD AUTO: 0 10*3/MM3 (ref 0–0.4)
EOSINOPHIL NFR BLD AUTO: 0 % (ref 0.3–6.2)
ERYTHROCYTE [DISTWIDTH] IN BLOOD BY AUTOMATED COUNT: 13.3 % (ref 12.3–15.4)
GLUCOSE BLDC GLUCOMTR-MCNC: 100 MG/DL (ref 70–130)
GLUCOSE BLDC GLUCOMTR-MCNC: 129 MG/DL (ref 70–130)
GLUCOSE BLDC GLUCOMTR-MCNC: 137 MG/DL (ref 70–130)
GLUCOSE SERPL-MCNC: 128 MG/DL (ref 65–99)
HCT VFR BLD AUTO: 50.2 % (ref 37.5–51)
HGB BLD-MCNC: 16.2 G/DL (ref 13–17.7)
IMM GRANULOCYTES # BLD AUTO: 0.03 10*3/MM3 (ref 0–0.05)
IMM GRANULOCYTES NFR BLD AUTO: 0.6 % (ref 0–0.5)
LYMPHOCYTES # BLD AUTO: 0.52 10*3/MM3 (ref 0.7–3.1)
LYMPHOCYTES NFR BLD AUTO: 9.8 % (ref 19.6–45.3)
MCH RBC QN AUTO: 28.8 PG (ref 26.6–33)
MCHC RBC AUTO-ENTMCNC: 32.3 G/DL (ref 31.5–35.7)
MCV RBC AUTO: 89.3 FL (ref 79–97)
MONOCYTES # BLD AUTO: 0.33 10*3/MM3 (ref 0.1–0.9)
MONOCYTES NFR BLD AUTO: 6.2 % (ref 5–12)
NEUTROPHILS NFR BLD AUTO: 4.41 10*3/MM3 (ref 1.7–7)
NEUTROPHILS NFR BLD AUTO: 83.2 % (ref 42.7–76)
NRBC BLD AUTO-RTO: 0 /100 WBC (ref 0–0.2)
PLATELET # BLD AUTO: 172 10*3/MM3 (ref 140–450)
PMV BLD AUTO: 9.4 FL (ref 6–12)
POTASSIUM SERPL-SCNC: 4.3 MMOL/L (ref 3.5–5.2)
PROCALCITONIN SERPL-MCNC: 0.43 NG/ML (ref 0–0.25)
RBC # BLD AUTO: 5.62 10*6/MM3 (ref 4.14–5.8)
SODIUM SERPL-SCNC: 135 MMOL/L (ref 136–145)
WBC NRBC COR # BLD AUTO: 5.3 10*3/MM3 (ref 3.4–10.8)

## 2024-03-10 PROCEDURE — 85025 COMPLETE CBC W/AUTO DIFF WBC: CPT

## 2024-03-10 PROCEDURE — 82948 REAGENT STRIP/BLOOD GLUCOSE: CPT

## 2024-03-10 PROCEDURE — 25010000002 FUROSEMIDE PER 20 MG: Performed by: INTERNAL MEDICINE

## 2024-03-10 PROCEDURE — 25810000003 SODIUM CHLORIDE 0.9 % SOLUTION 250 ML FLEX CONT: Performed by: INTERNAL MEDICINE

## 2024-03-10 PROCEDURE — 94761 N-INVAS EAR/PLS OXIMETRY MLT: CPT

## 2024-03-10 PROCEDURE — 80048 BASIC METABOLIC PNL TOTAL CA: CPT

## 2024-03-10 PROCEDURE — 94664 DEMO&/EVAL PT USE INHALER: CPT

## 2024-03-10 PROCEDURE — 94799 UNLISTED PULMONARY SVC/PX: CPT

## 2024-03-10 PROCEDURE — 84145 PROCALCITONIN (PCT): CPT | Performed by: INTERNAL MEDICINE

## 2024-03-10 PROCEDURE — 25010000002 METHYLPREDNISOLONE PER 40 MG: Performed by: INTERNAL MEDICINE

## 2024-03-10 PROCEDURE — 94660 CPAP INITIATION&MGMT: CPT

## 2024-03-10 PROCEDURE — 94760 N-INVAS EAR/PLS OXIMETRY 1: CPT

## 2024-03-10 PROCEDURE — 25010000002 CEFTRIAXONE PER 250 MG: Performed by: INTERNAL MEDICINE

## 2024-03-10 PROCEDURE — 25010000002 LABETALOL 5 MG/ML SOLUTION: Performed by: INTERNAL MEDICINE

## 2024-03-10 PROCEDURE — 25010000002 ENOXAPARIN PER 10 MG: Performed by: INTERNAL MEDICINE

## 2024-03-10 PROCEDURE — 25010000002 AZITHROMYCIN PER 500 MG: Performed by: INTERNAL MEDICINE

## 2024-03-10 RX ORDER — ENALAPRILAT 1.25 MG/ML
1.25 INJECTION INTRAVENOUS EVERY 6 HOURS PRN
Status: DISCONTINUED | OUTPATIENT
Start: 2024-03-10 | End: 2024-03-13 | Stop reason: HOSPADM

## 2024-03-10 RX ORDER — LISINOPRIL 10 MG/1
10 TABLET ORAL
Status: DISCONTINUED | OUTPATIENT
Start: 2024-03-10 | End: 2024-03-13 | Stop reason: HOSPADM

## 2024-03-10 RX ADMIN — FUROSEMIDE 40 MG: 10 INJECTION, SOLUTION INTRAMUSCULAR; INTRAVENOUS at 08:00

## 2024-03-10 RX ADMIN — METHYLPREDNISOLONE SODIUM SUCCINATE 40 MG: 40 INJECTION, POWDER, FOR SOLUTION INTRAMUSCULAR; INTRAVENOUS at 14:07

## 2024-03-10 RX ADMIN — LISINOPRIL 10 MG: 10 TABLET ORAL at 13:59

## 2024-03-10 RX ADMIN — AZITHROMYCIN MONOHYDRATE 500 MG: 500 INJECTION, POWDER, LYOPHILIZED, FOR SOLUTION INTRAVENOUS at 16:17

## 2024-03-10 RX ADMIN — METHYLPREDNISOLONE SODIUM SUCCINATE 40 MG: 40 INJECTION, POWDER, FOR SOLUTION INTRAMUSCULAR; INTRAVENOUS at 01:19

## 2024-03-10 RX ADMIN — LABETALOL HYDROCHLORIDE 10 MG: 5 INJECTION, SOLUTION INTRAVENOUS at 04:13

## 2024-03-10 RX ADMIN — IPRATROPIUM BROMIDE AND ALBUTEROL SULFATE 3 ML: 2.5; .5 SOLUTION RESPIRATORY (INHALATION) at 14:56

## 2024-03-10 RX ADMIN — IPRATROPIUM BROMIDE AND ALBUTEROL SULFATE 3 ML: 2.5; .5 SOLUTION RESPIRATORY (INHALATION) at 20:12

## 2024-03-10 RX ADMIN — IPRATROPIUM BROMIDE AND ALBUTEROL SULFATE 3 ML: 2.5; .5 SOLUTION RESPIRATORY (INHALATION) at 07:01

## 2024-03-10 RX ADMIN — IPRATROPIUM BROMIDE AND ALBUTEROL SULFATE 3 ML: 2.5; .5 SOLUTION RESPIRATORY (INHALATION) at 12:19

## 2024-03-10 RX ADMIN — CEFTRIAXONE 2000 MG: 2 INJECTION, POWDER, FOR SOLUTION INTRAMUSCULAR; INTRAVENOUS at 14:00

## 2024-03-10 RX ADMIN — ENOXAPARIN SODIUM 40 MG: 100 INJECTION SUBCUTANEOUS at 20:02

## 2024-03-10 RX ADMIN — IPRATROPIUM BROMIDE AND ALBUTEROL SULFATE 3 ML: 2.5; .5 SOLUTION RESPIRATORY (INHALATION) at 04:34

## 2024-03-10 RX ADMIN — ENOXAPARIN SODIUM 40 MG: 100 INJECTION SUBCUTANEOUS at 08:00

## 2024-03-10 RX ADMIN — FUROSEMIDE 40 MG: 10 INJECTION, SOLUTION INTRAMUSCULAR; INTRAVENOUS at 17:14

## 2024-03-10 RX ADMIN — LABETALOL HYDROCHLORIDE 10 MG: 5 INJECTION, SOLUTION INTRAVENOUS at 07:59

## 2024-03-10 RX ADMIN — DOCUSATE SODIUM 50MG AND SENNOSIDES 8.6MG 2 TABLET: 8.6; 5 TABLET, FILM COATED ORAL at 08:00

## 2024-03-10 RX ADMIN — IPRATROPIUM BROMIDE AND ALBUTEROL SULFATE 3 ML: 2.5; .5 SOLUTION RESPIRATORY (INHALATION) at 23:27

## 2024-03-10 NOTE — PLAN OF CARE
Goal Outcome Evaluation:         Pt remains in cicu, on 8L highflow weaned down from 12L this morning. Up to the chair/toilet independently with SBA. No pain, started HTN meds. Good U/O. VSS.

## 2024-03-10 NOTE — PROGRESS NOTES
[facility]    Mar/chart reviewed  Follow up acute hypoxemic respiratory failure with +HMV and   Acute CHF  No chest pain, able to tolerate some time off nippv but requires a good deal of oxygen still  Remains hypertensive  +cough with sputum production, purulent    Vital Sign Min/Max for last 24 hours  Temp  Min: 98 °F (36.7 °C)  Max: 98.9 °F (37.2 °C)   BP  Min: 124/74  Max: 188/95   Pulse  Min: 65  Max: 88   Resp  Min: 24  Max: 28   SpO2  Min: 86 %  Max: 97 %   Flow (L/min)  Min: 8  Max: 12   Weight  Min: 162 kg (356 lb 0.7 oz)  Max: 162 kg (356 lb 0.7 oz)   120/2675    Appears ill axox3,   perrl, eomi, normal sclera  mmm, no jvd, trachea midline, neck supple,  chest decreased ae bilaterally, + crackles, + wheezes,   rrr,   soft, nt, nd +bs,  no c/c/ 1+ edema  Skin warm, dry no rashes    Labs: 3/10: reviewed:  Glucose 128  Bun 29  Cr 0.76  Na 135  Wbc 5.3  Hgb 16.2  Plts 172    3/9: CXR: reviewed: bilateral infilrates more dense on the right base    Echo: nl ef and diastolic function read as normal dilation noted to la and ra     A/P:  Acute Hypoxemic and hypercapnic respiratory failure -- appears he has been on nippv pretty much continuously since admission. His hyyerpcapnia is compensated at this point, would be good to see if he can take some breaks suspect some chronic hypoxemia and hypercapnia. Should be on some form of pap at home on d/c., oxygen sats goal 88=92 off nippv  Acute on chronic diastolic chf -- diuresis - echo pretty normal looking. Continue on lasix  Penumonia -- continue antibiotic  COPD with ae -- on steroids, bronchodilators  Encephalopathy --  awake approprate right now  Tobacco and marijuana abuse  Morbid obesity -- suspect restrictive lung disease/OHV/SHARMILA  HTN -- will add some po meds to get some better control    Difficult to tell home much chf/volume overload there is as opposed to just pna and mixed obstruction restrictive lung disease with HMV infection and pna.   Probably should have cardiology look at him  He has some better air movement today    CC 35 mins.

## 2024-03-10 NOTE — PLAN OF CARE
Problem: Skin Injury Risk Increased  Goal: Skin Health and Integrity  Outcome: Ongoing, Progressing     Problem: Adult Inpatient Plan of Care  Goal: Plan of Care Review  Outcome: Ongoing, Progressing  Goal: Patient-Specific Goal (Individualized)  Outcome: Ongoing, Progressing  Goal: Absence of Hospital-Acquired Illness or Injury  Outcome: Ongoing, Progressing  Intervention: Identify and Manage Fall Risk  Recent Flowsheet Documentation  Taken 3/10/2024 0100 by Cedrick Valentin RN  Safety Promotion/Fall Prevention:   activity supervised   safety round/check completed  Taken 3/10/2024 0000 by Cedrick Valentin RN  Safety Promotion/Fall Prevention:   activity supervised   safety round/check completed  Intervention: Prevent Infection  Recent Flowsheet Documentation  Taken 3/10/2024 0100 by Cedrick Valentin RN  Infection Prevention: single patient room provided  Taken 3/10/2024 0000 by Cedrick Valentin RN  Infection Prevention: single patient room provided  Goal: Optimal Comfort and Wellbeing  Outcome: Ongoing, Progressing  Goal: Readiness for Transition of Care  Outcome: Ongoing, Progressing     Problem: Obstructive Sleep Apnea Risk or Actual Comorbidity Management  Goal: Unobstructed Breathing During Sleep  Outcome: Ongoing, Progressing     Problem: Noninvasive Ventilation Acute  Goal: Effective Unassisted Ventilation and Oxygenation  Outcome: Ongoing, Progressing     Problem: Adjustment to Illness (Sepsis/Septic Shock)  Goal: Optimal Coping  Outcome: Ongoing, Progressing     Problem: Bleeding (Sepsis/Septic Shock)  Goal: Absence of Bleeding  Outcome: Ongoing, Progressing     Problem: Glycemic Control Impaired (Sepsis/Septic Shock)  Goal: Blood Glucose Level Within Desired Range  Outcome: Ongoing, Progressing     Problem: Infection Progression (Sepsis/Septic Shock)  Goal: Absence of Infection Signs and Symptoms  Outcome: Ongoing, Progressing  Intervention: Initiate Sepsis Management  Recent Flowsheet  Documentation  Taken 3/10/2024 0100 by Cedrick Valentin RN  Infection Prevention: single patient room provided  Taken 3/10/2024 0000 by Cedrick Valentin RN  Infection Prevention: single patient room provided     Problem: Nutrition Impaired (Sepsis/Septic Shock)  Goal: Optimal Nutrition Intake  Outcome: Ongoing, Progressing     Problem: Fall Injury Risk  Goal: Absence of Fall and Fall-Related Injury  Outcome: Ongoing, Progressing  Intervention: Identify and Manage Contributors  Recent Flowsheet Documentation  Taken 3/10/2024 0100 by Cedrick Vaelntin RN  Medication Review/Management: medications reviewed  Taken 3/10/2024 0000 by Cedrick Valentin RN  Medication Review/Management: medications reviewed  Intervention: Promote Injury-Free Environment  Recent Flowsheet Documentation  Taken 3/10/2024 0100 by Cedrick Valentin RN  Safety Promotion/Fall Prevention:   activity supervised   safety round/check completed  Taken 3/10/2024 0000 by Cedrick Valentin RN  Safety Promotion/Fall Prevention:   activity supervised   safety round/check completed   Goal Outcome Evaluation:

## 2024-03-11 LAB
ANION GAP SERPL CALCULATED.3IONS-SCNC: 9.4 MMOL/L (ref 5–15)
BUN SERPL-MCNC: 22 MG/DL (ref 6–20)
BUN/CREAT SERPL: 33.8 (ref 7–25)
CALCIUM SPEC-SCNC: 8.6 MG/DL (ref 8.6–10.5)
CHLORIDE SERPL-SCNC: 97 MMOL/L (ref 98–107)
CO2 SERPL-SCNC: 32.6 MMOL/L (ref 22–29)
CREAT SERPL-MCNC: 0.65 MG/DL (ref 0.76–1.27)
EGFRCR SERPLBLD CKD-EPI 2021: 117 ML/MIN/1.73
GLUCOSE BLDC GLUCOMTR-MCNC: 139 MG/DL (ref 70–130)
GLUCOSE SERPL-MCNC: 117 MG/DL (ref 65–99)
POTASSIUM SERPL-SCNC: 4.2 MMOL/L (ref 3.5–5.2)
SODIUM SERPL-SCNC: 139 MMOL/L (ref 136–145)

## 2024-03-11 PROCEDURE — 97530 THERAPEUTIC ACTIVITIES: CPT

## 2024-03-11 PROCEDURE — 94799 UNLISTED PULMONARY SVC/PX: CPT

## 2024-03-11 PROCEDURE — 25010000002 ENOXAPARIN PER 10 MG: Performed by: INTERNAL MEDICINE

## 2024-03-11 PROCEDURE — 25010000002 FUROSEMIDE PER 20 MG: Performed by: INTERNAL MEDICINE

## 2024-03-11 PROCEDURE — 25010000002 AZITHROMYCIN PER 500 MG: Performed by: INTERNAL MEDICINE

## 2024-03-11 PROCEDURE — 94660 CPAP INITIATION&MGMT: CPT

## 2024-03-11 PROCEDURE — 25010000002 HYDRALAZINE PER 20 MG

## 2024-03-11 PROCEDURE — 25010000002 METHYLPREDNISOLONE PER 40 MG: Performed by: INTERNAL MEDICINE

## 2024-03-11 PROCEDURE — 80048 BASIC METABOLIC PNL TOTAL CA: CPT

## 2024-03-11 PROCEDURE — 94761 N-INVAS EAR/PLS OXIMETRY MLT: CPT

## 2024-03-11 PROCEDURE — 94664 DEMO&/EVAL PT USE INHALER: CPT

## 2024-03-11 PROCEDURE — 94760 N-INVAS EAR/PLS OXIMETRY 1: CPT

## 2024-03-11 PROCEDURE — 82948 REAGENT STRIP/BLOOD GLUCOSE: CPT

## 2024-03-11 PROCEDURE — 25010000002 CEFTRIAXONE PER 250 MG: Performed by: INTERNAL MEDICINE

## 2024-03-11 PROCEDURE — 25810000003 SODIUM CHLORIDE 0.9 % SOLUTION 250 ML FLEX CONT: Performed by: INTERNAL MEDICINE

## 2024-03-11 RX ORDER — HYDRALAZINE HYDROCHLORIDE 20 MG/ML
10 INJECTION INTRAMUSCULAR; INTRAVENOUS ONCE
Status: COMPLETED | OUTPATIENT
Start: 2024-03-11 | End: 2024-03-11

## 2024-03-11 RX ADMIN — IPRATROPIUM BROMIDE AND ALBUTEROL SULFATE 3 ML: 2.5; .5 SOLUTION RESPIRATORY (INHALATION) at 03:01

## 2024-03-11 RX ADMIN — IPRATROPIUM BROMIDE AND ALBUTEROL SULFATE 3 ML: 2.5; .5 SOLUTION RESPIRATORY (INHALATION) at 19:23

## 2024-03-11 RX ADMIN — FUROSEMIDE 40 MG: 10 INJECTION, SOLUTION INTRAMUSCULAR; INTRAVENOUS at 09:08

## 2024-03-11 RX ADMIN — IPRATROPIUM BROMIDE AND ALBUTEROL SULFATE 3 ML: 2.5; .5 SOLUTION RESPIRATORY (INHALATION) at 08:17

## 2024-03-11 RX ADMIN — METHYLPREDNISOLONE SODIUM SUCCINATE 40 MG: 40 INJECTION, POWDER, FOR SOLUTION INTRAMUSCULAR; INTRAVENOUS at 14:42

## 2024-03-11 RX ADMIN — AZITHROMYCIN MONOHYDRATE 500 MG: 500 INJECTION, POWDER, LYOPHILIZED, FOR SOLUTION INTRAVENOUS at 16:19

## 2024-03-11 RX ADMIN — FUROSEMIDE 40 MG: 10 INJECTION, SOLUTION INTRAMUSCULAR; INTRAVENOUS at 17:30

## 2024-03-11 RX ADMIN — ENOXAPARIN SODIUM 40 MG: 100 INJECTION SUBCUTANEOUS at 09:08

## 2024-03-11 RX ADMIN — LISINOPRIL 10 MG: 10 TABLET ORAL at 09:08

## 2024-03-11 RX ADMIN — METHYLPREDNISOLONE SODIUM SUCCINATE 40 MG: 40 INJECTION, POWDER, FOR SOLUTION INTRAMUSCULAR; INTRAVENOUS at 03:00

## 2024-03-11 RX ADMIN — IPRATROPIUM BROMIDE AND ALBUTEROL SULFATE 3 ML: 2.5; .5 SOLUTION RESPIRATORY (INHALATION) at 10:41

## 2024-03-11 RX ADMIN — ENOXAPARIN SODIUM 40 MG: 100 INJECTION SUBCUTANEOUS at 20:25

## 2024-03-11 RX ADMIN — CEFTRIAXONE 2000 MG: 2 INJECTION, POWDER, FOR SOLUTION INTRAMUSCULAR; INTRAVENOUS at 14:42

## 2024-03-11 RX ADMIN — IPRATROPIUM BROMIDE AND ALBUTEROL SULFATE 3 ML: 2.5; .5 SOLUTION RESPIRATORY (INHALATION) at 23:29

## 2024-03-11 RX ADMIN — HYDRALAZINE HYDROCHLORIDE 10 MG: 20 INJECTION, SOLUTION INTRAMUSCULAR; INTRAVENOUS at 05:00

## 2024-03-11 NOTE — PROGRESS NOTES
"[facility]    Mar/chart reviewed  Follow up acute hypoxemic respiratory failure with +HMV and   Acute CHF  No chest pain, better able to tolerate being off nippv, oxygen requirement improving    Vital Sign Min/Max for last 24 hours  Temp  Min: 97.3 °F (36.3 °C)  Max: 98.1 °F (36.7 °C)   BP  Min: 124/84  Max: 186/79   Pulse  Min: 62  Max: 102   Resp  Min: 20  Max: 28   SpO2  Min: 86 %  Max: 98 %   Flow (L/min)  Min: 8  Max: 12   Weight  Min: 160 kg (353 lb 13.4 oz)  Max: 160 kg (353 lb 13.4 oz)   450/3425    Appears ill axox3,   perrl, eomi, normal sclera  mmm, no jvd, trachea midline, neck supple,  chest decreased ae bilaterally, + crackles, + wheezes, more on the right lower lobe, ae is slightly better today  rrr,   soft, nt, nd +bs,  no c/c/ 1+ edema  Skin warm, dry no rashes    Labs: 3/11: reviewed:  Glucose 117  Bun 22  Cr 0.65  Bicarb 32    Cardiologies plan is \" antibiotics for na, no gross fl vol overload\" (?)    A/P:  Acute Hypoxemic and hypercapnic respiratory failure -- appears he has been on nippv pretty much continuously since admission. His hyyerpcapnia is compensated at this point, would be good to see if he can take some breaks suspect some chronic hypoxemia and hypercapnia. Should be on some form of pap at home on d/c., oxygen sats goal 88=92 off nippv  Acute on chronic diastolic chf -- diuresis - echo pretty normal looking. Continue on lasix  Penumonia -- continue antibiotic  COPD with ae -- on steroids, bronchodilators  Encephalopathy --  awake approprate right now  Tobacco and marijuana abuse  Morbid obesity -- suspect restrictive lung disease/OHV/SHARMILA  HTN --  po meds now with better control    Seems to be improving, will continue the iv lasix for today; unless cardiology feels otherwise.  Will hopefully be able to go to po steroids tomorrow.    Can transfer out of unit today  "

## 2024-03-11 NOTE — PLAN OF CARE
Goal Outcome Evaluation:  Plan of Care Reviewed With: patient        Progress: improving  Outcome Evaluation: Pt transferred to 4S. Vss, 12L hfnc, sr, a/o x4. IV lasix. IV abx.

## 2024-03-11 NOTE — PLAN OF CARE
Goal Outcome Evaluation:     Patient tolerated Bipap all night. No complaints of pain. Hydralazine given x1. 1700 urine output. Currently awaiting am labs.

## 2024-03-11 NOTE — CONSULTS
Josh Hinojosa   48 y.o.  male    LOS: 4 days   Patient Care Team:  Provider, No Known as PCP - General      Subjective     Chief Complaint: soa    History of Present Illness: Mr spring is a 48-year-old male who follows with Dr Alberto who presented with altered mental status and shortness of breath. Has a history of cardiomyopathy and continues to smoke cigarettes and marijuana on a regular basis. He states hasn't been feeling well the last few weeks with fatigue and weakness then he developed soa and prod cough. No chest pains no edema. No weight gain    PMH  Essential HTN  HLD  HFpEF EF estimated to be 55 to 60% 2021.   COPD  GENARO    Echo summary from 12/7/2021  Technically difficult study with limited views  Left ventricle size is normal  Moderate concentric left ventricular hypertrophy  Grossly normal left ventricular contractility  Ejection fraction is visually estimated at 55 to 60%  Moderately dilated left atrium  Mildly enlarged right ventricular cavity  Structurally normal aortic valve  Structurally normal mitral valve  Small to moderate sized circumferential pericardial effusion     History reviewed. No pertinent past medical history.  History reviewed. No pertinent surgical history.  No medications prior to admission.       History reviewed. No pertinent family history.  Social History     Socioeconomic History    Marital status: Single   Tobacco Use    Smoking status: Every Day     Current packs/day: 1.00     Types: Cigarettes   Vaping Use    Vaping status: Never Used   Substance and Sexual Activity    Alcohol use: Never    Drug use: Yes     Types: Marijuana    Sexual activity: Defer     Objective       Review of Systems:   Constitutional: Negative for diaphoresis, fatigue, fever and unexpected weight change.   HENT: Negative.    Eyes: Negative.    Respiratory: Negative for cough, shortness of breath and wheezing.    Cardiovascular: Negative for chest pain, palpitations and leg swelling.   Gastrointestinal:  Negative for abdominal pain, blood in stool, constipation, diarrhea, nausea and vomiting.   Endocrine: Negative.    Genitourinary: Negative for difficulty urinating, dysuria and frequency.   Musculoskeletal: Negative.    Skin: Negative.    Allergic/Immunologic: Negative for environmental allergies and food allergies.   Neurological: Negative.    Hematological: Negative.    Psychiatric/Behavioral: Negative.        Current Facility-Administered Medications:     acetaminophen (TYLENOL) tablet 650 mg, 650 mg, Oral, Q4H PRN, 650 mg at 03/09/24 0631 **OR** acetaminophen (TYLENOL) suppository 650 mg, 650 mg, Rectal, Q4H PRN, Emery Hauser MD    aluminum-magnesium hydroxide-simethicone (MAALOX MAX) 400-400-40 MG/5ML suspension 15 mL, 15 mL, Oral, Q6H PRN, Emery Hauser MD    azithromycin (ZITHROMAX) 500 mg in sodium chloride 0.9 % 250 mL IVPB-VTB, 500 mg, Intravenous, Q24H, Dougie Lang MD, 500 mg at 03/10/24 1617    sennosides-docusate (PERICOLACE) 8.6-50 MG per tablet 2 tablet, 2 tablet, Oral, BID, 2 tablet at 03/10/24 0800 **AND** polyethylene glycol (MIRALAX) packet 17 g, 17 g, Oral, Daily PRN **AND** bisacodyl (DULCOLAX) EC tablet 5 mg, 5 mg, Oral, Daily PRN **AND** bisacodyl (DULCOLAX) suppository 10 mg, 10 mg, Rectal, Daily PRN, Emery Hauser MD    Calcium Replacement - Follow Nurse / BPA Driven Protocol, , Does not apply, PRN, Emery Hauser MD    cefTRIAXone (ROCEPHIN) 2,000 mg in sodium chloride 0.9 % 100 mL IVPB-VTB, 2,000 mg, Intravenous, Q24H, Emery Hauser MD, Last Rate: 200 mL/hr at 03/10/24 1400, 2,000 mg at 03/10/24 1400    enalaprilat (VASOTEC) injection 1.25 mg, 1.25 mg, Intravenous, Q6H PRN, Dougie Lang MD    Enoxaparin Sodium (LOVENOX) syringe 40 mg, 40 mg, Subcutaneous, Q12H, Emery Hauser MD, 40 mg at 03/11/24 0908    furosemide (LASIX) injection 40 mg, 40 mg, Intravenous, BID, Dougie Lang MD, 40 mg at 03/11/24 0908    ipratropium-albuterol  (DUO-NEB) nebulizer solution 3 mL, 3 mL, Nebulization, Q4H - RT, Emery Hauser MD, 3 mL at 03/11/24 0817    ipratropium-albuterol (DUO-NEB) nebulizer solution 3 mL, 3 mL, Nebulization, Q2H PRN, Emery Hauser MD    labetalol (NORMODYNE,TRANDATE) injection 10 mg, 10 mg, Intravenous, Q4H PRN, Laura Singh MD, 10 mg at 03/10/24 0759    lisinopril (PRINIVIL,ZESTRIL) tablet 10 mg, 10 mg, Oral, Q24H, Dougie Lang MD, 10 mg at 03/11/24 0908    Magnesium Standard Dose Replacement - Follow Nurse / BPA Driven Protocol, , Does not apply, PRN, Emery Hauser MD    methylPREDNISolone sodium succinate (SOLU-Medrol) injection 40 mg, 40 mg, Intravenous, Q12H, Emery Hauser MD, 40 mg at 03/11/24 0300    nitroglycerin (NITROSTAT) SL tablet 0.4 mg, 0.4 mg, Sublingual, Q5 Min PRN, Emery Hauser MD    ondansetron ODT (ZOFRAN-ODT) disintegrating tablet 4 mg, 4 mg, Oral, Q6H PRN **OR** ondansetron (ZOFRAN) injection 4 mg, 4 mg, Intravenous, Q6H PRN, Emery Hauser MD    Phosphorus Replacement - Follow Nurse / BPA Driven Protocol, , Does not apply, PRMurtaza ANDRADE Mark Edwin, MD    Potassium Replacement - Follow Nurse / BPA Driven Protocol, , Does not apply, Murtaza RODRIGUES Mark Edwin, MD    [COMPLETED] Insert Peripheral IV, , , Once **AND** sodium chloride 0.9 % flush 10 mL, 10 mL, Intravenous, PRN, Deny Richey MD      Physical Exam:   Vital Sign Min/Max for last 24 hours  Temp  Min: 97.3 °F (36.3 °C)  Max: 98.3 °F (36.8 °C)   BP  Min: 124/84  Max: 188/95    Pulse  Min: 62  Max: 102     Wt Readings from Last 3 Encounters:   03/11/24 (!) 160 kg (353 lb 13.4 oz)       General Appearance:  Awake,  Alert, cooperative, obese guero siting up in chair in no acute distress   Head:  Normocephalic, without obvious abnormality, atraumatic   Eyes:          Conjunctivae normal, anicteric, eom intact    Neck: No adenopathy, supple, trachea midline, no thyromegaly, no   carotid bruit, no JVD, no elevated cvp  "  Lungs:   Wheezing hina respirations regular, even and                  unlabored    Heart:  Regular rhythm and normal rate, normal S1 and S2,            No murmur, no gallop, no rub, no click    Chest Wall:  No abnormalities observed   Abdomen:   Normal bowel sounds, no masses, soft nontender, nondistended                    Rectal:   Deferred   Extremities: No edema. Moves all extremities well, no cyanosis, no erythema   Pulses: Pulses palpable and equal bilaterally   Skin: No bleeding, bruising or rash   Neurologic: Speech clear and appropriate, no facial drooping     : Fc yellow uo      MONITOR: nsr    Results Review:     Sodium Sodium   Date Value Ref Range Status   03/11/2024 139 136 - 145 mmol/L Final   03/10/2024 135 (L) 136 - 145 mmol/L Final   03/09/2024 136 136 - 145 mmol/L Final      Potassium Potassium   Date Value Ref Range Status   03/11/2024 4.2 3.5 - 5.2 mmol/L Final   03/10/2024 4.3 3.5 - 5.2 mmol/L Final   03/09/2024 4.3 3.5 - 5.2 mmol/L Final     Comment:     Slight hemolysis detected by analyzer. Result may be falsely elevated.      Chloride Chloride   Date Value Ref Range Status   03/11/2024 97 (L) 98 - 107 mmol/L Final   03/10/2024 96 (L) 98 - 107 mmol/L Final   03/09/2024 92 (L) 98 - 107 mmol/L Final      Bicarbonate No results found for: \"PLASMABICARB\"   BUN BUN   Date Value Ref Range Status   03/11/2024 22 (H) 6 - 20 mg/dL Final   03/10/2024 29 (H) 6 - 20 mg/dL Final   03/09/2024 25 (H) 6 - 20 mg/dL Final      Creatinine Creatinine   Date Value Ref Range Status   03/11/2024 0.65 (L) 0.76 - 1.27 mg/dL Final   03/10/2024 0.76 0.76 - 1.27 mg/dL Final   03/09/2024 1.01 0.76 - 1.27 mg/dL Final      Calcium Calcium   Date Value Ref Range Status   03/11/2024 8.6 8.6 - 10.5 mg/dL Final   03/10/2024 8.8 8.6 - 10.5 mg/dL Final   03/09/2024 8.6 8.6 - 10.5 mg/dL Final      Magnesium No results found for: \"MG\"     Results from last 7 days   Lab Units 03/10/24  0419   WBC 10*3/mm3 5.30   HEMOGLOBIN " "g/dL 16.2   HEMATOCRIT % 50.2   PLATELETS 10*3/mm3 172     Lab Results   Lab Value Date/Time    TROPONINT 44 (H) 03/07/2024 1742    TROPONINT 38 (H) 03/07/2024 1410     No results found for: \"CHOL\"  No results found for: \"HDL\"  No results found for: \"LDL\"  No results found for: \"TRIG\"  No components found for: \"CHOLHDL\"  No results found for: \"PTT\"  No components found for: \"PT/INR\"  No results found for: \"HGBA1C\"   No results found for: \"TSH\"     Echo EF Estimated  )No results found for: \"ECHOEFEST\"      Assessment/ Plan    Active Hospital Problems    Diagnosis  POA    **Acute hypoxemic respiratory failure [J96.01]  Yes     Priority: Low     HFpEF  Cxr 3/9/2024 FINDINGS: The examination is somewhat limited by the patient's marked obesity. The lungs are well expanded and there is a combination of cardiomegaly and moderate vascular congestion as well as scattered areas  of pneumonia, particularly in the lower left lung. This is similar to the study performed 2 days ago. There appears to be some worsening atelectasis or pneumonia at the right base  2d echo 3/7/2024 Interpretation Summary     Left ventricular systolic function is hyperdynamic (EF > 70%). Calculated left ventricular EF = 78.8% Normal left ventricular cavity size noted. Left ventricular wall thickness is consistent with severe concentric hypertrophy. All left ventricular wall segments contract normally. Left ventricular diastolic function was normal.    The right ventricular cavity is mildly dilated. Normal right ventricular systolic function noted.    The left atrial cavity is moderately dilated. The right atrial cavity is moderately dilated.    Trace tricuspid valve regurgitation is present. Insufficient TR velocity profile to estimate the right ventricular systolic pressure.    Borderline dilation of the ascending aorta is present. Aortic arch = 3.7 cm  Echo summary from 12/7/2021  Technically difficult study with limited views  Left ventricle size is " normal  Moderate concentric left ventricular hypertrophy  Grossly normal left ventricular contractility  Ejection fraction is visually estimated at 55 to 60%  Moderately dilated left atrium  Mildly enlarged right ventricular cavity  Structurally normal aortic valve  Structurally normal mitral valve  Small to moderate sized circumferential pericardial effusion     2. Acute hypoxemic and hypercapnic respiratory failure with respiratory acidosis/PNA    3. Metabolic encephalopathy     4. COPD  Cont tob/marijuana use    5. HTN    PLan  On iv abx for na, no gross fl vol overload      Rhoda Colindres, APRN  03/11/24  09:09 EDT    Discussed with dr Xavier  Time: 55 mins    Patient seen and examined  I reviewed Rhoda Colindres's consult and agree with their assessment and orders and accept the inherent risks.  Patient on IV antibiotics

## 2024-03-11 NOTE — PLAN OF CARE
Goal Outcome Evaluation:           Progress: improving  Outcome Evaluation: Pt agreeable to PT session this treatment session. Pt performed LE exercises while sitting edge of chair statically with supervision. Pt then required SBA for STS transfer and during ambulation. Pt was able to ambulate around 60 ft with forward and backward walking. Pt was encouraged to take a standing rest break due to drop in O2 to 86%; O2 immediatley recovered after utilizing PLB technique. Pt then performed standing MIPs x 4 with SBA, no LOB or veering noted. Pt was mildly impulsive throughout session, insisiting that he must take heart leads off. Nsg aware and notified. Pt vitals WNL. Pt left in chair upon end of session with call light in reach.      Anticipated Discharge Disposition (PT): home

## 2024-03-12 ENCOUNTER — APPOINTMENT (OUTPATIENT)
Dept: GENERAL RADIOLOGY | Facility: HOSPITAL | Age: 49
End: 2024-03-12
Payer: COMMERCIAL

## 2024-03-12 LAB
BACTERIA SPEC AEROBE CULT: NORMAL
BACTERIA SPEC AEROBE CULT: NORMAL

## 2024-03-12 PROCEDURE — 25010000002 ENOXAPARIN PER 10 MG: Performed by: INTERNAL MEDICINE

## 2024-03-12 PROCEDURE — 94660 CPAP INITIATION&MGMT: CPT

## 2024-03-12 PROCEDURE — 25010000002 METHYLPREDNISOLONE PER 40 MG: Performed by: INTERNAL MEDICINE

## 2024-03-12 PROCEDURE — 94760 N-INVAS EAR/PLS OXIMETRY 1: CPT

## 2024-03-12 PROCEDURE — 25010000002 FUROSEMIDE PER 20 MG: Performed by: INTERNAL MEDICINE

## 2024-03-12 PROCEDURE — 94799 UNLISTED PULMONARY SVC/PX: CPT

## 2024-03-12 PROCEDURE — 25010000002 CEFTRIAXONE PER 250 MG: Performed by: INTERNAL MEDICINE

## 2024-03-12 PROCEDURE — 94761 N-INVAS EAR/PLS OXIMETRY MLT: CPT

## 2024-03-12 PROCEDURE — 94664 DEMO&/EVAL PT USE INHALER: CPT

## 2024-03-12 PROCEDURE — 71045 X-RAY EXAM CHEST 1 VIEW: CPT

## 2024-03-12 PROCEDURE — 25810000003 SODIUM CHLORIDE 0.9 % SOLUTION 250 ML FLEX CONT: Performed by: INTERNAL MEDICINE

## 2024-03-12 PROCEDURE — 25010000002 AZITHROMYCIN PER 500 MG: Performed by: INTERNAL MEDICINE

## 2024-03-12 RX ADMIN — METHYLPREDNISOLONE SODIUM SUCCINATE 40 MG: 40 INJECTION, POWDER, FOR SOLUTION INTRAMUSCULAR; INTRAVENOUS at 14:50

## 2024-03-12 RX ADMIN — CEFTRIAXONE 2000 MG: 2 INJECTION, POWDER, FOR SOLUTION INTRAMUSCULAR; INTRAVENOUS at 14:50

## 2024-03-12 RX ADMIN — IPRATROPIUM BROMIDE AND ALBUTEROL SULFATE 3 ML: 2.5; .5 SOLUTION RESPIRATORY (INHALATION) at 06:44

## 2024-03-12 RX ADMIN — FUROSEMIDE 40 MG: 10 INJECTION, SOLUTION INTRAMUSCULAR; INTRAVENOUS at 17:00

## 2024-03-12 RX ADMIN — ENOXAPARIN SODIUM 40 MG: 100 INJECTION SUBCUTANEOUS at 20:57

## 2024-03-12 RX ADMIN — IPRATROPIUM BROMIDE AND ALBUTEROL SULFATE 3 ML: 2.5; .5 SOLUTION RESPIRATORY (INHALATION) at 19:34

## 2024-03-12 RX ADMIN — IPRATROPIUM BROMIDE AND ALBUTEROL SULFATE 3 ML: 2.5; .5 SOLUTION RESPIRATORY (INHALATION) at 03:45

## 2024-03-12 RX ADMIN — FUROSEMIDE 40 MG: 10 INJECTION, SOLUTION INTRAMUSCULAR; INTRAVENOUS at 09:40

## 2024-03-12 RX ADMIN — ENOXAPARIN SODIUM 40 MG: 100 INJECTION SUBCUTANEOUS at 09:40

## 2024-03-12 RX ADMIN — IPRATROPIUM BROMIDE AND ALBUTEROL SULFATE 3 ML: 2.5; .5 SOLUTION RESPIRATORY (INHALATION) at 10:15

## 2024-03-12 RX ADMIN — LISINOPRIL 10 MG: 10 TABLET ORAL at 09:40

## 2024-03-12 RX ADMIN — AZITHROMYCIN MONOHYDRATE 500 MG: 500 INJECTION, POWDER, LYOPHILIZED, FOR SOLUTION INTRAVENOUS at 16:54

## 2024-03-12 RX ADMIN — METHYLPREDNISOLONE SODIUM SUCCINATE 40 MG: 40 INJECTION, POWDER, FOR SOLUTION INTRAMUSCULAR; INTRAVENOUS at 05:13

## 2024-03-12 RX ADMIN — IPRATROPIUM BROMIDE AND ALBUTEROL SULFATE 3 ML: 2.5; .5 SOLUTION RESPIRATORY (INHALATION) at 22:36

## 2024-03-12 RX ADMIN — IPRATROPIUM BROMIDE AND ALBUTEROL SULFATE 3 ML: 2.5; .5 SOLUTION RESPIRATORY (INHALATION) at 15:19

## 2024-03-12 NOTE — PROGRESS NOTES
[facility]     Mar/chart reviewed  Follow up acute hypoxemic respiratory failure with +HMV and   Acute CHF  No chest pain, better able to tolerate being off nippv, oxygen requirement improving    Vital Sign Min/Max for last 24 hours  Temp  Min: 97.7 °F (36.5 °C)  Max: 98.8 °F (37.1 °C)   BP  Min: 116/66  Max: 156/86   Pulse  Min: 52  Max: 95   Resp  Min: 18  Max: 22   SpO2  Min: 90 %  Max: 98 %   Flow (L/min)  Min: 9  Max: 12   Weight  Min: 159 kg (351 lb 4.8 oz)  Max: 159 kg (351 lb 4.8 oz)   Wt down 10lbs since admit  1060/3750    Appears ill axox3,   perrl, eomi, normal sclera  mmm, no jvd, trachea midline, neck supple,  chest decreased ae bilaterally, no crackles, no wheezes, , ae is slightly better again today  rrr,   soft, nt, nd +bs,  no c/c/ 1+ edema  Skin warm, dry no rashes      Labs: 3/12: reviewed:  Nothing new       A/P:  Acute Hypoxemic and hypercapnic respiratory failure -- appears he has been on nippv pretty much continuously since admission. His hyyerpcapnia is compensated at this point, would be good to see if he can take some breaks suspect some chronic hypoxemia and hypercapnia. Should be on some form of pap at home on d/c., oxygen sats goal 88=92 off nippv  Acute on chronic diastolic chf -- diuresis - echo pretty normal looking. Continue on lasix  Penumonia -- continue antibiotic  COPD with ae -- on steroids, bronchodilators  Encephalopathy --  awake approprate right now  Tobacco and marijuana abuse  Morbid obesity -- suspect restrictive lung disease/OHV/SHARMILA  HTN --  po meds now with better control    Slow improvement  Continue current  Repeat cxr today    Hopefully home 1-2 days

## 2024-03-12 NOTE — PLAN OF CARE
Goal Outcome Evaluation:  Plan of Care Reviewed With: patient        Progress: improving  Outcome Evaluation: VSS, sr, a/o x4. Down to 7L hfnc. IV lasix. IV abx. IV steroids. CXR shows partial improvement.

## 2024-03-12 NOTE — PAYOR COMM NOTE
"Josh Hinojosa (48 y.o. Male)                           ATTENTION; CONTINUED CLINICALS CASE REF [id number]                   REPLY TO [facility] DEPT  [phone] OR CALL            Date of Birth   1975    Social Security Number       Address   [address] [place] [id number]    Home Phone   [phone]    MRN   [id number]       Protestant   Mandaeism    Marital Status   Single                            Admission Date   3/7/24    Admission Type   Emergency    Admitting Provider   Emery Hauser MD    Attending Provider   Dougie Lang MD    Department, Room/Bed   [address], S410/1       Discharge Date       Discharge Disposition       Discharge Destination                                 Attending Provider: Dougie Lang MD    Allergies: No Known Allergies    Isolation: Contact   Infection: Human Metapneumovirus  (03/07/24)   Code Status: CPR    Ht: 180.3 cm (71\")   Wt: 160 kg (353 lb 13.4 oz)    Admission Cmt: None   Principal Problem: Acute hypoxemic respiratory failure [J96.01]                   Active Insurance as of 3/7/2024       Primary Coverage       Payor Plan Insurance Group Employer/Plan Group    [facility] [employer]        Payor Plan Address Payor Plan Phone Number Payor Plan Fax Number Effective Dates    [address] [address]   3/7/2024 - None Entered    [facility] [id number]         Subscriber Name Subscriber Birth Date Member ID       JOSH HINOJOSA 1975 [id number]                     Emergency Contacts        (Rel.) Home Phone Work Phone Mobile Phone    elizabeth washington (Sister) -- -- [phone]              Vital Signs (last day)       Date/Time Temp Temp src Pulse Resp BP Patient Position SpO2    03/11/24 1929 -- -- 88 20 -- -- --    03/11/24 1923 -- -- 87 20 -- -- 94    03/11/24 1618 98.1 (36.7) Oral 52 20 129/77 Sitting 94    03/11/24 1428 -- -- 90 -- 118/80 -- --    03/11/24 1423 -- -- 95 -- " 138/75 -- --    03/11/24 1214 -- -- 79 -- 126/69 -- --    03/11/24 1200 -- -- 79 -- 156/86 -- --    03/11/24 1142 97.7 (36.5) Oral -- 22 -- -- --    03/11/24 1100 -- -- 83 -- 146/81 -- 90    03/11/24 1041 -- -- 89 24 -- -- 92    03/11/24 1000 -- -- 90 -- 144/76 -- 91    03/11/24 0900 -- -- 77 -- 146/86 -- 93    03/11/24 0817 -- -- 73 20 -- -- 93    03/11/24 0800 -- -- 73 -- 166/90 -- 90    03/11/24 0700 -- -- 71 -- 174/87 -- 91    03/11/24 0613 -- -- -- -- 124/84 -- --    03/11/24 0600 -- -- 72 -- 146/122 -- 91    03/11/24 0500 -- -- 76 -- -- -- 93    03/11/24 0400 -- -- 62 -- 186/79 -- 96    03/11/24 0305 -- -- 65 28 160/113 -- 97    03/11/24 0301 -- -- 64 28 -- -- 97    03/11/24 0300 -- -- 73 -- -- -- 95    03/11/24 0200 -- -- 66 -- 169/116 -- 97    03/11/24 0100 -- -- 72 -- 151/90 -- 97    03/11/24 0000 -- -- 69 -- 134/60 -- 96    03/10/24 2330 -- -- 71 28 -- -- 96    03/10/24 2327 -- -- 72 28 -- -- 97    03/10/24 2300 -- -- 74 -- 147/88 -- 98    03/10/24 2200 -- -- 71 -- 155/97 -- 97    03/10/24 2100 -- -- 75 -- 150/90 -- 96    03/10/24 2015 -- -- 86 28 -- -- 93    03/10/24 2012 -- -- 85 28 -- -- 95    03/10/24 2000 -- -- 91 -- 143/94 -- 91    03/10/24 1900 -- -- 86 -- 128/75 -- 90    03/10/24 1800 97.3 (36.3) Oral 102 -- 143/84 -- 91    03/10/24 1730 -- -- 86 -- -- -- 91    03/10/24 1707 -- -- 84 -- 149/83 -- 88    03/10/24 1700 -- -- 85 -- -- -- 89    03/10/24 1630 -- -- 86 -- -- -- 86    03/10/24 1600 98.1 (36.7) Oral 80 -- 134/121 -- 89    03/10/24 1530 -- -- 79 -- -- -- 90    03/10/24 1500 -- -- 76 -- 166/94 -- 93    03/10/24 1456 -- -- 78 22 -- -- 93    03/10/24 1430 -- -- 79 -- -- -- 89    03/10/24 1400 -- -- 84 -- 144/82 -- 90    03/10/24 1330 -- -- 95 -- -- -- 89    03/10/24 1300 -- -- 81 -- 142/77 -- 87    03/10/24 1230 -- -- 79 -- -- -- 86    03/10/24 1219 -- -- 79 25 -- -- 91    03/10/24 1200 -- -- 79 -- 133/75 -- 90    03/10/24 1130 -- -- 76 -- -- -- 91    03/10/24 1104 98.3 (36.8) Oral -- -- -- --  --    03/10/24 1101 -- -- 80 -- 142/130 -- 95    03/10/24 1027 -- -- 77 -- 183/76 -- 94    03/10/24 1005 -- -- 80 -- 188/95 -- 89    03/10/24 1000 -- -- 79 -- -- -- 90    03/10/24 0930 -- -- 80 -- -- -- 87    03/10/24 0900 -- -- 75 -- 142/70 -- 88    03/10/24 0830 -- -- 73 -- -- -- 91    03/10/24 0800 -- -- 74 -- 143/85 -- 96    03/10/24 0701 -- -- 65 25 165/135 -- 95    03/10/24 0600 -- -- 66 -- 171/110 -- 95    03/10/24 0500 -- -- 66 -- 172/103 -- 94    03/10/24 0437 -- -- 68 28 -- -- 93    03/10/24 0434 -- -- 69 28 -- -- 95    03/10/24 0400 98 (36.7) -- 71 -- 168/90 -- 93    03/10/24 0100 -- -- 74 -- 155/96 -- 92    03/10/24 0000 -- -- 74 -- 154/96 -- 95          Oxygen Therapy (last day)       Date/Time SpO2 Device (Oxygen Therapy) Flow (L/min) Oxygen Concentration (%) ETCO2 (mmHg)    03/11/24 1923 94 high-flow nasal cannula;humidified 12 -- --    03/11/24 1626 -- high-flow nasal cannula 12 -- --    03/11/24 1618 94 high-flow nasal cannula 12 -- --    03/11/24 1400 -- humidified;high-flow nasal cannula 12 -- --    03/11/24 1100 90 -- -- -- --    03/11/24 1041 92 humidified;high-flow nasal cannula 12 -- --    03/11/24 1000 91 -- -- -- --    03/11/24 0900 93 -- -- -- --    03/11/24 0817 93 high-flow nasal cannula;humidified 10 -- --    03/11/24 0800 90 humidified;high-flow nasal cannula 10 -- --    03/11/24 0700 91 -- -- -- --    03/11/24 0600 91 -- -- -- --    03/11/24 0505 -- high-flow nasal cannula 10 -- --    03/11/24 0500 93 -- -- -- --    03/11/24 0404 -- NPPV/NIV -- -- --    03/11/24 0400 96 -- -- -- --    03/11/24 0305 97 -- -- -- --    03/11/24 0301 97 NPPV/NIV -- 50 --    03/11/24 0300 95 -- -- -- --    03/11/24 0200 97 -- -- -- --    03/11/24 0100 97 -- -- -- --    03/11/24 0011 -- NPPV/NIV -- -- --    03/11/24 0000 96 -- -- -- --    03/10/24 2330 96 -- -- -- --    03/10/24 2327 97 NPPV/NIV -- 60 --    03/10/24 2300 98 -- -- -- --    03/10/24 2200 97 -- -- -- --    03/10/24 2100 96 -- -- -- --     03/10/24 2015 93 -- -- -- --    03/10/24 2012 95 NPPV/NIV -- 65 --    03/10/24 2000 91 -- -- -- --    03/10/24 1900 90 -- -- -- --    03/10/24 1800 91 -- -- -- --    03/10/24 1730 91 -- -- -- --    03/10/24 1707 88 -- -- -- --    03/10/24 1700 89 -- -- -- --    03/10/24 1630 86 -- -- -- --    03/10/24 1600 89 high-flow nasal cannula 8 -- --    03/10/24 1530 90 -- -- -- --    03/10/24 1500 93 -- -- -- --    03/10/24 1456 93 high-flow nasal cannula;humidified 8 -- --    03/10/24 1430 89 -- -- -- --    03/10/24 1400 90 -- -- -- --    03/10/24 1330 89 -- -- -- --    03/10/24 1300 87 -- -- -- --    03/10/24 1230 86 -- -- -- --    03/10/24 1219 91 high-flow nasal cannula;humidified;nasal cannula 12 -- --    03/10/24 1200 90 high-flow nasal cannula 12 -- --    03/10/24 1130 91 -- -- -- --    03/10/24 1101 95 -- -- -- --    03/10/24 1027 94 -- -- -- --    03/10/24 1005 89 -- -- -- --    03/10/24 1000 90 -- -- -- --    03/10/24 0930 87 -- -- -- --    03/10/24 0900 88 -- -- -- --    03/10/24 0830 91 -- -- -- --    03/10/24 0800 96 high-flow nasal cannula 12 -- --    03/10/24 0715 -- -- -- 65 --    03/10/24 0701 95 nasal cannula 8 -- --    03/10/24 0600 95 -- -- -- --    03/10/24 0500 94 -- -- -- --    03/10/24 0437 93 -- -- -- --    03/10/24 0434 95 NPPV/NIV -- 65 --    03/10/24 0400 93 -- -- -- --    03/10/24 0100 92 -- -- -- --    03/10/24 0000 95 -- -- -- --          Lines, Drains & Airways       Active LDAs       Name Placement date Placement time Site Days    Peripheral IV 03/07/24 1438 Anterior;Distal;Left;Upper Arm 03/07/24  1438  Arm  4                  Orders (last 24 hrs)        Start     Ordered    03/12/24 1500  cefTRIAXone (ROCEPHIN) 2,000 mg in sodium chloride 0.9 % 100 mL MBP  Every 24 Hours         03/11/24 1631    03/11/24 1153  POC Glucose Once  PROCEDURE ONCE        Comments: Complete no more than 45 minutes prior to patient eating      03/11/24 1142    03/11/24 1142  Transfer Patient  Once         03/11/24  "1141    03/11/24 0515  hydrALAZINE (APRESOLINE) injection 10 mg  Once         03/11/24 0422    03/11/24 0500  Basic Metabolic Panel  Morning Draw         03/11/24 0340    03/10/24 1430  Oscillating Positive Expiratory Pressure (OPEP)  3 Times Daily - RT       03/10/24 1216    03/10/24 1300  lisinopril (PRINIVIL,ZESTRIL) tablet 10 mg  Every 24 Hours Scheduled         03/10/24 1208    03/10/24 1030  enalaprilat (VASOTEC) injection 1.25 mg  Every 6 Hours PRN         03/10/24 1030    03/09/24 2200  POC Glucose 4x Daily Before Meals & at Bedtime  4 Times Daily Before Meals & at Bedtime,   Status:  Canceled       03/09/24 1946    03/09/24 1800  furosemide (LASIX) injection 40 mg  2 Times Daily (Diuretics)         03/09/24 1507    03/09/24 1658  labetalol (NORMODYNE,TRANDATE) injection 10 mg  Every 4 Hours PRN         03/09/24 1700    03/09/24 1600  azithromycin (ZITHROMAX) 500 mg in sodium chloride 0.9 % 250 mL IVPB-VTB  Every 24 Hours         03/09/24 1508    03/08/24 2100  Enoxaparin Sodium (LOVENOX) syringe 40 mg  Every 12 Hours         03/08/24 0955    03/08/24 1500  cefTRIAXone (ROCEPHIN) 2,000 mg in sodium chloride 0.9 % 100 mL IVPB-VTB  Every 24 Hours,   Status:  Discontinued         03/07/24 1452    03/07/24 2100  sennosides-docusate (PERICOLACE) 8.6-50 MG per tablet 2 tablet  2 Times Daily        Placed in \"And\" Linked Group    03/07/24 1449    03/07/24 1700  Urinary Catheter Care  Every Shift      Placed in \"And\" Linked Group    03/07/24 1700    03/07/24 1530  ipratropium-albuterol (DUO-NEB) nebulizer solution 3 mL  Every 4 Hours - RT         03/07/24 1448    03/07/24 1504  methylPREDNISolone sodium succinate (SOLU-Medrol) injection 40 mg  Every 12 Hours         03/07/24 1448    03/07/24 1450  Daily Weights  Daily       03/07/24 1449    03/07/24 1449  Potassium Replacement - Follow Nurse / BPA Driven Protocol  As Needed         03/07/24 1449    03/07/24 1449  Magnesium Standard Dose Replacement - Follow Nurse / " "BPA Driven Protocol  As Needed         03/07/24 1449    03/07/24 1449  Phosphorus Replacement - Follow Nurse / BPA Driven Protocol  As Needed         03/07/24 1449    03/07/24 1449  Calcium Replacement - Follow Nurse / BPA Driven Protocol  As Needed         03/07/24 1449    03/07/24 1449  Intake and Output  Every Shift       03/07/24 1449    03/07/24 1449  Oral Care - Patient Not on NPPV & Not Intubated  Every Shift       03/07/24 1449    03/07/24 1448  nitroglycerin (NITROSTAT) SL tablet 0.4 mg  Every 5 Minutes PRN         03/07/24 1449    03/07/24 1448  aluminum-magnesium hydroxide-simethicone (MAALOX MAX) 400-400-40 MG/5ML suspension 15 mL  Every 6 Hours PRN         03/07/24 1449    03/07/24 1448  polyethylene glycol (MIRALAX) packet 17 g  Daily PRN        Placed in \"And\" Linked Group    03/07/24 1449    03/07/24 1448  bisacodyl (DULCOLAX) EC tablet 5 mg  Daily PRN        Placed in \"And\" Linked Group    03/07/24 1449    03/07/24 1448  bisacodyl (DULCOLAX) suppository 10 mg  Daily PRN        Placed in \"And\" Linked Group    03/07/24 1449    03/07/24 1448  acetaminophen (TYLENOL) tablet 650 mg  Every 4 Hours PRN        Placed in \"Or\" Linked Group    03/07/24 1449    03/07/24 1448  acetaminophen (TYLENOL) suppository 650 mg  Every 4 Hours PRN        Placed in \"Or\" Linked Group    03/07/24 1449    03/07/24 1448  ondansetron ODT (ZOFRAN-ODT) disintegrating tablet 4 mg  Every 6 Hours PRN        Placed in \"Or\" Linked Group    03/07/24 1449    03/07/24 1448  ondansetron (ZOFRAN) injection 4 mg  Every 6 Hours PRN        Placed in \"Or\" Linked Group    03/07/24 1449    03/07/24 1448  ipratropium-albuterol (DUO-NEB) nebulizer solution 3 mL  Every 2 Hours PRN         03/07/24 1448    03/07/24 1408  sodium chloride 0.9 % flush 10 mL  As Needed        Placed in \"And\" Linked Group    03/07/24 1408    Unscheduled  Oxygen Therapy- Nasal Cannula; Titrate 1-6 LPM Per SpO2; 88 - 92%  Continuous PRN       03/09/24 1509              " "       Physician Progress Notes (last 24 hours)        Dougie Lang MD at 03/11/24 1134          [facility]    Mar/chart reviewed  Follow up acute hypoxemic respiratory failure with +HMV and   Acute CHF  No chest pain, better able to tolerate being off nippv, oxygen requirement improving    Vital Sign Min/Max for last 24 hours  Temp  Min: 97.3 °F (36.3 °C)  Max: 98.1 °F (36.7 °C)   BP  Min: 124/84  Max: 186/79   Pulse  Min: 62  Max: 102   Resp  Min: 20  Max: 28   SpO2  Min: 86 %  Max: 98 %   Flow (L/min)  Min: 8  Max: 12   Weight  Min: 160 kg (353 lb 13.4 oz)  Max: 160 kg (353 lb 13.4 oz)   450/3425    Appears ill axox3,   perrl, eomi, normal sclera  mmm, no jvd, trachea midline, neck supple,  chest decreased ae bilaterally, + crackles, + wheezes, more on the right lower lobe, ae is slightly better today  rrr,   soft, nt, nd +bs,  no c/c/ 1+ edema  Skin warm, dry no rashes    Labs: 3/11: reviewed:  Glucose 117  Bun 22  Cr 0.65  Bicarb 32    Cardiologies plan is \" antibiotics for na, no gross fl vol overload\" (?)    A/P:  Acute Hypoxemic and hypercapnic respiratory failure -- appears he has been on nippv pretty much continuously since admission. His hyyerpcapnia is compensated at this point, would be good to see if he can take some breaks suspect some chronic hypoxemia and hypercapnia. Should be on some form of pap at home on d/c., oxygen sats goal 88=92 off nippv  Acute on chronic diastolic chf -- diuresis - echo pretty normal looking. Continue on lasix  [place] -- continue antibiotic  COPD with ae -- on steroids, bronchodilators  Encephalopathy --  awake approprate right now  Tobacco and marijuana abuse  Morbid obesity -- suspect restrictive lung disease/OHV/SHARMILA  HTN --  po meds now with better control    Seems to be improving, will continue the iv lasix for today; unless cardiology feels otherwise.  Will hopefully be able to go to po steroids tomorrow.    Can transfer out of unit " today    Electronically signed by Dougie Lang MD at 03/11/24 1141          Consult Notes (last 24 hours)        Puneet Xavier MD at 03/11/24 0908        Consult Orders    1. Inpatient Cardiology Consult [[id number]] ordered by Dougie Lang MD at 03/10/24 1203                 Josh Hinojosa   48 y.o.  male    LOS: 4 days   Patient Care Team:  Provider, No Known as PCP - General      Subjective     Chief Complaint: soa    History of Present Illness: Mr spring is a 48-year-old male who follows with Dr Alberto who presented with altered mental status and shortness of breath. Has a history of cardiomyopathy and continues to smoke cigarettes and marijuana on a regular basis. He states hasn't been feeling well the last few weeks with fatigue and weakness then he developed soa and prod cough. No chest pains no edema. No weight gain    PMH  Essential HTN  HLD  HFpEF EF estimated to be 55 to 60% 2021.   COPD  GENARO    Echo summary from 12/7/2021  Technically difficult study with limited views  Left ventricle size is normal  Moderate concentric left ventricular hypertrophy  Grossly normal left ventricular contractility  Ejection fraction is visually estimated at 55 to 60%  Moderately dilated left atrium  Mildly enlarged right ventricular cavity  Structurally normal aortic valve  Structurally normal mitral valve  Small to moderate sized circumferential pericardial effusion     History reviewed. No pertinent past medical history.  History reviewed. No pertinent surgical history.  No medications prior to admission.       History reviewed. No pertinent family history.  Social History     Socioeconomic History    Marital status: Single   Tobacco Use    Smoking status: Every Day     Current packs/day: 1.00     Types: Cigarettes   Vaping Use    Vaping status: Never Used   Substance and Sexual Activity    Alcohol use: Never    Drug use: Yes     Types: Marijuana    Sexual activity: Defer     Objective       Review of  Systems:   Constitutional: Negative for diaphoresis, fatigue, fever and unexpected weight change.   HENT: Negative.    Eyes: Negative.    Respiratory: Negative for cough, shortness of breath and wheezing.    Cardiovascular: Negative for chest pain, palpitations and leg swelling.   Gastrointestinal: Negative for abdominal pain, blood in stool, constipation, diarrhea, nausea and vomiting.   Endocrine: Negative.    Genitourinary: Negative for difficulty urinating, dysuria and frequency.   Musculoskeletal: Negative.    Skin: Negative.    Allergic/Immunologic: Negative for environmental allergies and food allergies.   Neurological: Negative.    Hematological: Negative.    Psychiatric/Behavioral: Negative.        Current Facility-Administered Medications:     acetaminophen (TYLENOL) tablet 650 mg, 650 mg, Oral, Q4H PRN, 650 mg at 03/09/24 0631 **OR** acetaminophen (TYLENOL) suppository 650 mg, 650 mg, Rectal, Q4H PRN, Emery Hauser MD    aluminum-magnesium hydroxide-simethicone (MAALOX MAX) 400-400-40 MG/5ML suspension 15 mL, 15 mL, Oral, Q6H PRN, Emery Hauser MD    azithromycin (ZITHROMAX) 500 mg in sodium chloride 0.9 % 250 mL IVPB-VTB, 500 mg, Intravenous, Q24H, Dougie Lang MD, 500 mg at 03/10/24 1617    sennosides-docusate (PERICOLACE) 8.6-50 MG per tablet 2 tablet, 2 tablet, Oral, BID, 2 tablet at 03/10/24 0800 **AND** polyethylene glycol (MIRALAX) packet 17 g, 17 g, Oral, Daily PRN **AND** bisacodyl (DULCOLAX) EC tablet 5 mg, 5 mg, Oral, Daily PRN **AND** bisacodyl (DULCOLAX) suppository 10 mg, 10 mg, Rectal, Daily PRN, Emery Hauser MD    Calcium Replacement - Follow Nurse / BPA Driven Protocol, , Does not apply, PRN, Emery Hauser MD    cefTRIAXone (ROCEPHIN) 2,000 mg in sodium chloride 0.9 % 100 mL IVPB-VTB, 2,000 mg, Intravenous, Q24H, Emery Hauser MD, Last Rate: 200 mL/hr at 03/10/24 1400, 2,000 mg at 03/10/24 1400    enalaprilat (VASOTEC) injection 1.25 mg, 1.25 mg,  Intravenous, Q6H PRN, Dougie Lang MD    Enoxaparin Sodium (LOVENOX) syringe 40 mg, 40 mg, Subcutaneous, Q12H, Emery Hauser MD, 40 mg at 03/11/24 0908    furosemide (LASIX) injection 40 mg, 40 mg, Intravenous, BID, Dougie Lang MD, 40 mg at 03/11/24 0908    ipratropium-albuterol (DUO-NEB) nebulizer solution 3 mL, 3 mL, Nebulization, Q4H - RT, Emery Hauser MD, 3 mL at 03/11/24 0817    ipratropium-albuterol (DUO-NEB) nebulizer solution 3 mL, 3 mL, Nebulization, Q2H PRN, Emery Hauser MD    labetalol (NORMODYNE,TRANDATE) injection 10 mg, 10 mg, Intravenous, Q4H PRN, Laura Singh MD, 10 mg at 03/10/24 0759    lisinopril (PRINIVIL,ZESTRIL) tablet 10 mg, 10 mg, Oral, Q24H, Dougie Lang MD, 10 mg at 03/11/24 0908    Magnesium Standard Dose Replacement - Follow Nurse / BPA Driven Protocol, , Does not apply, PRN, Emery Hauser MD    methylPREDNISolone sodium succinate (SOLU-Medrol) injection 40 mg, 40 mg, Intravenous, Q12H, Emery Hauser MD, 40 mg at 03/11/24 0300    nitroglycerin (NITROSTAT) SL tablet 0.4 mg, 0.4 mg, Sublingual, Q5 Min PRN, Emery Hauser MD    ondansetron ODT (ZOFRAN-ODT) disintegrating tablet 4 mg, 4 mg, Oral, Q6H PRN **OR** ondansetron (ZOFRAN) injection 4 mg, 4 mg, Intravenous, Q6H PRN, Emery Hauser MD    Phosphorus Replacement - Follow Nurse / BPA Driven Protocol, , Does not apply, PRNMurtaza Mark Edwin, MD    Potassium Replacement - Follow Nurse / BPA Driven Protocol, , Does not apply, PRMurtaza ANDRADE Mark Edwin, MD    [COMPLETED] Insert Peripheral IV, , , Once **AND** sodium chloride 0.9 % flush 10 mL, 10 mL, Intravenous, PRN, Deny Richey MD      Physical Exam:   Vital Sign Min/Max for last 24 hours  Temp  Min: 97.3 °F (36.3 °C)  Max: 98.3 °F (36.8 °C)   BP  Min: 124/84  Max: 188/95    Pulse  Min: 62  Max: 102     Wt Readings from Last 3 Encounters:   03/11/24 (!) 160 kg (353 lb 13.4 oz)       General Appearance:  Awake,  Alert,  "cooperative, obese guero siting up in chair in no acute distress   Head:  Normocephalic, without obvious abnormality, atraumatic   Eyes:          Conjunctivae normal, anicteric, eom intact    Neck: No adenopathy, supple, trachea midline, no thyromegaly, no   carotid bruit, no JVD, no elevated cvp   Lungs:   Wheezing hina respirations regular, even and                  unlabored    Heart:  Regular rhythm and normal rate, normal S1 and S2,            No murmur, no gallop, no rub, no click    Chest Wall:  No abnormalities observed   Abdomen:   Normal bowel sounds, no masses, soft nontender, nondistended                    Rectal:   Deferred   Extremities: No edema. Moves all extremities well, no cyanosis, no erythema   Pulses: Pulses palpable and equal bilaterally   Skin: No bleeding, bruising or rash   Neurologic: Speech clear and appropriate, no facial drooping     : Fc yellow uo      MONITOR: nsr    Results Review:     Sodium Sodium   Date Value Ref Range Status   03/11/2024 139 136 - 145 mmol/L Final   03/10/2024 135 (L) 136 - 145 mmol/L Final   03/09/2024 136 136 - 145 mmol/L Final      Potassium Potassium   Date Value Ref Range Status   03/11/2024 4.2 3.5 - 5.2 mmol/L Final   03/10/2024 4.3 3.5 - 5.2 mmol/L Final   03/09/2024 4.3 3.5 - 5.2 mmol/L Final     Comment:     Slight hemolysis detected by analyzer. Result may be falsely elevated.      Chloride Chloride   Date Value Ref Range Status   03/11/2024 97 (L) 98 - 107 mmol/L Final   03/10/2024 96 (L) 98 - 107 mmol/L Final   03/09/2024 92 (L) 98 - 107 mmol/L Final      Bicarbonate No results found for: \"PLASMABICARB\"   BUN BUN   Date Value Ref Range Status   03/11/2024 22 (H) 6 - 20 mg/dL Final   03/10/2024 29 (H) 6 - 20 mg/dL Final   03/09/2024 25 (H) 6 - 20 mg/dL Final      Creatinine Creatinine   Date Value Ref Range Status   03/11/2024 0.65 (L) 0.76 - 1.27 mg/dL Final   03/10/2024 0.76 0.76 - 1.27 mg/dL Final   03/09/2024 1.01 0.76 - 1.27 mg/dL Final    " "  Calcium Calcium   Date Value Ref Range Status   03/11/2024 8.6 8.6 - 10.5 mg/dL Final   03/10/2024 8.8 8.6 - 10.5 mg/dL Final   03/09/2024 8.6 8.6 - 10.5 mg/dL Final      Magnesium No results found for: \"MG\"     Results from last 7 days   Lab Units 03/10/24  0419   WBC 10*3/mm3 5.30   HEMOGLOBIN g/dL 16.2   HEMATOCRIT % 50.2   PLATELETS 10*3/mm3 172     Lab Results   Lab Value Date/Time    TROPONINT 44 (H) 03/07/2024 1742    TROPONINT 38 (H) 03/07/2024 1410     No results found for: \"CHOL\"  No results found for: \"HDL\"  No results found for: \"LDL\"  No results found for: \"TRIG\"  No components found for: \"CHOLHDL\"  No results found for: \"PTT\"  No components found for: \"PT/INR\"  No results found for: \"HGBA1C\"   No results found for: \"TSH\"     Echo EF Estimated  )No results found for: \"ECHOEFEST\"      Assessment/ Plan    Active Hospital Problems    Diagnosis  POA    **Acute hypoxemic respiratory failure [J96.01]  Yes     Priority: Low     HFpEF  Cxr 3/9/2024 FINDINGS: The examination is somewhat limited by the patient's marked obesity. The lungs are well expanded and there is a combination of cardiomegaly and moderate vascular congestion as well as scattered areas  of pneumonia, particularly in the lower left lung. This is similar to the study performed 2 days ago. There appears to be some worsening atelectasis or pneumonia at the right base  2d echo 3/7/2024 Interpretation Summary     Left ventricular systolic function is hyperdynamic (EF > 70%). Calculated left ventricular EF = 78.8% Normal left ventricular cavity size noted. Left ventricular wall thickness is consistent with severe concentric hypertrophy. All left ventricular wall segments contract normally. Left ventricular diastolic function was normal.    The right ventricular cavity is mildly dilated. Normal right ventricular systolic function noted.    The left atrial cavity is moderately dilated. The right atrial cavity is moderately dilated.    Trace " tricuspid valve regurgitation is present. Insufficient TR velocity profile to estimate the right ventricular systolic pressure.    Borderline dilation of the ascending aorta is present. Aortic arch = 3.7 cm  Echo summary from 12/7/2021  Technically difficult study with limited views  Left ventricle size is normal  Moderate concentric left ventricular hypertrophy  Grossly normal left ventricular contractility  Ejection fraction is visually estimated at 55 to 60%  Moderately dilated left atrium  Mildly enlarged right ventricular cavity  Structurally normal aortic valve  Structurally normal mitral valve  Small to moderate sized circumferential pericardial effusion     2. Acute hypoxemic and hypercapnic respiratory failure with respiratory acidosis/PNA    3. Metabolic encephalopathy     4. COPD  Cont tob/marijuana use    5. HTN    PLan  On iv abx for na, no gross fl vol overload      Rhoda Colindres, DORIS  03/11/24  09:09 EDT    Discussed with dr Xavier  Time: 55 mins    Patient seen and examined  I reviewed Rhoda Colindres's consult and agree with their assessment and orders and accept the inherent risks.  Patient on IV antibiotics      Electronically signed by Puneet Xavier MD at 03/11/24 4801

## 2024-03-12 NOTE — PROGRESS NOTES
Josh Spring   48 y.o.  male    LOS: 5 days   Patient Care Team:  Provider, No Known as PCP - General      Subjective   Resting     Review of Systems:   Lungs: No cough, no SOA  CV: No CP, no palpitations  GI: No nausea, vomiting, or diarrhea.     Medication Review:   Current Facility-Administered Medications:     acetaminophen (TYLENOL) tablet 650 mg, 650 mg, Oral, Q4H PRN, 650 mg at 03/09/24 0631 **OR** acetaminophen (TYLENOL) suppository 650 mg, 650 mg, Rectal, Q4H PRN, Emery Hauser MD    aluminum-magnesium hydroxide-simethicone (MAALOX MAX) 400-400-40 MG/5ML suspension 15 mL, 15 mL, Oral, Q6H PRN, Emery Hauser MD    azithromycin (ZITHROMAX) 500 mg in sodium chloride 0.9 % 250 mL IVPB-VTB, 500 mg, Intravenous, Q24H, Dougie Lang MD, 500 mg at 03/11/24 1619    sennosides-docusate (PERICOLACE) 8.6-50 MG per tablet 2 tablet, 2 tablet, Oral, BID, 2 tablet at 03/10/24 0800 **AND** polyethylene glycol (MIRALAX) packet 17 g, 17 g, Oral, Daily PRN **AND** bisacodyl (DULCOLAX) EC tablet 5 mg, 5 mg, Oral, Daily PRN **AND** bisacodyl (DULCOLAX) suppository 10 mg, 10 mg, Rectal, Daily PRN, Emery Hauser MD    Calcium Replacement - Follow Nurse / BPA Driven Protocol, , Does not apply, PRN, Emery Hauser MD    cefTRIAXone (ROCEPHIN) 2,000 mg in sodium chloride 0.9 % 100 mL MBP, 2,000 mg, Intravenous, Q24H, Emery Hauser MD    enalaprilat (VASOTEC) injection 1.25 mg, 1.25 mg, Intravenous, Q6H PRN, Dougie Lang MD    Enoxaparin Sodium (LOVENOX) syringe 40 mg, 40 mg, Subcutaneous, Q12H, Emery Hauser MD, 40 mg at 03/11/24 2025    furosemide (LASIX) injection 40 mg, 40 mg, Intravenous, BID, Dougie Lang MD, 40 mg at 03/11/24 1730    ipratropium-albuterol (DUO-NEB) nebulizer solution 3 mL, 3 mL, Nebulization, Q4H - RT, Emery Hauser MD, 3 mL at 03/12/24 0644    ipratropium-albuterol (DUO-NEB) nebulizer solution 3 mL, 3 mL, Nebulization, Q2H PRN, Emery Hauser,  MD    labetalol (NORMODYNE,TRANDATE) injection 10 mg, 10 mg, Intravenous, Q4H PRN, Laura Singh MD, 10 mg at 03/10/24 0759    lisinopril (PRINIVIL,ZESTRIL) tablet 10 mg, 10 mg, Oral, Q24H, Dougie Lang MD, 10 mg at 03/11/24 0908    Magnesium Standard Dose Replacement - Follow Nurse / BPA Driven Protocol, , Does not apply, PRN, Emery Hauser MD    methylPREDNISolone sodium succinate (SOLU-Medrol) injection 40 mg, 40 mg, Intravenous, Q12H, Emery Hauser MD, 40 mg at 03/12/24 0513    nitroglycerin (NITROSTAT) SL tablet 0.4 mg, 0.4 mg, Sublingual, Q5 Min PRN, Emery Hauser MD    ondansetron ODT (ZOFRAN-ODT) disintegrating tablet 4 mg, 4 mg, Oral, Q6H PRN **OR** ondansetron (ZOFRAN) injection 4 mg, 4 mg, Intravenous, Q6H PRN, Emery Hauser MD    Phosphorus Replacement - Follow Nurse / BPA Driven Protocol, , Does not apply, Murtaza RODRIGUES Mark Edwin, MD    Potassium Replacement - Follow Nurse / BPA Driven Protocol, , Does not apply, Murtaza RODRIGUES Mark Edwin, MD    [COMPLETED] Insert Peripheral IV, , , Once **AND** sodium chloride 0.9 % flush 10 mL, 10 mL, Intravenous, PRN, Deny Richey MD      Objective     Vital Sign Min/Max for last 24 hours  Temp  Min: 97.7 °F (36.5 °C)  Max: 98.8 °F (37.1 °C)   BP  Min: 116/66  Max: 156/86    Pulse  Min: 52  Max: 95         03/10/24  0600 03/11/24  0300 03/12/24  0430   Weight: (!) 162 kg (356 lb 0.7 oz) (!) 160 kg (353 lb 13.4 oz) (!) 159 kg (351 lb 4.8 oz)        Intake/Output Summary (Last 24 hours) at 3/12/2024 0912  Last data filed at 3/12/2024 0825  Gross per 24 hour   Intake 1300 ml   Output 3750 ml   Net -2450 ml       Physical Exam:    General Appearance:    No acute distress   Lungs:     Diminished bilaterally. No wheezes, rhonchi or rales.     Heart:    Regular rate and rhythm.  Normal S1 and S2. No murmur, no gallop, rub or lift. , No JVD.   Abdomen:     Normal bowel sounds, soft, nontender, nondistended.   Extremities:   No clubbing,  "cyanosis or edema.     Skin:   Warm, dry   Neurologic:   Awake       Monitor: sr  Results Review:     I reviewed the patient's new clinical results.    Sodium   Date Value Ref Range Status   03/11/2024 139 136 - 145 mmol/L Final   03/10/2024 135 (L) 136 - 145 mmol/L Final     Potassium   Date Value Ref Range Status   03/11/2024 4.2 3.5 - 5.2 mmol/L Final   03/10/2024 4.3 3.5 - 5.2 mmol/L Final     Chloride   Date Value Ref Range Status   03/11/2024 97 (L) 98 - 107 mmol/L Final   03/10/2024 96 (L) 98 - 107 mmol/L Final     No results found for: \"PLASMABICARB\"  BUN   Date Value Ref Range Status   03/11/2024 22 (H) 6 - 20 mg/dL Final   03/10/2024 29 (H) 6 - 20 mg/dL Final     Creatinine   Date Value Ref Range Status   03/11/2024 0.65 (L) 0.76 - 1.27 mg/dL Final   03/10/2024 0.76 0.76 - 1.27 mg/dL Final     Calcium   Date Value Ref Range Status   03/11/2024 8.6 8.6 - 10.5 mg/dL Final   03/10/2024 8.8 8.6 - 10.5 mg/dL Final     No results found for: \"MG\"    Results from last 7 days   Lab Units 03/10/24  0419   WBC 10*3/mm3 5.30   HEMOGLOBIN g/dL 16.2   HEMATOCRIT % 50.2   PLATELETS 10*3/mm3 172     No results found for: \"CHOL\"  No results found for: \"HDL\"  No results found for: \"LDL\"  No results found for: \"TRIG\"  Results from last 7 days   Lab Units 03/07/24  1410   INR  1.10     Results from last 7 days   Lab Units 03/07/24  1742 03/07/24  1410   HSTROP T ng/L 44* 38*     Results from last 7 days   Lab Units 03/07/24  1410   PROBNP pg/mL 1,526.0*                 Assessment/ Plan    Chronic HFpEF  Cxr 3/9/2024 FINDINGS: The examination is somewhat limited by the patient's marked obesity. The lungs are well expanded and there is a combination of cardiomegaly and moderate vascular congestion as well as scattered areas  of pneumonia, particularly in the lower left lung. This is similar to the study performed 2 days ago. There appears to be some worsening atelectasis or pneumonia at the right base  2d echo 3/7/2024 " Interpretation Summary     Left ventricular systolic function is hyperdynamic (EF > 70%). Calculated left ventricular EF = 78.8% Normal left ventricular cavity size noted. Left ventricular wall thickness is consistent with severe concentric hypertrophy. All left ventricular wall segments contract normally. Left ventricular diastolic function was normal.    The right ventricular cavity is mildly dilated. Normal right ventricular systolic function noted.    The left atrial cavity is moderately dilated. The right atrial cavity is moderately dilated.    Trace tricuspid valve regurgitation is present. Insufficient TR velocity profile to estimate the right ventricular systolic pressure.    Borderline dilation of the ascending aorta is present. Aortic arch = 3.7 cm  Echo summary from 12/7/2021  Technically difficult study with limited views  Left ventricle size is normal  Moderate concentric left ventricular hypertrophy  Grossly normal left ventricular contractility  Ejection fraction is visually estimated at 55 to 60%  Moderately dilated left atrium  Mildly enlarged right ventricular cavity  Structurally normal aortic valve  Structurally normal mitral valve  Small to moderate sized circumferential pericardial effusion      2. Acute hypoxemic and hypercapnic respiratory failure with respiratory acidosis/PNA     3. Metabolic encephalopathy      4. COPD  Cont tob/marijuana use     5. HTN    Continue current treatment.   Will sign off if ok with dr montalvo    Time:  20 min  Patient seen and examined  I reviewed Gisela Chadwick's note and agree with the assessment and orders  CV stable  Will follow as needed

## 2024-03-13 VITALS
HEIGHT: 71 IN | BODY MASS INDEX: 44.1 KG/M2 | TEMPERATURE: 98.1 F | OXYGEN SATURATION: 90 % | SYSTOLIC BLOOD PRESSURE: 147 MMHG | RESPIRATION RATE: 18 BRPM | WEIGHT: 315 LBS | HEART RATE: 89 BPM | DIASTOLIC BLOOD PRESSURE: 82 MMHG

## 2024-03-13 LAB
ANION GAP SERPL CALCULATED.3IONS-SCNC: 8.8 MMOL/L (ref 5–15)
BASOPHILS # BLD AUTO: 0.01 10*3/MM3 (ref 0–0.2)
BASOPHILS NFR BLD AUTO: 0.1 % (ref 0–1.5)
BUN SERPL-MCNC: 23 MG/DL (ref 6–20)
BUN/CREAT SERPL: 30.7 (ref 7–25)
CALCIUM SPEC-SCNC: 8.9 MG/DL (ref 8.6–10.5)
CHLORIDE SERPL-SCNC: 98 MMOL/L (ref 98–107)
CO2 SERPL-SCNC: 32.2 MMOL/L (ref 22–29)
CREAT SERPL-MCNC: 0.75 MG/DL (ref 0.76–1.27)
DEPRECATED RDW RBC AUTO: 46.7 FL (ref 37–54)
EGFRCR SERPLBLD CKD-EPI 2021: 112 ML/MIN/1.73
EOSINOPHIL # BLD AUTO: 0.04 10*3/MM3 (ref 0–0.4)
EOSINOPHIL NFR BLD AUTO: 0.5 % (ref 0.3–6.2)
ERYTHROCYTE [DISTWIDTH] IN BLOOD BY AUTOMATED COUNT: 13.6 % (ref 12.3–15.4)
GLUCOSE SERPL-MCNC: 108 MG/DL (ref 65–99)
HCT VFR BLD AUTO: 50.4 % (ref 37.5–51)
HGB BLD-MCNC: 16.7 G/DL (ref 13–17.7)
IMM GRANULOCYTES # BLD AUTO: 0.07 10*3/MM3 (ref 0–0.05)
IMM GRANULOCYTES NFR BLD AUTO: 0.9 % (ref 0–0.5)
LYMPHOCYTES # BLD AUTO: 1.26 10*3/MM3 (ref 0.7–3.1)
LYMPHOCYTES NFR BLD AUTO: 16.3 % (ref 19.6–45.3)
MCH RBC QN AUTO: 30.5 PG (ref 26.6–33)
MCHC RBC AUTO-ENTMCNC: 33.1 G/DL (ref 31.5–35.7)
MCV RBC AUTO: 92 FL (ref 79–97)
MONOCYTES # BLD AUTO: 0.66 10*3/MM3 (ref 0.1–0.9)
MONOCYTES NFR BLD AUTO: 8.5 % (ref 5–12)
NEUTROPHILS NFR BLD AUTO: 5.71 10*3/MM3 (ref 1.7–7)
NEUTROPHILS NFR BLD AUTO: 73.7 % (ref 42.7–76)
NRBC BLD AUTO-RTO: 0 /100 WBC (ref 0–0.2)
PLATELET # BLD AUTO: 209 10*3/MM3 (ref 140–450)
PMV BLD AUTO: 9.7 FL (ref 6–12)
POTASSIUM SERPL-SCNC: 4.4 MMOL/L (ref 3.5–5.2)
RBC # BLD AUTO: 5.48 10*6/MM3 (ref 4.14–5.8)
SODIUM SERPL-SCNC: 139 MMOL/L (ref 136–145)
WBC NRBC COR # BLD AUTO: 7.75 10*3/MM3 (ref 3.4–10.8)

## 2024-03-13 PROCEDURE — 94799 UNLISTED PULMONARY SVC/PX: CPT

## 2024-03-13 PROCEDURE — 25010000002 ENOXAPARIN PER 10 MG: Performed by: INTERNAL MEDICINE

## 2024-03-13 PROCEDURE — 94660 CPAP INITIATION&MGMT: CPT

## 2024-03-13 PROCEDURE — 25010000002 FUROSEMIDE PER 20 MG: Performed by: INTERNAL MEDICINE

## 2024-03-13 PROCEDURE — 94664 DEMO&/EVAL PT USE INHALER: CPT

## 2024-03-13 PROCEDURE — 80048 BASIC METABOLIC PNL TOTAL CA: CPT | Performed by: INTERNAL MEDICINE

## 2024-03-13 PROCEDURE — 25010000002 METHYLPREDNISOLONE PER 40 MG: Performed by: INTERNAL MEDICINE

## 2024-03-13 PROCEDURE — 85025 COMPLETE CBC W/AUTO DIFF WBC: CPT | Performed by: INTERNAL MEDICINE

## 2024-03-13 PROCEDURE — 94761 N-INVAS EAR/PLS OXIMETRY MLT: CPT

## 2024-03-13 RX ORDER — LISINOPRIL 10 MG/1
10 TABLET ORAL
Qty: 30 TABLET | Refills: 0 | Status: SHIPPED | OUTPATIENT
Start: 2024-03-14

## 2024-03-13 RX ORDER — FUROSEMIDE 40 MG/1
40 TABLET ORAL DAILY
Qty: 30 TABLET | Refills: 0 | Status: SHIPPED | OUTPATIENT
Start: 2024-03-13

## 2024-03-13 RX ORDER — CEFDINIR 300 MG/1
300 CAPSULE ORAL 2 TIMES DAILY
Qty: 6 CAPSULE | Refills: 0 | Status: SHIPPED | OUTPATIENT
Start: 2024-03-13 | End: 2024-03-16

## 2024-03-13 RX ORDER — FLUTICASONE FUROATE, UMECLIDINIUM BROMIDE AND VILANTEROL TRIFENATATE 200; 62.5; 25 UG/1; UG/1; UG/1
1 POWDER RESPIRATORY (INHALATION)
Qty: 60 EACH | Refills: 0 | Status: SHIPPED | OUTPATIENT
Start: 2024-03-13

## 2024-03-13 RX ORDER — IPRATROPIUM BROMIDE AND ALBUTEROL SULFATE 2.5; .5 MG/3ML; MG/3ML
3 SOLUTION RESPIRATORY (INHALATION) EVERY 4 HOURS PRN
Qty: 360 ML | Refills: 0 | Status: SHIPPED | OUTPATIENT
Start: 2024-03-13

## 2024-03-13 RX ORDER — PREDNISONE 20 MG/1
20 TABLET ORAL 2 TIMES DAILY
Qty: 8 TABLET | Refills: 0 | Status: SHIPPED | OUTPATIENT
Start: 2024-03-13 | End: 2024-03-17

## 2024-03-13 RX ADMIN — IPRATROPIUM BROMIDE AND ALBUTEROL SULFATE 3 ML: 2.5; .5 SOLUTION RESPIRATORY (INHALATION) at 06:51

## 2024-03-13 RX ADMIN — IPRATROPIUM BROMIDE AND ALBUTEROL SULFATE 3 ML: 2.5; .5 SOLUTION RESPIRATORY (INHALATION) at 10:59

## 2024-03-13 RX ADMIN — IPRATROPIUM BROMIDE AND ALBUTEROL SULFATE 3 ML: 2.5; .5 SOLUTION RESPIRATORY (INHALATION) at 02:52

## 2024-03-13 RX ADMIN — LISINOPRIL 10 MG: 10 TABLET ORAL at 08:25

## 2024-03-13 RX ADMIN — ENOXAPARIN SODIUM 40 MG: 100 INJECTION SUBCUTANEOUS at 08:25

## 2024-03-13 RX ADMIN — METHYLPREDNISOLONE SODIUM SUCCINATE 40 MG: 40 INJECTION, POWDER, FOR SOLUTION INTRAMUSCULAR; INTRAVENOUS at 02:10

## 2024-03-13 RX ADMIN — FUROSEMIDE 40 MG: 10 INJECTION, SOLUTION INTRAMUSCULAR; INTRAVENOUS at 08:25

## 2024-03-13 NOTE — DISCHARGE PLACEMENT REQUEST
"Josh Hinojosa (48 y.o. Male)       Date of Birth   1975    Social Security Number       Address   [address] [address]    Home Phone   [phone]    MRN   [id number]       Advent   Oriental orthodox    Marital Status   Single                            Admission Date   3/7/24    Admission Type   Emergency    Admitting Provider   Emery Hauser MD    Attending Provider   Dougie Lang MD    Department, Room/Bed   [address], S[address]/1       Discharge Date       Discharge Disposition       Discharge Destination                                 Attending Provider: Dougie Lang MD    Allergies: No Known Allergies    Isolation: Contact   Infection: Human Metapneumovirus  (03/07/24)   Code Status: CPR    Ht: 180.3 cm (71\")   Wt: 159 kg (349 lb 12.8 oz)    Admission Cmt: None   Principal Problem: Acute hypoxemic respiratory failure [J96.01]                   Active Insurance as of 3/7/2024       Primary Coverage       Payor Plan Insurance Group Employer/Plan Group    [facility] BY [facility]        Payor Plan Address Payor Plan Phone Number Payor Plan Fax Number Effective Dates    [address] [address]   3/7/2024 - None Entered    [facility] [phone]         Subscriber Name Subscriber Birth Date Member ID       JOSH HINOJOSA 1975 [id number]                     Emergency Contacts        (Rel.) Home Phone Work Phone Mobile Phone    elizabeth washington (Sister) -- -- [phone]                "

## 2024-03-13 NOTE — PAYOR COMM NOTE
"Josh Hinojosa (48 y.o. Male)      PATIENT DISCHARGED 03/13/2024:  REF# [id number]      DEPT: [phone],  [phone]    [facility]: NPI [id number] [facility]# [id number]    MICA ESPANA RN,[facility]       Date of Birth   1975    Social Security Number       Address   [address] [place] [address]    Home Phone   [phone]    MRN   [id number]       Hindu   Taoism    Marital Status   Single                            Admission Date   3/7/24    Admission Type   Emergency    Admitting Provider   Emery Hauser MD    Attending Provider       Department, Room/Bed   [address], S[address]/1       Discharge Date   3/13/2024    Discharge Disposition   Home or Self Care    Discharge Destination                                 Attending Provider: (none)   Allergies: No Known Allergies    Isolation: Contact   Infection: Human Metapneumovirus  (03/07/24)   Code Status: CPR    Ht: 180.3 cm (71\")   Wt: 159 kg (349 lb 12.8 oz)    Admission Cmt: None   Principal Problem: Acute hypoxemic respiratory failure [J96.01]                   Active Insurance as of 3/7/2024       Primary Coverage       Payor Plan Insurance Group Employer/Plan Group    [facility]        Payor Plan Address Payor Plan Phone Number Payor Plan Fax Number Effective Dates     [address] [address]   3/7/2024 - None Entered    [facility] [phone]         Subscriber Name Subscriber Birth Date Member ID       JOSH HINOJOSA 1975 [id number]                     Emergency Contacts        (Rel.) Home Phone Work Phone Mobile Phone    elizabeth washington (Sister) -- -- [phone]             Discharge Summary        Dougie Lang MD at 03/13/24 [facility]8          [facility]    Admit date: 3/7/2024  Discharge date: 3/13/2024    Admission/discharge diagnosis:    Acute Hypoxemic and hypercapnic respiratory failure -- continue pap at home on " d/c  Acute on chronic diastolic chf -- echo pretty normal looking. Will continue lasix on d/c seen by cardiology, they did not make any recommendations  Penumonia -- finish antibiotic on d/c  COPD with ae -- on steroids, bronchodilators  Encephalopathy --  resovled  Tobacco and marijuana abuse  Morbid obesity -- suspect restrictive lung disease/OHV/SHARMILA  HTN --  po meds now with better control    HPI: reviewed as per Dr. Hauser:  Josh is a 48-year-old male who presents with altered mental status and shortness of breath. Has a history of cardiomyopathy and continues to smoke cigarettes and marijuana on a regular basis. Has had multiple hospitalizations predominantly at [facility] and [facility] for respiratory failure and pulmonary edema. Family says that he has been intubated twice for respiratory failure. They said that he was doing okay up until recently when medicines were discontinued but unclear if this was due to medicines being stopped by his primary care physician at [facility] or if he just stopped following up and they did not continue to refill his medicines. Confused with hypercapnic failure and chest x-ray shows congestion left greater than right. Discussed with the ER physician. Unable to obtain any history from patient due to altered mental status. On NIPPV and increased work of breathing has stabilized.     Hospital course:  Mr. Hinojosa improved uneventfully. He did take a while to get oxygen weaned to a reasonable level and will need to remain on 4lpm on d/c until follow up with pulmonary    Dispo: home    Activity: as tolerated, no smoking avoid secondhand smoke    Diet: pre admission    Follow up:  Dr. Haro in 2 weeks  PCP in 2-4 weeks    Greater than 30 mins spent in discharging patient home.       Discharge Medications        New Medications        Instructions Start Date   cefdinir 300 MG capsule  Commonly known as: OMNICEF   300 mg, Oral, 2 Times Daily      furosemide 40 MG  tablet  Commonly known as: Lasix   40 mg, Oral, Daily      ipratropium-albuterol 0.5-2.5 mg/3 ml nebulizer  Commonly known as: DUO-NEB   3 mL, Nebulization, Every 4 Hours PRN      lisinopril 10 MG tablet  Commonly known as: PRINIVIL,ZESTRIL   10 mg, Oral, Every 24 Hours Scheduled   Start Date: March 14, 2024     predniSONE 20 MG tablet  Commonly known as: DELTASONE   20 mg, Oral, 2 Times Daily      Trelegy Ellipta 200-62.5-25 MCG/ACT inhaler  Generic drug: Fluticasone-Umeclidin-Vilant   1 puff, Inhalation, Daily - RT                  Electronically signed by Dougie Galdamez MD at 03/13/24 1054       Discharge Order (From admission, onward)       Start     Ordered    03/13/24 1053  Discharge patient  Once        Expected Discharge Date: 03/13/24   Discharge Disposition: Home or Self Care   Physician of Record for Attribution - Please select from Treatment Team: DOUGIE GALDAMEZ [[id number]]   Review needed by CMO to determine Physician of Record: No      Question Answer Comment   Physician of Record for Attribution - Please select from Treatment Team DOUGIE GALDAMEZ    Review needed by CMO to determine Physician of Record No        03/13/24 1054                    Edelmira Kern RN   Registered Nurse  Case Management     Case Management/Social Work      Signed     Date of Service: 03/13/24 1315  Creation Time: 03/13/24 1315     Signed         Case Management Discharge Note        Final Note: Home with continuous O2 and nebulizer thru [facility], sister to transport.     Provided Post Acute Provider List?: N/A  Provided Post Acute Provider Quality & Resource List?: N/A     Selected Continued Care - Admitted Since 3/7/2024         Destination    No services have been selected for the patient.                    Durable Medical Equipment         Service Provider Selected Services Address Phone Fax Patient Preferred     [facility] [address], [place] [id number]  [phone] --                  Dialysis/Infusion    No services have been selected for the patient.                    Home Medical Care    No services have been selected for the patient.                    Therapy    No services have been selected for the patient.                    Community Resources    No services have been selected for the patient.                    Community & [facility]    No services have been selected for the patient.                         Transportation Services  Private: Car     Final Discharge Disposition Code: 01 - home or self-care

## 2024-03-13 NOTE — CASE MANAGEMENT/SOCIAL WORK
Case Management Discharge Note      Final Note: Home with continuous O2 and nebulizer thru [facility], sister to transport.    Provided Post Acute Provider List?: N/A  Provided Post Acute Provider Quality & Resource List?: N/A    Selected Continued Care - Admitted Since 3/7/2024       Destination    No services have been selected for the patient.                Durable Medical Equipment       Service Provider Selected Services Address Phone Fax Patient Preferred    [facility] [address] [address] [phone] --              Dialysis/Infusion    No services have been selected for the patient.                Home Medical Care    No services have been selected for the patient.                Therapy    No services have been selected for the patient.                Community Resources    No services have been selected for the patient.                Community & DME    No services have been selected for the patient.                    Transportation Services  Private: Car    Final Discharge Disposition Code: 01 - home or self-care

## 2024-03-13 NOTE — DISCHARGE SUMMARY
[facility]    Admit date: 3/7/2024  Discharge date: 3/13/2024    Admission/discharge diagnosis:    Acute Hypoxemic and hypercapnic respiratory failure -- continue pap at home on d/c  Acute on chronic diastolic chf -- echo pretty normal looking. Will continue lasix on d/c seen by cardiology, they did not make any recommendations  Penumonia -- finish antibiotic on d/c  COPD with ae -- on steroids, bronchodilators  Encephalopathy --  resovled  Tobacco and marijuana abuse  Morbid obesity -- suspect restrictive lung disease/OHV/SHARMILA  HTN --  po meds now with better control    HPI: reviewed as per Dr. Hauser:  Josh is a 48-year-old male who presents with altered mental status and shortness of breath. Has a history of cardiomyopathy and continues to smoke cigarettes and marijuana on a regular basis. Has had multiple hospitalizations predominantly at [facility] and [facility] for respiratory failure and pulmonary edema. Family says that he has been intubated twice for respiratory failure. They said that he was doing okay up until recently when medicines were discontinued but unclear if this was due to medicines being stopped by his primary care physician at [facility] or if he just stopped following up and they did not continue to refill his medicines. Confused with hypercapnic failure and chest x-ray shows congestion left greater than right. Discussed with the ER physician. Unable to obtain any history from patient due to altered mental status. On NIPPV and increased work of breathing has stabilized.     Hospital course:  Mr. Hinojosa improved uneventfully. He did take a while to get oxygen weaned to a reasonable level and will need to remain on 4lpm on d/c until follow up with pulmonary    Dispo: home    Activity: as tolerated, no smoking avoid secondhand smoke    Diet: pre admission    Follow up:  Dr. Haro in 2 weeks  PCP in 2-4 weeks    Greater than 30 mins spent in discharging patient home.        Discharge Medications        New Medications        Instructions Start Date   cefdinir 300 MG capsule  Commonly known as: OMNICEF   300 mg, Oral, 2 Times Daily      furosemide 40 MG tablet  Commonly known as: Lasix   40 mg, Oral, Daily      ipratropium-albuterol 0.5-2.5 mg/3 ml nebulizer  Commonly known as: DUO-NEB   3 mL, Nebulization, Every 4 Hours PRN      lisinopril 10 MG tablet  Commonly known as: PRINIVIL,ZESTRIL   10 mg, Oral, Every 24 Hours Scheduled   Start Date: March 14, 2024     predniSONE 20 MG tablet  Commonly known as: DELTASONE   20 mg, Oral, 2 Times Daily      Trelegy Ellipta 200-62.5-25 MCG/ACT inhaler  Generic drug: Fluticasone-Umeclidin-Vilant   1 puff, Inhalation, Daily - RT

## 2024-03-13 NOTE — PLAN OF CARE
Goal Outcome Evaluation:  Plan of Care Reviewed With: patient        Progress: improving  Outcome Evaluation: vss. weaned to 5L. bipap intermittently overnight. iv lasix. iv abx. iv steroids. urine output recorded.

## 2024-03-13 NOTE — PLAN OF CARE
Goal Outcome Evaluation:  Plan of Care Reviewed With: patient        Progress: improving  Outcome Evaluation: O2 down to 4L per NC this am.  patient current has oxygen at home.  plan to discharge home this afternoon

## 2024-03-13 NOTE — CASE MANAGEMENT/SOCIAL WORK
Continued Stay Note  [facility]     Patient Name: Josh Hinojosa  MRN: [id number]  Today's Date: 3/13/2024    Admit Date: 3/7/2024    Plan: Home with continuous O2 and nebulizer thru [facility], sister to transport.   Discharge Plan       Row Name 03/13/24 1314       Plan    Plan Home with continuous O2 and nebulizer thru [facility], sister to transport.    Patient/Family in Agreement with Plan yes    Plan Comments DC orders noted. CCP met with pt at bedside to discuss DC. Pt explained he has an oxygen concentrator from [employer] but it has not been serviced in years. CCP discussed making a referral to a new DME company who can provide O2 and a nebulizer (pt denies having a nebulizer). Pt is agreeable to new DME company and denies preference. [facility] notified and she will deliver O2 tank and nebulizer to the bedside. Pt verbalized understanding and is agreeable. Finesse SARAH/CCP    Final Discharge Disposition Code 01 - home or self-care    Final Note Home with continuous O2 and nebulizer thru [facility], sister to transport.                   Discharge Codes    No documentation.                 Expected Discharge Date and Time       Expected Discharge Date Expected Discharge Time    Mar 13, 2024               Edelmira Kern RN

## 2024-03-14 ENCOUNTER — READMISSION MANAGEMENT (OUTPATIENT)
Dept: CALL CENTER | Facility: HOSPITAL | Age: 49
End: 2024-03-14
Payer: COMMERCIAL

## 2024-03-14 NOTE — PROGRESS NOTES
Enter Query Response Below      Query Response: community acquired pneumonia             If applicable, please update the problem list.     Patient: Josh Hinojosa        : 1975  Account: [id number]           Admit Date:         How to Respond to this query:       a. Click New Note     b. Answer query within the yellow box.                c. Update the Problem List, if applicable.      If you have any questions about this query contact me at: [email or web address]     Dr. Lang,    Patient with history of COPD and diastolic CHF presented 3/7 for acute dyspnea. Notes include pneumonia, acute hypoxic respiratory failure, acute on chronic diastolic CHF, and COPD exacerbation. Patient treated with monitoring, supplemental oxygen, Solu-medrol, IV ceftriaxone 3/7 - 3/12, and discharge summary with orders for cefdinir.     Please clarify the type of pneumonia the patient was treated/monitored for:     - Gram negative pneumonia (excluding Haemophilus influenzae)  - Bacterial pneumonia unspecified  - Other ___________________  - Unable to clinically determine    By submitting this query, we are merely seeking further clarification of documentation to accurately reflect all conditions that you are monitoring, evaluating, treating or that extend the hospitalization or utilize additional resources of care. Please utilize your independent clinical judgment when addressing the question(s) above.     This query and your response, once completed, will be entered into the legal medical record.    Sincerely,  Harshil Singh RN, CDS  Clinical Documentation Integrity Program

## 2024-03-14 NOTE — OUTREACH NOTE
Prep Survey      Flowsheet Row Responses   [facility] facility patient discharged from? [facility]   Is LACE score < 7 ? No   Eligibility Readm Mgmt   Discharge diagnosis Acute hypoxemic respiratory failure   Does the patient have one of the following disease processes/diagnoses(primary or secondary)? Other   Does the patient have Home health ordered? No   Is there a DME ordered? Yes   What DME was ordered? [facility] continuous oxygen   Medication alerts for this patient see avs   Prep survey completed? Yes            Iveth MONTOYA - Registered Nurse

## 2024-03-19 ENCOUNTER — READMISSION MANAGEMENT (OUTPATIENT)
Dept: CALL CENTER | Facility: HOSPITAL | Age: 49
End: 2024-03-19
Payer: COMMERCIAL

## 2024-03-19 NOTE — OUTREACH NOTE
Medical Week 1 Survey      Flowsheet Row Responses   [facility] patient discharged from? [place]   Does the patient have one of the following disease processes/diagnoses(primary or secondary)? Other   Week 1 attempt successful? No   Unsuccessful attempts Attempt 1            Oliva MADRIGAL - Licensed Nurse

## 2024-03-26 ENCOUNTER — READMISSION MANAGEMENT (OUTPATIENT)
Dept: CALL CENTER | Facility: HOSPITAL | Age: 49
End: 2024-03-26
Payer: COMMERCIAL

## 2024-03-26 ENCOUNTER — APPOINTMENT (OUTPATIENT)
Dept: GENERAL RADIOLOGY | Facility: HOSPITAL | Age: 49
DRG: 193 | End: 2024-03-26
Payer: COMMERCIAL

## 2024-03-26 ENCOUNTER — APPOINTMENT (OUTPATIENT)
Dept: CT IMAGING | Facility: HOSPITAL | Age: 49
DRG: 193 | End: 2024-03-26
Payer: COMMERCIAL

## 2024-03-26 ENCOUNTER — HOSPITAL ENCOUNTER (INPATIENT)
Facility: HOSPITAL | Age: 49
LOS: 5 days | Discharge: HOME | DRG: 193 | End: 2024-03-31
Attending: EMERGENCY MEDICINE | Admitting: HOSPITALIST
Payer: COMMERCIAL

## 2024-03-26 DIAGNOSIS — J96.22 ACUTE ON CHRONIC RESPIRATORY FAILURE WITH HYPOXIA AND HYPERCAPNIA: Primary | ICD-10-CM

## 2024-03-26 DIAGNOSIS — R07.81 PLEURITIC CHEST PAIN: ICD-10-CM

## 2024-03-26 DIAGNOSIS — J96.21 ACUTE ON CHRONIC RESPIRATORY FAILURE WITH HYPOXIA AND HYPERCAPNIA: Primary | ICD-10-CM

## 2024-03-26 LAB
ALBUMIN SERPL-MCNC: 4 G/DL (ref 3.5–5.2)
ALBUMIN/GLOB SERPL: 1.3 G/DL
ALP SERPL-CCNC: 58 U/L (ref 39–117)
ALT SERPL W P-5'-P-CCNC: 34 U/L (ref 1–41)
ANION GAP SERPL CALCULATED.3IONS-SCNC: 9 MMOL/L (ref 5–15)
ARTERIAL PATENCY WRIST A: POSITIVE
AST SERPL-CCNC: 21 U/L (ref 1–40)
ATMOSPHERIC PRESS: 739.3 MMHG
B PARAPERT DNA SPEC QL NAA+PROBE: NOT DETECTED
B PERT DNA SPEC QL NAA+PROBE: NOT DETECTED
BASE EXCESS BLDA CALC-SCNC: 5.3 MMOL/L (ref 0–2)
BASOPHILS # BLD AUTO: 0.04 10*3/MM3 (ref 0–0.2)
BASOPHILS NFR BLD AUTO: 0.4 % (ref 0–1.5)
BDY SITE: ABNORMAL
BILIRUB SERPL-MCNC: 0.4 MG/DL (ref 0–1.2)
BUN SERPL-MCNC: 21 MG/DL (ref 6–20)
BUN/CREAT SERPL: 20.2 (ref 7–25)
C PNEUM DNA NPH QL NAA+NON-PROBE: NOT DETECTED
CALCIUM SPEC-SCNC: 9.3 MG/DL (ref 8.6–10.5)
CHLORIDE SERPL-SCNC: 100 MMOL/L (ref 98–107)
CO2 BLDA-SCNC: 34.2 MMOL/L (ref 23–27)
CO2 SERPL-SCNC: 29 MMOL/L (ref 22–29)
CREAT SERPL-MCNC: 1.04 MG/DL (ref 0.76–1.27)
DEPRECATED RDW RBC AUTO: 44.2 FL (ref 37–54)
DEVICE COMMENT: ABNORMAL
EGFRCR SERPLBLD CKD-EPI 2021: 89.1 ML/MIN/1.73
EOSINOPHIL # BLD AUTO: 0.21 10*3/MM3 (ref 0–0.4)
EOSINOPHIL NFR BLD AUTO: 2 % (ref 0.3–6.2)
ERYTHROCYTE [DISTWIDTH] IN BLOOD BY AUTOMATED COUNT: 13.1 % (ref 12.3–15.4)
FLUAV SUBTYP SPEC NAA+PROBE: NOT DETECTED
FLUBV RNA ISLT QL NAA+PROBE: NOT DETECTED
GAS FLOW AIRWAY: 15 LPM
GLOBULIN UR ELPH-MCNC: 3 GM/DL
GLUCOSE SERPL-MCNC: 168 MG/DL (ref 65–99)
HADV DNA SPEC NAA+PROBE: NOT DETECTED
HCO3 BLDA-SCNC: 32.5 MMOL/L (ref 22–28)
HCOV 229E RNA SPEC QL NAA+PROBE: NOT DETECTED
HCOV HKU1 RNA SPEC QL NAA+PROBE: NOT DETECTED
HCOV NL63 RNA SPEC QL NAA+PROBE: NOT DETECTED
HCOV OC43 RNA SPEC QL NAA+PROBE: NOT DETECTED
HCT VFR BLD AUTO: 48.4 % (ref 37.5–51)
HEMODILUTION: NO
HGB BLD-MCNC: 16.1 G/DL (ref 13–17.7)
HMPV RNA NPH QL NAA+NON-PROBE: NOT DETECTED
HOLD SPECIMEN: NORMAL
HPIV1 RNA ISLT QL NAA+PROBE: NOT DETECTED
HPIV2 RNA SPEC QL NAA+PROBE: NOT DETECTED
HPIV3 RNA NPH QL NAA+PROBE: NOT DETECTED
HPIV4 P GENE NPH QL NAA+PROBE: NOT DETECTED
IMM GRANULOCYTES # BLD AUTO: 0.06 10*3/MM3 (ref 0–0.05)
IMM GRANULOCYTES NFR BLD AUTO: 0.6 % (ref 0–0.5)
LYMPHOCYTES # BLD AUTO: 1.83 10*3/MM3 (ref 0.7–3.1)
LYMPHOCYTES NFR BLD AUTO: 17.6 % (ref 19.6–45.3)
M PNEUMO IGG SER IA-ACNC: NOT DETECTED
MCH RBC QN AUTO: 30.7 PG (ref 26.6–33)
MCHC RBC AUTO-ENTMCNC: 33.3 G/DL (ref 31.5–35.7)
MCV RBC AUTO: 92.2 FL (ref 79–97)
MODALITY: ABNORMAL
MONOCYTES # BLD AUTO: 0.72 10*3/MM3 (ref 0.1–0.9)
MONOCYTES NFR BLD AUTO: 6.9 % (ref 5–12)
NEUTROPHILS NFR BLD AUTO: 7.51 10*3/MM3 (ref 1.7–7)
NEUTROPHILS NFR BLD AUTO: 72.5 % (ref 42.7–76)
NRBC BLD AUTO-RTO: 0 /100 WBC (ref 0–0.2)
NT-PROBNP SERPL-MCNC: 52.3 PG/ML (ref 0–450)
PCO2 BLDA: 54.4 MM HG (ref 35–45)
PH BLDA: 7.38 PH UNITS (ref 7.35–7.45)
PLATELET # BLD AUTO: 223 10*3/MM3 (ref 140–450)
PMV BLD AUTO: 9.7 FL (ref 6–12)
PO2 BLDA: 129.1 MM HG (ref 80–100)
POTASSIUM SERPL-SCNC: 4.8 MMOL/L (ref 3.5–5.2)
PROCALCITONIN SERPL-MCNC: 0.06 NG/ML (ref 0–0.25)
PROT SERPL-MCNC: 7 G/DL (ref 6–8.5)
QT INTERVAL: 381 MS
QTC INTERVAL: 482 MS
RBC # BLD AUTO: 5.25 10*6/MM3 (ref 4.14–5.8)
RHINOVIRUS RNA SPEC NAA+PROBE: NOT DETECTED
RSV RNA NPH QL NAA+NON-PROBE: NOT DETECTED
S PNEUM AG SPEC QL LA: NEGATIVE
SAO2 % BLDCOA: 98.8 % (ref 92–98.5)
SARS-COV-2 RNA NPH QL NAA+NON-PROBE: NOT DETECTED
SODIUM SERPL-SCNC: 138 MMOL/L (ref 136–145)
TOTAL RATE: 16 BREATHS/MINUTE
TROPONIN T SERPL HS-MCNC: 16 NG/L
WBC NRBC COR # BLD AUTO: 10.37 10*3/MM3 (ref 3.4–10.8)
WHOLE BLOOD HOLD COAG: NORMAL
WHOLE BLOOD HOLD SPECIMEN: NORMAL

## 2024-03-26 PROCEDURE — 99291 CRITICAL CARE FIRST HOUR: CPT

## 2024-03-26 PROCEDURE — 84145 PROCALCITONIN (PCT): CPT | Performed by: EMERGENCY MEDICINE

## 2024-03-26 PROCEDURE — 25010000002 FUROSEMIDE PER 20 MG: Performed by: INTERNAL MEDICINE

## 2024-03-26 PROCEDURE — 94799 UNLISTED PULMONARY SVC/PX: CPT

## 2024-03-26 PROCEDURE — 93010 ELECTROCARDIOGRAM REPORT: CPT | Performed by: INTERNAL MEDICINE

## 2024-03-26 PROCEDURE — 71275 CT ANGIOGRAPHY CHEST: CPT

## 2024-03-26 PROCEDURE — 83880 ASSAY OF NATRIURETIC PEPTIDE: CPT | Performed by: EMERGENCY MEDICINE

## 2024-03-26 PROCEDURE — 71045 X-RAY EXAM CHEST 1 VIEW: CPT

## 2024-03-26 PROCEDURE — 93005 ELECTROCARDIOGRAM TRACING: CPT | Performed by: EMERGENCY MEDICINE

## 2024-03-26 PROCEDURE — 85025 COMPLETE CBC W/AUTO DIFF WBC: CPT | Performed by: EMERGENCY MEDICINE

## 2024-03-26 PROCEDURE — 63710000001 PREDNISONE PER 1 MG: Performed by: INTERNAL MEDICINE

## 2024-03-26 PROCEDURE — 94664 DEMO&/EVAL PT USE INHALER: CPT

## 2024-03-26 PROCEDURE — 36600 WITHDRAWAL OF ARTERIAL BLOOD: CPT

## 2024-03-26 PROCEDURE — G0378 HOSPITAL OBSERVATION PER HR: HCPCS

## 2024-03-26 PROCEDURE — 94640 AIRWAY INHALATION TREATMENT: CPT

## 2024-03-26 PROCEDURE — 25010000002 ONDANSETRON PER 1 MG: Performed by: EMERGENCY MEDICINE

## 2024-03-26 PROCEDURE — 80053 COMPREHEN METABOLIC PANEL: CPT | Performed by: EMERGENCY MEDICINE

## 2024-03-26 PROCEDURE — 94660 CPAP INITIATION&MGMT: CPT

## 2024-03-26 PROCEDURE — 94761 N-INVAS EAR/PLS OXIMETRY MLT: CPT

## 2024-03-26 PROCEDURE — 25510000001 IOPAMIDOL PER 1 ML: Performed by: EMERGENCY MEDICINE

## 2024-03-26 PROCEDURE — 25010000002 METHYLPREDNISOLONE PER 40 MG: Performed by: INTERNAL MEDICINE

## 2024-03-26 PROCEDURE — 25010000002 MORPHINE PER 10 MG: Performed by: HOSPITALIST

## 2024-03-26 PROCEDURE — 25010000002 MORPHINE PER 10 MG: Performed by: EMERGENCY MEDICINE

## 2024-03-26 PROCEDURE — 82803 BLOOD GASES ANY COMBINATION: CPT

## 2024-03-26 PROCEDURE — 0202U NFCT DS 22 TRGT SARS-COV-2: CPT | Performed by: INTERNAL MEDICINE

## 2024-03-26 PROCEDURE — 84484 ASSAY OF TROPONIN QUANT: CPT | Performed by: EMERGENCY MEDICINE

## 2024-03-26 RX ORDER — FUROSEMIDE 40 MG/1
40 TABLET ORAL DAILY
Status: DISCONTINUED | OUTPATIENT
Start: 2024-03-26 | End: 2024-03-26

## 2024-03-26 RX ORDER — ACETAMINOPHEN 325 MG/1
650 TABLET ORAL EVERY 4 HOURS PRN
Status: DISCONTINUED | OUTPATIENT
Start: 2024-03-26 | End: 2024-03-31 | Stop reason: HOSPADM

## 2024-03-26 RX ORDER — SODIUM CHLORIDE 0.9 % (FLUSH) 0.9 %
10 SYRINGE (ML) INJECTION AS NEEDED
Status: DISCONTINUED | OUTPATIENT
Start: 2024-03-26 | End: 2024-03-30

## 2024-03-26 RX ORDER — HYDROCODONE BITARTRATE AND ACETAMINOPHEN 5; 325 MG/1; MG/1
1 TABLET ORAL EVERY 6 HOURS PRN
Status: DISCONTINUED | OUTPATIENT
Start: 2024-03-26 | End: 2024-03-31 | Stop reason: HOSPADM

## 2024-03-26 RX ORDER — BISACODYL 10 MG
10 SUPPOSITORY, RECTAL RECTAL DAILY PRN
Status: DISCONTINUED | OUTPATIENT
Start: 2024-03-26 | End: 2024-03-31 | Stop reason: HOSPADM

## 2024-03-26 RX ORDER — IPRATROPIUM BROMIDE AND ALBUTEROL SULFATE 2.5; .5 MG/3ML; MG/3ML
3 SOLUTION RESPIRATORY (INHALATION) EVERY 4 HOURS PRN
Status: DISCONTINUED | OUTPATIENT
Start: 2024-03-26 | End: 2024-03-31 | Stop reason: HOSPADM

## 2024-03-26 RX ORDER — MORPHINE SULFATE 2 MG/ML
4 INJECTION, SOLUTION INTRAMUSCULAR; INTRAVENOUS EVERY 4 HOURS PRN
Status: DISCONTINUED | OUTPATIENT
Start: 2024-03-26 | End: 2024-03-30

## 2024-03-26 RX ORDER — ONDANSETRON 4 MG/1
4 TABLET, ORALLY DISINTEGRATING ORAL EVERY 6 HOURS PRN
Status: DISCONTINUED | OUTPATIENT
Start: 2024-03-26 | End: 2024-03-31 | Stop reason: HOSPADM

## 2024-03-26 RX ORDER — POLYETHYLENE GLYCOL 3350 17 G/17G
17 POWDER, FOR SOLUTION ORAL DAILY PRN
Status: DISCONTINUED | OUTPATIENT
Start: 2024-03-26 | End: 2024-03-31 | Stop reason: HOSPADM

## 2024-03-26 RX ORDER — MORPHINE SULFATE 2 MG/ML
4 INJECTION, SOLUTION INTRAMUSCULAR; INTRAVENOUS ONCE
Status: COMPLETED | OUTPATIENT
Start: 2024-03-26 | End: 2024-03-26

## 2024-03-26 RX ORDER — AMOXICILLIN 250 MG
2 CAPSULE ORAL 2 TIMES DAILY PRN
Status: DISCONTINUED | OUTPATIENT
Start: 2024-03-26 | End: 2024-03-31 | Stop reason: HOSPADM

## 2024-03-26 RX ORDER — CEFDINIR 300 MG/1
300 CAPSULE ORAL EVERY 12 HOURS SCHEDULED
Qty: 10 CAPSULE | Refills: 0 | Status: COMPLETED | OUTPATIENT
Start: 2024-03-26 | End: 2024-03-30

## 2024-03-26 RX ORDER — METHYLPREDNISOLONE SODIUM SUCCINATE 40 MG/ML
40 INJECTION, POWDER, LYOPHILIZED, FOR SOLUTION INTRAMUSCULAR; INTRAVENOUS EVERY 8 HOURS
Status: DISCONTINUED | OUTPATIENT
Start: 2024-03-26 | End: 2024-03-26

## 2024-03-26 RX ORDER — IPRATROPIUM BROMIDE AND ALBUTEROL SULFATE 2.5; .5 MG/3ML; MG/3ML
3 SOLUTION RESPIRATORY (INHALATION) EVERY 4 HOURS PRN
COMMUNITY

## 2024-03-26 RX ORDER — BISACODYL 5 MG/1
5 TABLET, DELAYED RELEASE ORAL DAILY PRN
Status: DISCONTINUED | OUTPATIENT
Start: 2024-03-26 | End: 2024-03-31 | Stop reason: HOSPADM

## 2024-03-26 RX ORDER — ACETAMINOPHEN 650 MG/1
650 SUPPOSITORY RECTAL EVERY 4 HOURS PRN
Status: DISCONTINUED | OUTPATIENT
Start: 2024-03-26 | End: 2024-03-30

## 2024-03-26 RX ORDER — LISINOPRIL 10 MG/1
10 TABLET ORAL
Status: DISCONTINUED | OUTPATIENT
Start: 2024-03-26 | End: 2024-03-31 | Stop reason: HOSPADM

## 2024-03-26 RX ORDER — PREDNISONE 20 MG/1
40 TABLET ORAL
Status: DISCONTINUED | OUTPATIENT
Start: 2024-03-26 | End: 2024-03-31 | Stop reason: HOSPADM

## 2024-03-26 RX ORDER — FUROSEMIDE 10 MG/ML
40 INJECTION INTRAMUSCULAR; INTRAVENOUS
Status: DISCONTINUED | OUTPATIENT
Start: 2024-03-26 | End: 2024-03-28

## 2024-03-26 RX ORDER — IPRATROPIUM BROMIDE AND ALBUTEROL SULFATE 2.5; .5 MG/3ML; MG/3ML
3 SOLUTION RESPIRATORY (INHALATION) ONCE
Status: COMPLETED | OUTPATIENT
Start: 2024-03-26 | End: 2024-03-26

## 2024-03-26 RX ORDER — SODIUM CHLORIDE 9 MG/ML
40 INJECTION, SOLUTION INTRAVENOUS AS NEEDED
Status: DISCONTINUED | OUTPATIENT
Start: 2024-03-26 | End: 2024-03-31 | Stop reason: HOSPADM

## 2024-03-26 RX ORDER — SODIUM CHLORIDE 0.9 % (FLUSH) 0.9 %
10 SYRINGE (ML) INJECTION AS NEEDED
Status: DISCONTINUED | OUTPATIENT
Start: 2024-03-26 | End: 2024-03-31 | Stop reason: HOSPADM

## 2024-03-26 RX ORDER — ACETAMINOPHEN 160 MG/5ML
650 SOLUTION ORAL EVERY 4 HOURS PRN
Status: DISCONTINUED | OUTPATIENT
Start: 2024-03-26 | End: 2024-03-30

## 2024-03-26 RX ORDER — ONDANSETRON 2 MG/ML
4 INJECTION INTRAMUSCULAR; INTRAVENOUS EVERY 6 HOURS PRN
Status: DISCONTINUED | OUTPATIENT
Start: 2024-03-26 | End: 2024-03-31 | Stop reason: HOSPADM

## 2024-03-26 RX ORDER — FUROSEMIDE 10 MG/ML
20 INJECTION INTRAMUSCULAR; INTRAVENOUS ONCE
Status: DISCONTINUED | OUTPATIENT
Start: 2024-03-26 | End: 2024-03-26

## 2024-03-26 RX ORDER — SODIUM CHLORIDE 0.9 % (FLUSH) 0.9 %
10 SYRINGE (ML) INJECTION EVERY 12 HOURS SCHEDULED
Status: DISCONTINUED | OUTPATIENT
Start: 2024-03-26 | End: 2024-03-31 | Stop reason: HOSPADM

## 2024-03-26 RX ORDER — ONDANSETRON 2 MG/ML
4 INJECTION INTRAMUSCULAR; INTRAVENOUS ONCE
Status: COMPLETED | OUTPATIENT
Start: 2024-03-26 | End: 2024-03-26

## 2024-03-26 RX ORDER — IPRATROPIUM BROMIDE AND ALBUTEROL SULFATE 2.5; .5 MG/3ML; MG/3ML
3 SOLUTION RESPIRATORY (INHALATION)
Status: DISCONTINUED | OUTPATIENT
Start: 2024-03-26 | End: 2024-03-31 | Stop reason: HOSPADM

## 2024-03-26 RX ADMIN — NYSTATIN 500000 UNITS: 100000 SUSPENSION ORAL at 18:21

## 2024-03-26 RX ADMIN — METHYLPREDNISOLONE SODIUM SUCCINATE 40 MG: 40 INJECTION, POWDER, FOR SOLUTION INTRAMUSCULAR; INTRAVENOUS at 11:17

## 2024-03-26 RX ADMIN — ONDANSETRON 4 MG: 2 INJECTION INTRAMUSCULAR; INTRAVENOUS at 09:19

## 2024-03-26 RX ADMIN — HYDROCODONE BITARTRATE AND ACETAMINOPHEN 1 TABLET: 5; 325 TABLET ORAL at 23:35

## 2024-03-26 RX ADMIN — IPRATROPIUM BROMIDE AND ALBUTEROL SULFATE 3 ML: .5; 3 SOLUTION RESPIRATORY (INHALATION) at 11:13

## 2024-03-26 RX ADMIN — IPRATROPIUM BROMIDE AND ALBUTEROL SULFATE 3 ML: .5; 3 SOLUTION RESPIRATORY (INHALATION) at 15:16

## 2024-03-26 RX ADMIN — MORPHINE SULFATE 4 MG: 2 INJECTION, SOLUTION INTRAMUSCULAR; INTRAVENOUS at 09:20

## 2024-03-26 RX ADMIN — MORPHINE SULFATE 4 MG: 2 INJECTION, SOLUTION INTRAMUSCULAR; INTRAVENOUS at 12:42

## 2024-03-26 RX ADMIN — Medication 10 ML: at 20:48

## 2024-03-26 RX ADMIN — FUROSEMIDE 40 MG: 40 TABLET ORAL at 15:27

## 2024-03-26 RX ADMIN — NYSTATIN 500000 UNITS: 100000 SUSPENSION ORAL at 20:37

## 2024-03-26 RX ADMIN — LISINOPRIL 10 MG: 10 TABLET ORAL at 15:28

## 2024-03-26 RX ADMIN — CEFDINIR 300 MG: 300 CAPSULE ORAL at 20:37

## 2024-03-26 RX ADMIN — IPRATROPIUM BROMIDE AND ALBUTEROL SULFATE 3 ML: .5; 3 SOLUTION RESPIRATORY (INHALATION) at 21:08

## 2024-03-26 RX ADMIN — FUROSEMIDE 40 MG: 10 INJECTION, SOLUTION INTRAMUSCULAR; INTRAVENOUS at 18:21

## 2024-03-26 RX ADMIN — CEFDINIR 300 MG: 300 CAPSULE ORAL at 15:27

## 2024-03-26 RX ADMIN — IPRATROPIUM BROMIDE AND ALBUTEROL SULFATE 3 ML: .5; 3 SOLUTION RESPIRATORY (INHALATION) at 07:36

## 2024-03-26 RX ADMIN — IOPAMIDOL 95 ML: 755 INJECTION, SOLUTION INTRAVENOUS at 09:28

## 2024-03-26 RX ADMIN — MORPHINE SULFATE 4 MG: 2 INJECTION, SOLUTION INTRAMUSCULAR; INTRAVENOUS at 16:09

## 2024-03-26 RX ADMIN — PREDNISONE 40 MG: 20 TABLET ORAL at 15:27

## 2024-03-26 NOTE — PROGRESS NOTES
Clinical Pharmacy Services: Medication History    Josh Hinojosa is a 48 y.o. male presenting to [facility] for   Chief Complaint   Patient presents with    Shortness of Breath       He  has no past medical history on file.    Allergies as of 03/26/2024    (No Known Allergies)       Medication information was obtained from: Pharmacy  Pharmacy and Phone Number:   [facility]  [address]  [facility] [address]  Phone: [phone] Fax: [phone]        Prior to Admission Medications       Prescriptions Last Dose Informant Patient Reported? Taking?    Fluticasone-Umeclidin-Vilant (Trelegy Ellipta) 200-62.5-25 MCG/ACT inhaler  Pharmacy No Yes    Inhale 1 puff Daily.    furosemide (Lasix) 40 MG tablet  Pharmacy No Yes    Take 1 tablet by mouth Daily.    ipratropium-albuterol (DUO-NEB) 0.5-2.5 mg/3 ml nebulizer  Pharmacy Yes Yes    Take 3 mL by nebulization Every 4 (Four) Hours As Needed for Wheezing.    lisinopril (PRINIVIL,ZESTRIL) 10 MG tablet  Pharmacy No Yes    Take 1 tablet by mouth Daily.    nystatin (MYCOSTATIN) 100,000 unit/mL suspension  Pharmacy Yes Yes    Swish and swallow 5 mL 4 (Four) Times a Day.              Medication notes:     This medication list is complete to the best of my knowledge as of 3/26/2024    Please call if questions.    Harshil Rivera  Medication History Technician  [phone]    3/26/2024 10:55 EDT

## 2024-03-26 NOTE — ED NOTES
Nursing report ED to floor  [facility]  48 y.o.  male    HPI :  HPI (Adult)  Stated Reason for Visit: soa for 3 days  History Obtained From: patient    Chief Complaint  Chief Complaint   Patient presents with    Shortness of Breath       Admitting doctor:   Eduardo Luis MD    Admitting diagnosis:   The primary encounter diagnosis was Acute on chronic respiratory failure with hypoxia and hypercapnia. A diagnosis of Pleuritic chest pain was also pertinent to this visit.    Code status:   Current Code Status       Date Active Code Status Order ID Comments User Context       Prior            Allergies:   Patient has no known allergies.    Isolation:   Contact    Intake and Output  No intake or output data in the 24 hours ending 03/26/24 1123    Weight:       03/26/24  0636   Weight: (!) 159 kg (350 lb)       Most recent vitals:   Vitals:    03/26/24 1113 03/26/24 1117 03/26/24 1118 03/26/24 1119   BP:  121/70     Pulse: 78 85 88 82   Resp: 16      Temp:       SpO2: 95% 94% 90% 94%   Weight:       Height:           Active LDAs/IV Access:   Lines, Drains & Airways       Active LDAs       Name Placement date Placement time Site Days    Peripheral IV 03/26/24 0903 Right Antecubital 03/26/24  0903  Antecubital  less than 1                    Labs (abnormal labs have a star):   Labs Reviewed   COMPREHENSIVE METABOLIC PANEL - Abnormal; Notable for the following components:       Result Value    Glucose 168 (*)     BUN 21 (*)     All other components within normal limits    Narrative:     GFR Normal >60  Chronic Kidney Disease <60  Kidney Failure <15     CBC WITH AUTO DIFFERENTIAL - Abnormal; Notable for the following components:    Lymphocyte % 17.6 (*)     Immature Grans % 0.6 (*)     Neutrophils, Absolute 7.51 (*)     Immature Grans, Absolute 0.06 (*)     All other components within normal limits   BLOOD GAS, ARTERIAL - Abnormal; Notable for the following components:    pCO2, Arterial 54.4 (*)     pO2, Arterial 129.1 (*)      HCO3, Arterial 32.5 (*)     Base Excess, Arterial 5.3 (*)     O2 Saturation, Arterial 98.8 (*)     CO2 Content 34.2 (*)     All other components within normal limits   BNP (IN-HOUSE) - Normal    Narrative:     This assay is used as an aid in the diagnosis of individuals suspected of having heart failure. It can be used as an aid in the diagnosis of acute decompensated heart failure (ADHF) in patients presenting with signs and symptoms of ADHF to the emergency department (ED). In addition, NT-proBNP of <300 pg/mL indicates ADHF is not likely.    Age Range Result Interpretation  NT-proBNP Concentration (pg/mL:      <50             Positive            >450                   Gray                 300-450                    Negative             <300    50-75           Positive            >900                  Gray                300-900                  Negative            <300      >75             Positive            >1800                  Gray                300-1800                  Negative            <300   SINGLE HS TROPONIN T - Normal    Narrative:     High Sensitive Troponin T Reference Range:  <14.0 ng/L- Negative Female for AMI  <22.0 ng/L- Negative Male for AMI  >=14 - Abnormal Female indicating possible myocardial injury.  >=22 - Abnormal Male indicating possible myocardial injury.   Clinicians would have to utilize clinical acumen, EKG, Troponin, and serial changes to determine if it is an Acute Myocardial Infarction or myocardial injury due to an underlying chronic condition.        PROCALCITONIN - Normal    Narrative:     As a Marker for Sepsis (Non-Neonates):    1. <0.5 ng/mL represents a low risk of severe sepsis and/or septic shock.  2. >2 ng/mL represents a high risk of severe sepsis and/or septic shock.    As a Marker for Lower Respiratory Tract Infections that require antibiotic therapy:    PCT on Admission    Antibiotic Therapy       6-12 Hrs later    >0.5                Strongly  "Recommended  >0.25 - <0.5        Recommended   0.1 - 0.25          Discouraged              Remeasure/reassess PCT  <0.1                Strongly Discouraged     Remeasure/reassess PCT    As 28 day mortality risk marker: \"Change in Procalcitonin Result\" (>80% or <=80%) if Day 0 (or Day 1) and Day 4 values are available. Refer to http://[email or web address]-pct-calculator.com    Change in PCT <=80%  A decrease of PCT levels below or equal to 80% defines a positive change in PCT test result representing a higher risk for 28-day all-cause mortality of patients diagnosed with severe sepsis for septic shock.    Change in PCT >80%  A decrease of PCT levels of more than 80% defines a negative change in PCT result representing a lower risk for 28-day all-cause mortality of patients diagnosed with severe sepsis or septic shock.      STREP PNEUMO AG, URINE OR CSF   RESPIRATORY CULTURE   RAINBOW DRAW    Narrative:     The following orders were created for panel order [place] Draw.  Procedure                               Abnormality         Status                     ---------                               -----------         ------                     Green Top (Gel)[[id number]]                                  Final result               Lavender Top[[id number]]                                     Final result               Gold Top - SST[[id number]]                                                              Light Blue Top[[id number]]                                   Final result                 Please view results for these tests on the individual orders.   BLOOD GAS, ARTERIAL   BLOOD GAS, ARTERIAL   CBC AND DIFFERENTIAL    Narrative:     The following orders were created for panel order CBC & Differential.  Procedure                               Abnormality         Status                     ---------                               -----------         ------                     CBC Auto Differential[[id number]]        Abnormal           "  Final result                 Please view results for these tests on the individual orders.   GREEN TOP   LAVENDER TOP   LIGHT BLUE TOP       EKG:   ECG 12 Lead ED Triage Standing Order; SOA   Final Result   HEART RATE= 96  bpm   RR Interval= 625  ms   PA Interval= 166  ms   P Horizontal Axis= -35  deg   P Front Axis= 46  deg   QRSD Interval= 162  ms   QT Interval= 381  ms   QTcB= 482  ms   QRS Axis= -39  deg   T Wave Axis= -21  deg   - ABNORMAL ECG -   Sinus rhythm   Probable left atrial enlargement   Right bundle branch block   No change from previous tracing   Electronically Signed By: Abby Loera ([facility]) 26-Mar-2024 10:56:11   Date and Time of Study: 2024-03-26 06:44:09          Meds given in ED:   Medications   sodium chloride 0.9 % flush 10 mL (has no administration in time range)   ipratropium-albuterol (DUO-NEB) nebulizer solution 3 mL (3 mL Nebulization Given 3/26/24 1113)   methylPREDNISolone sodium succinate (SOLU-Medrol) injection 40 mg (40 mg Intravenous Given 3/26/24 1117)   ondansetron (ZOFRAN) injection 4 mg (4 mg Intravenous Given 3/26/24 0919)   morphine injection 4 mg (4 mg Intravenous Given 3/26/24 0920)   ipratropium-albuterol (DUO-NEB) nebulizer solution 3 mL (3 mL Nebulization Given 3/26/24 0736)   iopamidol (ISOVUE-370) 76 % injection 100 mL (95 mL Intravenous Given by Other 3/26/24 0928)       Imaging results:  CT Angiogram Chest    Result Date: 3/26/2024   1. No pulmonary embolism identified though there is suboptimal opacification of the pulmonary arterial system and segmental and subsegmental pulmonary arteries are poorly evaluated. 2. Enlarged main pulmonary artery, can be seen with pulmonary artery hypertension. 3. Bibasilar lung opacities may represent partial atelectasis or bronchopneumonia in the appropriate clinical setting. 4. Borderline cardiomegaly. 5. Hepatic steatosis. 6. Cholelithiasis  This report was finalized on 3/26/2024 10:00 AM by Dr. Mati Kirby M.D on  Workstation: [id number]      XR Chest 1 View    Result Date: 3/26/2024  FINDINGS AND IMPRESSION: The lungs are hypoinflated. Evaluation is suboptimal due to patient body habitus. There is an absence of lung markings within the right lung apex with curvilinear hyperdensity projecting through this area not definitively seen on prior radiograph. Small pneumothorax cannot be excluded and further evaluation with chest CT is recommended.  There is mild to moderate bibasal pulmonary opacification, left greater than right, which has mildly worsened since 3/12/2024. Attention on above-mentioned chest CT is recommended to further characterize. Cardiac silhouette is accentuated by low lung volumes.  This report was finalized on 3/26/2024 7:27 AM by Dr. Dutch Balbuena M.D on Workstation: [employer]       Ambulatory status:   - standby    Social issues:   Social History     Socioeconomic History    Marital status: Single   Tobacco Use    Smoking status: Every Day     Current packs/day: 1.00     Types: Cigarettes   Vaping Use    Vaping status: Never Used   Substance and Sexual Activity    Alcohol use: Never    Drug use: Yes     Types: Marijuana    Sexual activity: Defer       Peripheral Neurovascular  Peripheral Neurovascular (Adult)  Peripheral Neurovascular WDL: WDL    Neuro Cognitive  Neuro Cognitive (Adult)  Cognitive/Neuro/Behavioral WDL: WDL    Learning  Learning Assessment (Adult)  Learning Readiness and Ability: no barriers identified  Education Provided  Person Taught: patient  Teaching Method: verbal instruction  Teaching Focus: symptom/problem overview, risk factors/triggers, diagnostic test, medication administration    Respiratory  Respiratory (Adult)  Airway WDL: WDL  Respiratory WDL  Respiratory WDL: .WDL except (pt complains of soa and L back pain. Pt states he has pain with inspiration. hx COPD. pt wears 2L NC at home. pt 83% on 2L NC at triage. pt now on nonrebreather at 89%)  Breath Sounds  Breath Sounds: All  Fields  All Lung Fields Breath Sounds: Anterior:, diminished  Breath Sounds Post-Respiratory Treatment  Breath Sounds Posttreatment All Fields: All Fields  Breath Sounds Posttreatment All Fields: Anterior:, no change    Abdominal Pain       Pain Assessments  Pain (Adult)  (0-10) Pain Rating: Rest: 8  Pain Location: chest, back  Pain Side/Orientation: left  Response to Pain Interventions: nonverbal indicators absent/decreased    NIH Stroke Scale       Eli Vallecillo RN  03/26/24 11:23 EDT

## 2024-03-26 NOTE — CONSULTS
Date of Consultation: 24    Referral Provider: Robbie Daniel MD     Reason for Consultation:     Encounter Provider: Magali Julio RN    Group of Service: [facility]     Patient Name: Josh Hinojosa    :1975    Chief complaint: chest / flank pain    History of Present Illness:    Josh Hinojosa is a 48 year old male with a history of COPD (on 2L), CHF, chronic hypoxic respiratory failure, cardiomyopathy, current smoker and marijuana use.     He was recently hospitalized for COPD exacerbation, asthma and acute on chronic diastolic CHF.     He presented to the ER on 3/26 complaining of left lateral chest and flank pain. This pain has been ongoing for 2 days and is described as a sharp and stabbing pain. It is worse with a deep breath and movement. Denies injury. He reports mild nonproductive cough. Denies fever, chills, worsening SOA, abdominal pain, swelling, or palpitations. CTA was negative for PE, but with bilateral lower lobe infiltrates.  He was admitted for further evaluation. An echocardiogram has already been ordered and the patient started on small dose steroids of suspected pleurisy.       ECHO 3/7/2024    Left ventricular systolic function is hyperdynamic (EF > 70%). Calculated left ventricular EF = 78.8% Normal left ventricular cavity size noted. Left ventricular wall thickness is consistent with severe concentric hypertrophy. All left ventricular wall segments contract normally. Left ventricular diastolic function was normal.    The right ventricular cavity is mildly dilated. Normal right ventricular systolic function noted.    The left atrial cavity is moderately dilated. The right atrial cavity is moderately dilated.    Trace tricuspid valve regurgitation is present. Insufficient TR velocity profile to estimate the right ventricular systolic pressure.    Borderline dilation of the ascending aorta is present. Aortic arch = 3.7 cm         Past Medical History:    Diagnosis Date    COPD (chronic obstructive pulmonary disease)     Hypertension          History reviewed. No pertinent surgical history.      No Known Allergies      No current facility-administered medications on file prior to encounter.     Current Outpatient Medications on File Prior to Encounter   Medication Sig Dispense Refill    Fluticasone-Umeclidin-Vilant (Trelegy Ellipta) 200-62.5-25 MCG/ACT inhaler Inhale 1 puff Daily. 60 each 0    furosemide (Lasix) 40 MG tablet Take 1 tablet by mouth Daily. 30 tablet 0    ipratropium-albuterol (DUO-NEB) 0.5-2.5 mg/3 ml nebulizer Take 3 mL by nebulization Every 4 (Four) Hours As Needed for Wheezing.      lisinopril (PRINIVIL,ZESTRIL) 10 MG tablet Take 1 tablet by mouth Daily. 30 tablet 0    nystatin (MYCOSTATIN) 100,000 unit/mL suspension Swish and swallow 5 mL 4 (Four) Times a Day.      [DISCONTINUED] ipratropium-albuterol (DUO-NEB) 0.5-2.5 mg/3 ml nebulizer Take 3 mL by nebulization Every 4 (Four) Hours As Needed for Shortness of Air. 360 mL 0         Social History     Socioeconomic History    Marital status: Single   Tobacco Use    Smoking status: Every Day     Current packs/day: 1.00     Types: Cigarettes   Vaping Use    Vaping status: Never Used   Substance and Sexual Activity    Alcohol use: Never    Drug use: Yes     Types: Marijuana    Sexual activity: Defer         History reviewed. No pertinent family history.    REVIEW OF SYSTEMS:   12 point ROS was performed and is negative except as outlined in HPI    REVIEW OF SYSTEMS:   CONSTITUTIONAL: No weight loss, fever, chills.  HEENT: No visual changes or hearing changes.  SKIN: No rash.   RESPIRATORY: No cough or hemoptysis.  GASTROINTESTINAL: No abdominal pain, melena, nausea, or vomiting.  GENITOURINARY: No dysuria or frequency.  NEUROLOGICAL: No numbness or tingling in the extremities.   MUSCULOSKELETAL: No new joint pain.    HEMATOLOGIC: No bleeding or bruising.   LYMPHATICS: No enlarged nodes.   PSYCHIATRIC:  "No severe depression or anxiety.    ENDOCRINOLOGIC: No heat or cold intolerance.       Objective:     Vitals:    03/26/24 1119 03/26/24 1150 03/26/24 1300 03/26/24 1335   BP:   142/84    BP Location:   Left arm    Patient Position:   Sitting    Pulse: 82 83 79    Resp:   19    Temp:   98.3 °F (36.8 °C)    TempSrc:   Oral    SpO2: 94% 93% 94%    Weight:    (!) 160 kg (352 lb 4.7 oz)   Height:         Body mass index is 52.02 kg/m².  Flowsheet Rows      Flowsheet Row First Filed Value   Admission Height 175.3 cm (69\") Documented at 03/26/2024 0636   Admission Weight 159 kg (350 lb) Documented at 03/26/2024 0636             General:    No acute distress, alert and oriented x4, pleasant                   Head:    Normocephalic, atraumatic.   Eyes:          Conjunctivae and sclerae normal, no icterus, PERRLA   Throat:   No oral lesions, no thrush, oral mucosa moist.    Neck:   Supple, trachea midline.   Lungs:     Clear to auscultation bilaterally     Heart:    Regular rhythm and normal rate.  No murmurs, gallops, or rubs noted.   Abdomen:     Soft, non-tender, non-distended, positive bowel sounds.    Extremities:   No clubbing, cyanosis, or edema.     Pulses:   Pulses palpable and equal bilaterally.    Skin:   No bleeding or rash.   Neuro:   Non-focal.  Moves all extremities well.    Psychiatric:   Normal mood and affect.                 Lab Review:                Results from last 7 days   Lab Units 03/26/24  0651   SODIUM mmol/L 138   POTASSIUM mmol/L 4.8   CHLORIDE mmol/L 100   CO2 mmol/L 29.0   BUN mg/dL 21*   CREATININE mg/dL 1.04   GLUCOSE mg/dL 168*   CALCIUM mg/dL 9.3     Results from last 7 days   Lab Units 03/26/24  0651   HSTROP T ng/L 16     Results from last 7 days   Lab Units 03/26/24  0651   WBC 10*3/mm3 10.37   HEMOGLOBIN g/dL 16.1   HEMATOCRIT % 48.4   PLATELETS 10*3/mm3 223                       EKG (reviewed by me personally):      Assessment:        Plan:             "

## 2024-03-26 NOTE — CONSULTS
Josh Spring   48 y.o.  male    LOS: 0 days   Patient Care Team:  Provider, No Known as PCP - General      Subjective     Chief Complaint:    History of Present Illness:     48-year-old white male patient who been followed by my colleague Dr.Arun Alberto is admitted with acute on chronic hypoxic respiratory failure.  Patient has COPD and sleep apnea and on CPAP at night.  He also complained of pleuritic pain on my On the right side on a deep breath.  CT angiogram of the chest showed no pulmonary embolism cardiomegaly noted.  He has history of acute on chronic diastolic heart failure.  Patient has marked obesity.  He was a smoker but quit about 2 weeks ago.  He used to do construction work.  Lives alone.  Echocardiogram on 3/7/2024 showed ejection fraction more than 70%.  Mild tricuspid regurgitation noted which was insufficient to measure RV systolic pressure.  He has hypertension hyperlipidemia.  He had a heart catheterization in the past which apparently showed no blockages.      Past Medical History:   Diagnosis Date    COPD (chronic obstructive pulmonary disease)     Hypertension      History reviewed. No pertinent surgical history.  Medications Prior to Admission   Medication Sig Dispense Refill Last Dose    Fluticasone-Umeclidin-Vilant (Trelegy Ellipta) 200-62.5-25 MCG/ACT inhaler Inhale 1 puff Daily. 60 each 0     furosemide (Lasix) 40 MG tablet Take 1 tablet by mouth Daily. 30 tablet 0     ipratropium-albuterol (DUO-NEB) 0.5-2.5 mg/3 ml nebulizer Take 3 mL by nebulization Every 4 (Four) Hours As Needed for Wheezing.       lisinopril (PRINIVIL,ZESTRIL) 10 MG tablet Take 1 tablet by mouth Daily. 30 tablet 0     nystatin (MYCOSTATIN) 100,000 unit/mL suspension Swish and swallow 5 mL 4 (Four) Times a Day.          History reviewed. No pertinent family history.  Social History     Socioeconomic History    Marital status: Single   Tobacco Use    Smoking status: Every Day     Current packs/day: 1.00     Types:  Cigarettes   Vaping Use    Vaping status: Never Used   Substance and Sexual Activity    Alcohol use: Never    Drug use: Yes     Types: Marijuana    Sexual activity: Defer     Objective       Review of Systems:   Constitutional: Negative for diaphoresis, fatigue, fever and unexpected weight change.   HENT: Negative.    Eyes: Negative.    Respiratory: Negative for cough, shortness of breath and wheezing.    Cardiovascular: Negative for chest pain, palpitations and leg swelling.   Gastrointestinal: Negative for abdominal pain, blood in stool, constipation, diarrhea, nausea and vomiting.   Endocrine: Negative.    Genitourinary: Negative for difficulty urinating, dysuria and frequency.   Musculoskeletal: Negative.    Skin: Negative.    Allergic/Immunologic: Negative for environmental allergies and food allergies.   Neurological: Negative.    Hematological: Negative.    Psychiatric/Behavioral: Negative.        Current Facility-Administered Medications:     cefdinir (OMNICEF) capsule 300 mg, 300 mg, Oral, Q12H, Dougie Lang MD, 300 mg at 03/26/24 1527    furosemide (LASIX) tablet 40 mg, 40 mg, Oral, Daily, Eduardo Luis MD, 40 mg at 03/26/24 1527    HYDROcodone-acetaminophen (NORCO) 5-325 MG per tablet 1 tablet, 1 tablet, Oral, Q6H PRN, Eduardo Luis MD    ipratropium-albuterol (DUO-NEB) nebulizer solution 3 mL, 3 mL, Nebulization, 4x Daily - RT, Dougie Lang MD, 3 mL at 03/26/24 1516    ipratropium-albuterol (DUO-NEB) nebulizer solution 3 mL, 3 mL, Nebulization, Q4H PRN, Eduardo Luis MD    lisinopril (PRINIVIL,ZESTRIL) tablet 10 mg, 10 mg, Oral, Q24H, Eduardo Luis MD, 10 mg at 03/26/24 1528    morphine injection 4 mg, 4 mg, Intravenous, Q4H PRN, Eduardo Luis MD, 4 mg at 03/26/24 1609    nystatin (MYCOSTATIN) 100,000 unit/mL suspension 500,000 Units, 500,000 Units, Swish & Swallow, 4x Daily, Eduardo Luis MD    predniSONE (DELTASONE) tablet 40 mg, 40 mg, Oral, Daily With Breakfast, Dougie Lang MD, 40  "mg at 03/26/24 1527    sodium chloride 0.9 % flush 10 mL, 10 mL, Intravenous, PRN, Robbie Daniel MD      Physical Exam:   Vital Sign Min/Max for last 24 hours  Temp  Min: 98.3 °F (36.8 °C)  Max: 99.7 °F (37.6 °C)   BP  Min: 121/70  Max: 143/88    Pulse  Min: 75  Max: 106     Wt Readings from Last 3 Encounters:   03/26/24 (!) 160 kg (352 lb 4.7 oz)   03/13/24 (!) 159 kg (349 lb 12.8 oz)       General Appearance:  Awake,  Alert, cooperative, in no acute distress   Head:  Normocephalic, without obvious abnormality, atraumatic   Eyes:          Conjunctivae normal, anicteric, eom intact    Neck: No adenopathy, supple, trachea midline, no thyromegaly, no   carotid bruit, no JVD, no elevated cvp   Lungs:   Clear to auscultation,respirations regular, even and                  unlabored    Heart:  Regular rhythm and normal rate, normal S1 and S2,            No murmur, no gallop, no rub, no click    Chest Wall:  No abnormalities observed   Abdomen:   Normal bowel sounds, soft nontender, nondistended                    Rectal:   Deferred   Extremities: No edema. Moves all extremities well, no cyanosis, no erythema   Pulses: Pulses palpable and equal bilaterally   Skin: No bleeding, bruising or rash   Neurologic: Speech clear and appropriate, no facial drooping     :       MONITOR:    Results Review:     Sodium Sodium   Date Value Ref Range Status   03/26/2024 138 136 - 145 mmol/L Final      Potassium Potassium   Date Value Ref Range Status   03/26/2024 4.8 3.5 - 5.2 mmol/L Final     Comment:     Specimen hemolyzed.  Result may be falsely elevated.      Chloride Chloride   Date Value Ref Range Status   03/26/2024 100 98 - 107 mmol/L Final      Bicarbonate No results found for: \"PLASMABICARB\"   BUN BUN   Date Value Ref Range Status   03/26/2024 21 (H) 6 - 20 mg/dL Final      Creatinine Creatinine   Date Value Ref Range Status   03/26/2024 1.04 0.76 - 1.27 mg/dL Final      Calcium Calcium   Date Value Ref Range Status " "  03/26/2024 9.3 8.6 - 10.5 mg/dL Final      Magnesium No results found for: \"MG\"     Results from last 7 days   Lab Units 03/26/24  0651   WBC 10*3/mm3 10.37   HEMOGLOBIN g/dL 16.1   HEMATOCRIT % 48.4   PLATELETS 10*3/mm3 223     Lab Results   Lab Value Date/Time    TROPONINT 16 03/26/2024 0651    TROPONINT 44 (H) 03/07/2024 1742    TROPONINT 38 (H) 03/07/2024 1410     No results found for: \"CHOL\"  No results found for: \"HDL\"  No results found for: \"LDL\"  No results found for: \"TRIG\"  No components found for: \"CHOLHDL\"  No results found for: \"PTT\"  No components found for: \"PT/INR\"  No results found for: \"HGBA1C\"       Echo EF Estimated  )No results found for: \"ECHOEFEST\"      Assessment/ Plan    Active Hospital Problems    Diagnosis  POA    **Acute on chronic respiratory failure with hypoxia and hypercapnia [J96.21, J96.22]  Yes    Pleuritic chest pain [R07.81]  Unknown     Sleep apnea  Obesity  Hypertension  Hyperlipidemia  Will treated with IV diuretics  When more stable right heart catheterization will be considered  We will need to ask [place] cardiology to do the procedure        Puneet Xavier MD  03/26/24  16:26 EDT    Discussed with  Time:        "

## 2024-03-26 NOTE — H&P
HISTORY AND PHYSICAL   [facility]        Patient Identification:  Name: Josh Hinojosa  Age: 48 y.o.  Sex: male  :  1975  MRN: [id number]                     Primary Care Physician: Provider, No Known    Chief Complaint: Pleuritic left chest and flank pain with shortness of air    History of Present Illness:      The patient  is a 48 y.o. male with a medical history of COPD, CHF, chronic hypoxic respiratory failure who presents to the ED c/o acute pain in his left lateral chest/flank.  Pain began 2 days ago and is intermittent.  Pain is sharp and stabbing.  Pain is worse with taking deep breaths and with movement.  Patient denies recent injury.  He was admitted here earlier this month for pneumonia, CHF, COPD, and respiratory failure.  He is on 2 L of oxygen at all times.  He still has a mild nonproductive cough.  Denies fever, chills, worsening shortness of breath, abdominal pain, leg pain, leg swelling, palpitations, dizziness, or syncope.  Denies history of CAD or PE/DVT.  The patient had recently been in the hospital had human metapneumovirus respiratory infection.    Past Medical History:  Past Medical History:   Diagnosis Date    COPD (chronic obstructive pulmonary disease)     Hypertension      Past Surgical History:  History reviewed. No pertinent surgical history.   Home Meds:  Medications Prior to Admission   Medication Sig Dispense Refill Last Dose    Fluticasone-Umeclidin-Vilant (Trelegy Ellipta) 200-62.5-25 MCG/ACT inhaler Inhale 1 puff Daily. 60 each 0     furosemide (Lasix) 40 MG tablet Take 1 tablet by mouth Daily. 30 tablet 0     ipratropium-albuterol (DUO-NEB) 0.5-2.5 mg/3 ml nebulizer Take 3 mL by nebulization Every 4 (Four) Hours As Needed for Wheezing.       lisinopril (PRINIVIL,ZESTRIL) 10 MG tablet Take 1 tablet by mouth Daily. 30 tablet 0     nystatin (MYCOSTATIN) 100,000 unit/mL suspension Swish and swallow 5 mL 4 (Four) Times a Day.        Current meds    Current  Facility-Administered Medications:     cefdinir (OMNICEF) capsule 300 mg, 300 mg, Oral, Q12H, Dougie Lang MD    furosemide (LASIX) tablet 40 mg, 40 mg, Oral, Daily, Eduardo Luis MD    ipratropium-albuterol (DUO-NEB) nebulizer solution 3 mL, 3 mL, Nebulization, 4x Daily - RT, Dougie Lang MD, 3 mL at 03/26/24 1113    ipratropium-albuterol (DUO-NEB) nebulizer solution 3 mL, 3 mL, Nebulization, Q4H PRN, Eduardo Luis MD    lisinopril (PRINIVIL,ZESTRIL) tablet 10 mg, 10 mg, Oral, Q24H, Eduardo Luis MD    predniSONE (DELTASONE) tablet 40 mg, 40 mg, Oral, Daily With Breakfast, Dougie Lang MD    sodium chloride 0.9 % flush 10 mL, 10 mL, Intravenous, PRN, Robbie Daniel MD  Allergies:  No Known Allergies  Immunizations:  Immunization History   Administered Date(s) Administered    COVID-19 (PFIZER) Purple Cap Monovalent 08/03/2021, 12/29/2021     Social History:   Social History     Social History Narrative    Not on file     Social History     Socioeconomic History    Marital status: Single   Tobacco Use    Smoking status: Every Day     Current packs/day: 1.00     Types: Cigarettes   Vaping Use    Vaping status: Never Used   Substance and Sexual Activity    Alcohol use: Never    Drug use: Yes     Types: Marijuana    Sexual activity: Defer       Family History:  History reviewed. No pertinent family history.     Review of Systems  See history of present illness and past medical history.  Patient denies headache, dizziness, syncope, falls, trauma, change in vision, change in hearing, change in taste, changes in weight, changes in appetite, focal weakness, numbness, or paresthesia.  Patient denies chest pain, palpitations, dyspnea, orthopnea, PND, cough, sinus pressure, rhinorrhea, epistaxis, hemoptysis, nausea, vomiting, hematemesis, diarrhea, constipation or hematochezia. Denies cold or heat intolerance, polydipsia, polyuria, polyphagia. Denies hematuria, pyuria, dysuria, hesitancy, frequency or  "urgency.   Denies fever, chills, sweats, night sweats.  Denies missing any routine medications. Remainder of ROS is negative.    Objective:  tMax 24 hrs: Temp (24hrs), Av °F (37.2 °C), Min:98.3 °F (36.8 °C), Max:99.7 °F (37.6 °C)    Vitals Ranges:   Temp:  [98.3 °F (36.8 °C)-99.7 °F (37.6 °C)] 98.3 °F (36.8 °C)  Heart Rate:  [] 75  Resp:  [16-22] 19  BP: (121-142)/(70-89) 142/84      Exam:  /84 (BP Location: Left arm, Patient Position: Sitting)   Pulse 75   Temp 98.3 °F (36.8 °C) (Oral)   Resp 19   Ht 175.3 cm (69\")   Wt (!) 160 kg (352 lb 4.7 oz)   SpO2 91%   BMI 52.02 kg/m²     General Appearance:    Alert, cooperative, no distress, appears stated age   Head:    Normocephalic, without obvious abnormality, atraumatic   Eyes:    PERRL, conjunctivae/corneas clear, EOM's intact, both eyes   Ears:    Normal external ear canals, both ears   Nose:   Nares normal, septum midline, mucosa normal, no drainage    or sinus tenderness   Throat:   Lips, mucosa, and tongue normal   Neck:   Supple, symmetrical, trachea midline, no adenopathy;     thyroid:  no enlargement/tenderness/nodules; no carotid    bruit or JVD   Back:     Symmetric, no curvature, ROM normal, no CVA tenderness   Lungs:     Clear to auscultation bilaterally, respirations unlabored   Chest Wall:    No tenderness or deformity    Heart:    Regular rate and rhythm, S1 and S2 normal, no murmur, rub   or gallop   Abdomen:     Soft, nontender, bowel sounds active all four quadrants,     no masses, no hepatomegaly, no splenomegaly   Extremities:   Extremities normal, atraumatic, no cyanosis or edema   Pulses:   2+ and symmetric all extremities   Skin:   Skin color, texture, turgor normal, no rashes or lesions   Lymph nodes:   Cervical, supraclavicular, and axillary nodes normal   Neurologic:   CNII-XII intact, normal strength, sensation intact throughout      .    Data Review:  Lab Results (last 72 hours)       Procedure Component Value Units " "Date/Time    Procalcitonin [[id number]]  (Normal) Collected: 03/26/24 0651    Specimen: Blood Updated: 03/26/24 1010     Procalcitonin 0.06 ng/mL     Narrative:      As a Marker for Sepsis (Non-Neonates):    1. <0.5 ng/mL represents a low risk of severe sepsis and/or septic shock.  2. >2 ng/mL represents a high risk of severe sepsis and/or septic shock.    As a Marker for Lower Respiratory Tract Infections that require antibiotic therapy:    PCT on Admission    Antibiotic Therapy       6-12 Hrs later    >0.5                Strongly Recommended  >0.25 - <0.5        Recommended   0.1 - 0.25          Discouraged              Remeasure/reassess PCT  <0.1                Strongly Discouraged     Remeasure/reassess PCT    As 28 day mortality risk marker: \"Change in Procalcitonin Result\" (>80% or <=80%) if Day 0 (or Day 1) and Day 4 values are available. Refer to [email or web address]    Change in PCT <=80%  A decrease of PCT levels below or equal to 80% defines a positive change in PCT test result representing a higher risk for 28-day all-cause mortality of patients diagnosed with severe sepsis for septic shock.    Change in PCT >80%  A decrease of PCT levels of more than 80% defines a negative change in PCT result representing a lower risk for 28-day all-cause mortality of patients diagnosed with severe sepsis or septic shock.       Blood Gas, Arterial - [[id number]]  (Abnormal) Collected: 03/26/24 0824    Specimen: Arterial Blood Updated: 03/26/24 0829     Site Right Radial     Omar's Test Positive     pH, Arterial 7.384 pH units      pCO2, Arterial 54.4 mm Hg      pO2, Arterial 129.1 mm Hg      HCO3, Arterial 32.5 mmol/L      Base Excess, Arterial 5.3 mmol/L      Comment: Serial Number: [id number]:  [id number]        O2 Saturation, Arterial 98.8 %      CO2 Content 34.2 mmol/L      Barometric Pressure for Blood Gas 739.3000 mmHg      Modality NRB     Flow Rate 15.0000 lpm      Rate 16 Breaths/minute      " Hemodilution No     Device Comment Sat 88%, ID [id number]    Trosper Draw [[id number]] Collected: 03/26/24 0651    Specimen: Blood Updated: 03/26/24 0827    Narrative:      The following orders were created for panel order Trosper Draw.  Procedure                               Abnormality         Status                     ---------                               -----------         ------                     Green Top (Gel)[[id number]]                                  Final result               Lavender Top[[id number]]                                     Final result               Gold Top - SST[[id number]]                                                              Light Blue Top[[id number]]                                   Final result                 Please view results for these tests on the individual orders.    Green Top (Gel) [[id number]] Collected: 03/26/24 0651    Specimen: Blood Updated: 03/26/24 0800     Extra Tube Hold for add-ons.     Comment: Auto resulted.       Lavender Top [[id number]] Collected: 03/26/24 0651    Specimen: Blood Updated: 03/26/24 0800     Extra Tube hold for add-on     Comment: Auto resulted       Light Blue Top [[id number]] Collected: 03/26/24 0651    Specimen: Blood Updated: 03/26/24 0800     Extra Tube Hold for add-ons.     Comment: Auto resulted       Comprehensive Metabolic Panel [[id number]]  (Abnormal) Collected: 03/26/24 0651    Specimen: Blood Updated: 03/26/24 0727     Glucose 168 mg/dL      BUN 21 mg/dL      Creatinine 1.04 mg/dL      Sodium 138 mmol/L      Potassium 4.8 mmol/L      Comment: Specimen hemolyzed.  Result may be falsely elevated.        Chloride 100 mmol/L      CO2 29.0 mmol/L      Calcium 9.3 mg/dL      Total Protein 7.0 g/dL      Albumin 4.0 g/dL      ALT (SGPT) 34 U/L      Comment: Specimen hemolyzed.  Result may  be falsely elevated.        AST (SGOT) 21 U/L      Comment: Specimen hemolyzed.  Result may be falsely elevated.        Alkaline Phosphatase 58 U/L       Total Bilirubin 0.4 mg/dL      Globulin 3.0 gm/dL      A/G Ratio 1.3 g/dL      BUN/Creatinine Ratio 20.2     Anion Gap 9.0 mmol/L      eGFR 89.1 mL/min/1.73     Narrative:      GFR Normal >60  Chronic Kidney Disease <60  Kidney Failure <15      Single High Sensitivity Troponin T [[id number]]  (Normal) Collected: 03/26/24 0651    Specimen: Blood Updated: 03/26/24 0725     HS Troponin T 16 ng/L     Narrative:      High Sensitive Troponin T Reference Range:  <14.0 ng/L- Negative Female for AMI  <22.0 ng/L- Negative Male for AMI  >=14 - Abnormal Female indicating possible myocardial injury.  >=22 - Abnormal Male indicating possible myocardial injury.   Clinicians would have to utilize clinical acumen, EKG, Troponin, and serial changes to determine if it is an Acute Myocardial Infarction or myocardial injury due to an underlying chronic condition.         BNP [[id number]]  (Normal) Collected: 03/26/24 0651    Specimen: Blood Updated: 03/26/24 0724     proBNP 52.3 pg/mL     Narrative:      This assay is used as an aid in the diagnosis of individuals suspected of having heart failure. It can be used as an aid in the diagnosis of acute decompensated heart failure (ADHF) in patients presenting with signs and symptoms of ADHF to the emergency department (ED). In addition, NT-proBNP of <300 pg/mL indicates ADHF is not likely.    Age Range Result Interpretation  NT-proBNP Concentration (pg/mL:      <50             Positive            >450                   Gray                 300-450                    Negative             <300    50-75           Positive            >900                  Gray                300-900                  Negative            <300      >75             Positive            >1800                  Gray                300-1800                  Negative            <300    CBC & Differential [[id number]]  (Abnormal) Collected: 03/26/24 0651    Specimen: Blood Updated: 03/26/24 0706    Narrative:      The  following orders were created for panel order CBC & Differential.  Procedure                               Abnormality         Status                     ---------                               -----------         ------                     CBC Auto Differential[[id number]]        Abnormal            Final result                 Please view results for these tests on the individual orders.    CBC Auto Differential [[id number]]  (Abnormal) Collected: 03/26/24 0651    Specimen: Blood Updated: 03/26/24 0706     WBC 10.37 10*3/mm3      RBC 5.25 10*6/mm3      Hemoglobin 16.1 g/dL      Hematocrit 48.4 %      MCV 92.2 fL      MCH 30.7 pg      MCHC 33.3 g/dL      RDW 13.1 %      RDW-SD 44.2 fl      MPV 9.7 fL      Platelets 223 10*3/mm3      Neutrophil % 72.5 %      Lymphocyte % 17.6 %      Monocyte % 6.9 %      Eosinophil % 2.0 %      Basophil % 0.4 %      Immature Grans % 0.6 %      Neutrophils, Absolute 7.51 10*3/mm3      Lymphocytes, Absolute 1.83 10*3/mm3      Monocytes, Absolute 0.72 10*3/mm3      Eosinophils, Absolute 0.21 10*3/mm3      Basophils, Absolute 0.04 10*3/mm3      Immature Grans, Absolute 0.06 10*3/mm3      nRBC 0.0 /100 WBC                      Imaging Results (All)       Procedure Component Value Units Date/Time    CT Angiogram Chest [[id number]] Collected: 03/26/24 0947     Updated: 03/26/24 1003    Narrative:      CT ANGIOGRAM CHEST-     INDICATION: Pleuritic left chest pain. Hypoxia.     COMPARISON: None     TECHNIQUE:  Routine CTA chest with IV contrast. Coronal and sagittal reformats.  Three dimensional reconstructions. Radiation dose reduction techniques  were utilized, including automated exposure control and exposure  modulation based on body size.     FINDINGS:      Pulmonary arteries: Streak artifact from contrast in the venous system.  Suboptimal opacification of the pulmonary arterial system with the main  pulmonary artery measuring 138 Hounsfield units. Suboptimal evaluation  of segmental and  subsegmental pulmonary arteries. No pulmonary embolism  identified.     Chest wall: No lymphadenopathy.     Thyroid: Visualized thyroid is normal.     Mediastinum: Coronary artery atherosclerotic calcifications. Borderline  cardiomegaly. No pericardial effusion. Enlarged main pulmonary artery  measuring 4.8 cm. AP window lymph node is mildly prominent measuring 1  cm. Subcarinal lymph node is mildly larger measuring 1.3 cm.     Lungs/pleura: Trace bilateral pleural fluid. Patent central airways.  Posterior dependent bilateral subsegmental atelectasis. Bibasilar lung  opacities, with some volume loss, greatest in the lower lobes and to a  lesser extent in the inferior right middle lobe and lingula.     Upper abdomen: Hepatic steatosis suspected. Cholelithiasis.  Nonobstructing nephrolithiasis seen in the superior pole right kidney  measures 2 mm. Small fluid attenuating cyst in the right mid kidney.     Osseous structures: No destructive osseous lesions..       Impression:         1. No pulmonary embolism identified though there is suboptimal  opacification of the pulmonary arterial system and segmental and  subsegmental pulmonary arteries are poorly evaluated.  2. Enlarged main pulmonary artery, can be seen with pulmonary artery  hypertension.  3. Bibasilar lung opacities may represent partial atelectasis or  bronchopneumonia in the appropriate clinical setting.  4. Borderline cardiomegaly.  5. Hepatic steatosis.  6. Cholelithiasis     This report was finalized on 3/26/2024 10:00 AM by Dr. Mati Kirby M.D on Workstation: [id number]       XR Chest 1 View [[id number]] Collected: 03/26/24 0721     Updated: 03/26/24 0730    Narrative:      Portable chest radiograph     HISTORY: Shortness of air     TECHNIQUE: Single AP portable radiograph of the chest     COMPARISON: Multiple chest radiographs dating back to 3/9/2024       Impression:      FINDINGS AND IMPRESSION:  The lungs are hypoinflated. Evaluation is  suboptimal due to patient body  habitus. There is an absence of lung markings within the right lung apex  with curvilinear hyperdensity projecting through this area not  definitively seen on prior radiograph. Small pneumothorax cannot be  excluded and further evaluation with chest CT is recommended.     There is mild to moderate bibasal pulmonary opacification, left greater  than right, which has mildly worsened since 3/12/2024. Attention on  above-mentioned chest CT is recommended to further characterize. Cardiac  silhouette is accentuated by low lung volumes.     This report was finalized on 3/26/2024 7:27 AM by Dr. Dutch Balbuena M.D  on Workstation: [id number]             Past Medical History:   Diagnosis Date    COPD (chronic obstructive pulmonary disease)     Hypertension        Assessment:  Active Hospital Problems    Diagnosis  POA    **Acute on chronic respiratory failure with hypoxia and hypercapnia [J96.21, J96.22]  Yes    Pleuritic chest pain [R07.81]  Unknown      Resolved Hospital Problems   No resolved problems to display.       Plan:  Pulmonary and cardiology consults.  Continue with bronchodilators antibiotics and steroids.  Oxygen as needed.  Follow-up on labs and cultures.  Will check respiratory PCR panel.    Eduardo Luis MD  3/26/2024  14:39 EDT

## 2024-03-26 NOTE — CONSULTS
[facility]    Reason for consult: Acute hypoxemic respiratory failure    HPI:  Mr. Hinojosa is a 47yo male with mixed obstructive and restrictive lung disease. REcent PFT however, with preserved DLCO and lung volume but spirometry suggesting restriction.  He was just in hospital with acute exacerbation of copd/asthma and likely acute on chronic diastolic chf.  I saw him in the office after that.  He presented to the ER today with acute pain in his left flank/chest that began 2 days before and is intermittent, pain is sharp and stabbing. Worse with movement and deep breathing.  CT angio negative for PE but with bilateral lower lobe infiltrates.  He has some continued cough. Says he is not feeling short of breath at all, says he was falling asleep when he was desating in the ER and that's why he needed more oxygen than his usual 3-4 liters.     PMH  Tobacco abuse  Marijuana smoking  Chronic diastolic chf  Morbid obesity  Chronic hypercapnic respiratory failure  Mixed obstructive/restrictive lung disease    Social History     Socioeconomic History    Marital status: Single   Tobacco Use    Smoking status: Every Day     Current packs/day: 1.00     Types: Cigarettes   Vaping Use    Vaping status: Never Used   Substance and Sexual Activity    Alcohol use: Never    Drug use: Yes     Types: Marijuana    Sexual activity: Defer     History reviewed. No pertinent family history.  MEDS: reviewed as per chart  ALL: nkda  rOS: 12 point negative except as in HPI    Vital Sign Min/Max for last 24 hours  Temp  Min: 99.7 °F (37.6 °C)  Max: 99.7 °F (37.6 °C)   BP  Min: 121/70  Max: 131/89   Pulse  Min: 78  Max: 106   Resp  Min: 16  Max: 22   SpO2  Min: 85 %  Max: 95 %   Flow (L/min)  Min: 60  Max: 60   Weight  Min: 159 kg (350 lb)  Max: 159 kg (350 lb)     GEN:  , appears ill, obese, A&O x3  HEENT: PERRL, EOMI, no icterus, mmm, no JVD, trachea midline, neck supple  CHEST: decreased ae bilat, no wheezes, +bibasilar  crackles, no use of accessory muscles  CV: RRR, no m/g/r  ABD: soft, nt, nd +bs, no hepatosplenomegaly  EXT: no c/c/e, trace  SKIN: no rashes, no xanthomas, nl turgor, warm, dry  LYMPH: no palpable cervical or supraclavicular lymphadenopathy  NEURO: CN 2-12 intact and symmetric bilaterally  PSYCH: nl affect, nl orientation, nl judgment, nl mood  MSK: some kyphosis, 5/5 strength ue and le bilaterally    Labs: 3/26: reviewed:  7.384/54/129  Glucose 168  Bun 21  Cr 1.04  Bicarb 29  Probnp 52  Wbc 10.4  Hgb 16.1  Plts 223    Ct angio: reviewed as in dictated report    A/P:  Acute hypoxemic respiratory failure - HIs degree of hypoxemia is out of proportion to PFT -- repeat echo with bubble to eval for shunt?   PNeumonia -- could just be atelectasis on ct -- will treat with antibiotics for now  Flank/chest pain -- suspect MSK or pleuritic. --will treat for pleurisy with small dose steorids.   Mixed obstructive and restrictive lung disease  Chronic hypercapnic respiratory failure  Chronic diastolic chf -- last echo looked pretty good, consider RHC?  Morbid obesity  8.  SHARMILA

## 2024-03-26 NOTE — PLAN OF CARE
Patient received from ED, a/ox4, independent baseline. Patient on 60L/100% heated humidified high flow. Patient 89-92%, no stated SOB, not tachypnic. RN discussed with /pulm if best to upgrade patient to Critical care. Per pulm, patient stable at this time, continue to wean off oxygen, no need for critical care at this time. RT updated prior to patient coming to floor. Morphine ordered and given for pain. Cardiology saw patient--diurese with IV lasix, possible RHC at some point. Needs attended to during shift.    Problem: Adult Inpatient Plan of Care  Goal: Patient-Specific Goal (Individualized)  Outcome: Ongoing, Progressing     Problem: Adult Inpatient Plan of Care  Goal: Absence of Hospital-Acquired Illness or Injury  Intervention: Identify and Manage Fall Risk  Recent Flowsheet Documentation  Taken 3/26/2024 1500 by Gabriela Herring RN  Safety Promotion/Fall Prevention:   safety round/check completed   fall prevention program maintained   clutter free environment maintained   assistive device/personal items within reach   room organization consistent  Taken 3/26/2024 1400 by Gabriela Herring RN  Safety Promotion/Fall Prevention:   safety round/check completed   fall prevention program maintained   clutter free environment maintained   assistive device/personal items within reach   room organization consistent  Taken 3/26/2024 1335 by Gabriela Herring RN  Safety Promotion/Fall Prevention:   safety round/check completed   fall prevention program maintained   clutter free environment maintained   assistive device/personal items within reach   room organization consistent  Intervention: Prevent Skin Injury  Recent Flowsheet Documentation  Taken 3/26/2024 1335 by Gabriela Herring RN  Body Position: sitting up in bed  Intervention: Prevent and Manage VTE (Venous Thromboembolism) Risk  Recent Flowsheet Documentation  Taken 3/26/2024 1335 by Gabriela Herring RN  Activity Management: bedrest  Range of  Motion: active ROM (range of motion) encouraged  Intervention: Prevent Infection  Recent Flowsheet Documentation  Taken 3/26/2024 1500 by Gabriela Herring RN  Infection Prevention: hand hygiene promoted  Taken 3/26/2024 1400 by Gabriela Herring RN  Infection Prevention: hand hygiene promoted  Taken 3/26/2024 1335 by Gabriela Herring RN  Infection Prevention: hand hygiene promoted  Goal: Optimal Comfort and Wellbeing  Intervention: Provide Person-Centered Care  Recent Flowsheet Documentation  Taken 3/26/2024 1335 by Gabriela Herring RN  Trust Relationship/Rapport:   care explained   choices provided   thoughts/feelings acknowledged   Goal Outcome Evaluation:

## 2024-03-26 NOTE — OUTREACH NOTE
Medical Week 3 Survey      Flowsheet Row Responses   [facility] patient discharged from? [facility]   Does the patient have one of the following disease processes/diagnoses(primary or secondary)? Other   Week 3 attempt successful? No   Unsuccessful attempts Attempt 1   Revoke Readmitted            Iveth MONTOYA - Registered Nurse

## 2024-03-26 NOTE — ED PROVIDER NOTES
EMERGENCY DEPARTMENT ENCOUNTER    Room Number:  10/10  PCP: Provider, No Known  Historian: Patient      HPI:  Chief Complaint: Chest/flank pain  A complete HPI/ROS/PMH/PSH/SH/FH are unobtainable due to: Nothing  Context: Josh Hinojosa is a 48 y.o. male with a medical history of COPD, CHF, chronic hypoxic respiratory failure who presents to the ED c/o acute pain in his left lateral chest/flank.  Pain began 2 days ago and is intermittent.  Pain is sharp and stabbing.  Pain is worse with taking deep breaths and with movement.  Patient denies recent injury.  He was admitted here earlier this month for pneumonia, CHF, COPD, and respiratory failure.  He is on 2 L of oxygen at all times.  He still has a mild nonproductive cough.  Denies fever, chills, worsening shortness of breath, abdominal pain, leg pain, leg swelling, palpitations, dizziness, or syncope.  Denies history of CAD or PE/DVT.            PAST MEDICAL HISTORY  Active Ambulatory Problems     Diagnosis Date Noted    Acute hypoxemic respiratory failure 03/07/2024     Resolved Ambulatory Problems     Diagnosis Date Noted    No Resolved Ambulatory Problems     No Additional Past Medical History         PAST SURGICAL HISTORY  History reviewed. No pertinent surgical history.      FAMILY HISTORY  History reviewed. No pertinent family history.      SOCIAL HISTORY  Social History     Socioeconomic History    Marital status: Single   Tobacco Use    Smoking status: Every Day     Current packs/day: 1.00     Types: Cigarettes   Vaping Use    Vaping status: Never Used   Substance and Sexual Activity    Alcohol use: Never    Drug use: Yes     Types: Marijuana    Sexual activity: Defer         ALLERGIES  Patient has no known allergies.    REVIEW OF SYSTEMS  Review of Systems  Included in HPI  All systems reviewed and negative except for those discussed in HPI.      PHYSICAL EXAM  ED Triage Vitals [03/26/24 0636]   Temp Heart Rate Resp BP SpO2   99.7 °F (37.6 °C) 106 22 -- (!)  89 %      Temp src Heart Rate Source Patient Position BP Location FiO2 (%)   -- Monitor -- -- --       Physical Exam      GENERAL: Awake, alert, oriented x 3.  Well-developed nontoxic-appearing male.  Appears uncomfortable.  BMI 51.7.    HENT: NCAT, nares patent  EYES: no scleral icterus  CV: regular rhythm, normal rate  RESPIRATORY: normal effort, diminished breath sounds in both lung bases  ABDOMEN: soft, nontender, no CVA tenderness  MUSCULOSKELETAL: There is tenderness of the left lateral inferior ribs.  No crepitus.  Extremities are nontender with full range of motion.  No calf tenderness  NEURO: Speech is normal.  No facial droop.  Follows commands  PSYCH:  calm, cooperative  SKIN: warm, dry    Vital signs and nursing notes reviewed.          LAB RESULTS  Recent Results (from the past 24 hour(s))   ECG 12 Lead ED Triage Standing Order; SOA    Collection Time: 03/26/24  6:44 AM   Result Value Ref Range    QT Interval 381 ms    QTC Interval 482 ms   Comprehensive Metabolic Panel    Collection Time: 03/26/24  6:51 AM    Specimen: Blood   Result Value Ref Range    Glucose 168 (H) 65 - 99 mg/dL    BUN 21 (H) 6 - 20 mg/dL    Creatinine 1.04 0.76 - 1.27 mg/dL    Sodium 138 136 - 145 mmol/L    Potassium 4.8 3.5 - 5.2 mmol/L    Chloride 100 98 - 107 mmol/L    CO2 29.0 22.0 - 29.0 mmol/L    Calcium 9.3 8.6 - 10.5 mg/dL    Total Protein 7.0 6.0 - 8.5 g/dL    Albumin 4.0 3.5 - 5.2 g/dL    ALT (SGPT) 34 1 - 41 U/L    AST (SGOT) 21 1 - 40 U/L    Alkaline Phosphatase 58 39 - 117 U/L    Total Bilirubin 0.4 0.0 - 1.2 mg/dL    Globulin 3.0 gm/dL    A/G Ratio 1.3 g/dL    BUN/Creatinine Ratio 20.2 7.0 - 25.0    Anion Gap 9.0 5.0 - 15.0 mmol/L    eGFR 89.1 >60.0 mL/min/1.73   BNP    Collection Time: 03/26/24  6:51 AM    Specimen: Blood   Result Value Ref Range    proBNP 52.3 0.0 - 450.0 pg/mL   Single High Sensitivity Troponin T    Collection Time: 03/26/24  6:51 AM    Specimen: Blood   Result Value Ref Range    HS Troponin T 16  <22 ng/L   Green Top (Gel)    Collection Time: 03/26/24  6:51 AM   Result Value Ref Range    Extra Tube Hold for add-ons.    Lavender Top    Collection Time: 03/26/24  6:51 AM   Result Value Ref Range    Extra Tube hold for add-on    Light Blue Top    Collection Time: 03/26/24  6:51 AM   Result Value Ref Range    Extra Tube Hold for add-ons.    CBC Auto Differential    Collection Time: 03/26/24  6:51 AM    Specimen: Blood   Result Value Ref Range    WBC 10.37 3.40 - 10.80 10*3/mm3    RBC 5.25 4.14 - 5.80 10*6/mm3    Hemoglobin 16.1 13.0 - 17.7 g/dL    Hematocrit 48.4 37.5 - 51.0 %    MCV 92.2 79.0 - 97.0 fL    MCH 30.7 26.6 - 33.0 pg    MCHC 33.3 31.5 - 35.7 g/dL    RDW 13.1 12.3 - 15.4 %    RDW-SD 44.2 37.0 - 54.0 fl    MPV 9.7 6.0 - 12.0 fL    Platelets 223 140 - 450 10*3/mm3    Neutrophil % 72.5 42.7 - 76.0 %    Lymphocyte % 17.6 (L) 19.6 - 45.3 %    Monocyte % 6.9 5.0 - 12.0 %    Eosinophil % 2.0 0.3 - 6.2 %    Basophil % 0.4 0.0 - 1.5 %    Immature Grans % 0.6 (H) 0.0 - 0.5 %    Neutrophils, Absolute 7.51 (H) 1.70 - 7.00 10*3/mm3    Lymphocytes, Absolute 1.83 0.70 - 3.10 10*3/mm3    Monocytes, Absolute 0.72 0.10 - 0.90 10*3/mm3    Eosinophils, Absolute 0.21 0.00 - 0.40 10*3/mm3    Basophils, Absolute 0.04 0.00 - 0.20 10*3/mm3    Immature Grans, Absolute 0.06 (H) 0.00 - 0.05 10*3/mm3    nRBC 0.0 0.0 - 0.2 /100 WBC   Procalcitonin    Collection Time: 03/26/24  6:51 AM    Specimen: Blood   Result Value Ref Range    Procalcitonin 0.06 0.00 - 0.25 ng/mL   Blood Gas, Arterial -    Collection Time: 03/26/24  8:24 AM    Specimen: Arterial Blood   Result Value Ref Range    Site Right Radial     Omar's Test Positive     pH, Arterial 7.384 7.350 - 7.450 pH units    pCO2, Arterial 54.4 (H) 35.0 - 45.0 mm Hg    pO2, Arterial 129.1 (H) 80.0 - 100.0 mm Hg    HCO3, Arterial 32.5 (H) 22.0 - 28.0 mmol/L    Base Excess, Arterial 5.3 (H) 0.0 - 2.0 mmol/L    O2 Saturation, Arterial 98.8 (H) 92.0 - 98.5 %    CO2 Content 34.2 (H) 23  - 27 mmol/L    Barometric Pressure for Blood Gas 739.3000 mmHg    Modality NRB     Flow Rate 15.0000 lpm    Rate 16 Breaths/minute    Hemodilution No     Device Comment Sat 88%, ID [id number]        Ordered the above labs and reviewed the results.        RADIOLOGY  CT Angiogram Chest    Result Date: 3/26/2024  CT ANGIOGRAM CHEST-  INDICATION: Pleuritic left chest pain. Hypoxia.  COMPARISON: None  TECHNIQUE: Routine CTA chest with IV contrast. Coronal and sagittal reformats. Three dimensional reconstructions. Radiation dose reduction techniques were utilized, including automated exposure control and exposure modulation based on body size.  FINDINGS:  Pulmonary arteries: Streak artifact from contrast in the venous system. Suboptimal opacification of the pulmonary arterial system with the main pulmonary artery measuring 138 Hounsfield units. Suboptimal evaluation of segmental and subsegmental pulmonary arteries. No pulmonary embolism identified.  Chest wall: No lymphadenopathy.  Thyroid: Visualized thyroid is normal.  Mediastinum: Coronary artery atherosclerotic calcifications. Borderline cardiomegaly. No pericardial effusion. Enlarged main pulmonary artery measuring 4.8 cm. AP window lymph node is mildly prominent measuring 1 cm. Subcarinal lymph node is mildly larger measuring 1.3 cm.  Lungs/pleura: Trace bilateral pleural fluid. Patent central airways. Posterior dependent bilateral subsegmental atelectasis. Bibasilar lung opacities, with some volume loss, greatest in the lower lobes and to a lesser extent in the inferior right middle lobe and lingula.  Upper abdomen: Hepatic steatosis suspected. Cholelithiasis. Nonobstructing nephrolithiasis seen in the superior pole right kidney measures 2 mm. Small fluid attenuating cyst in the right mid kidney.  Osseous structures: No destructive osseous lesions..       1. No pulmonary embolism identified though there is suboptimal opacification of the pulmonary arterial system  and segmental and subsegmental pulmonary arteries are poorly evaluated. 2. Enlarged main pulmonary artery, can be seen with pulmonary artery hypertension. 3. Bibasilar lung opacities may represent partial atelectasis or bronchopneumonia in the appropriate clinical setting. 4. Borderline cardiomegaly. 5. Hepatic steatosis. 6. Cholelithiasis  This report was finalized on 3/26/2024 10:00 AM by Dr. Mati Kirby M.D on Workstation: [id number]      XR Chest 1 View    Result Date: 3/26/2024  Portable chest radiograph  HISTORY: Shortness of air  TECHNIQUE: Single AP portable radiograph of the chest  COMPARISON: Multiple chest radiographs dating back to 3/9/2024      FINDINGS AND IMPRESSION: The lungs are hypoinflated. Evaluation is suboptimal due to patient body habitus. There is an absence of lung markings within the right lung apex with curvilinear hyperdensity projecting through this area not definitively seen on prior radiograph. Small pneumothorax cannot be excluded and further evaluation with chest CT is recommended.  There is mild to moderate bibasal pulmonary opacification, left greater than right, which has mildly worsened since 3/12/2024. Attention on above-mentioned chest CT is recommended to further characterize. Cardiac silhouette is accentuated by low lung volumes.  This report was finalized on 3/26/2024 7:27 AM by Dr. Dutch Balbuena M.D on Workstation: [id number]       Ordered the above noted radiological studies. Reviewed by me in PACS.            PROCEDURES  Critical Care    Performed by: Robbie Daniel MD  Authorized by: Robbie Daniel MD    Critical care provider statement:     Critical care time (minutes):  31    Critical care time was exclusive of:  Separately billable procedures and treating other patients    Critical care was necessary to treat or prevent imminent or life-threatening deterioration of the following conditions:  Respiratory failure    Critical care was time spent  personally by me on the following activities:  Development of treatment plan with patient or surrogate, discussions with consultants, discussions with primary provider, evaluation of patient's response to treatment, examination of patient, obtaining history from patient or surrogate, ordering and performing treatments and interventions, ordering and review of laboratory studies, ordering and review of radiographic studies, pulse oximetry, re-evaluation of patient's condition and review of old charts    I assumed direction of critical care for this patient from another provider in my specialty: no      Care discussed with: admitting provider        OUTPATIENT MEDICATION MANAGEMENT:  Current Facility-Administered Medications Ordered in Epic   Medication Dose Route Frequency Provider Last Rate Last Admin    cefdinir (OMNICEF) capsule 300 mg  300 mg Oral Q12H Dougie Lang MD        ipratropium-albuterol (DUO-NEB) nebulizer solution 3 mL  3 mL Nebulization 4x Daily - RT Dougie Lang MD   3 mL at 03/26/24 1113    predniSONE (DELTASONE) tablet 40 mg  40 mg Oral Daily With Breakfast Dougie Lang MD        sodium chloride 0.9 % flush 10 mL  10 mL Intravenous PRN oRbbie Daniel MD         Current Outpatient Medications Ordered in Epic   Medication Sig Dispense Refill    Fluticasone-Umeclidin-Vilant (Trelegy Ellipta) 200-62.5-25 MCG/ACT inhaler Inhale 1 puff Daily. 60 each 0    furosemide (Lasix) 40 MG tablet Take 1 tablet by mouth Daily. 30 tablet 0    ipratropium-albuterol (DUO-NEB) 0.5-2.5 mg/3 ml nebulizer Take 3 mL by nebulization Every 4 (Four) Hours As Needed for Wheezing.      lisinopril (PRINIVIL,ZESTRIL) 10 MG tablet Take 1 tablet by mouth Daily. 30 tablet 0    nystatin (MYCOSTATIN) 100,000 unit/mL suspension Swish and swallow 5 mL 4 (Four) Times a Day.             MEDICATIONS GIVEN IN ER  Medications   sodium chloride 0.9 % flush 10 mL (has no administration in time range)   ipratropium-albuterol  (DUO-NEB) nebulizer solution 3 mL (3 mL Nebulization Given 3/26/24 1113)   predniSONE (DELTASONE) tablet 40 mg (has no administration in time range)   cefdinir (OMNICEF) capsule 300 mg (has no administration in time range)   ondansetron (ZOFRAN) injection 4 mg (4 mg Intravenous Given 3/26/24 0919)   morphine injection 4 mg (4 mg Intravenous Given 3/26/24 0920)   ipratropium-albuterol (DUO-NEB) nebulizer solution 3 mL (3 mL Nebulization Given 3/26/24 0736)   iopamidol (ISOVUE-370) 76 % injection 100 mL (95 mL Intravenous Given by Other 3/26/24 0928)                   MEDICAL DECISION MAKING, PROGRESS, and CONSULTS    All labs have been independently reviewed by me.  All radiology studies have been reviewed by me and I have also reviewed the radiology report.   EKG's independently viewed and interpreted by me.  Discussion below represents my analysis of pertinent findings related to patient's condition, differential diagnosis, treatment plan and final disposition.      Additional sources:    - Discussed/ obtained information from independent historians: None    - External (non-ED) record review: Patient was admitted here 3/7 through 3/13/2024 for acute hypoxemic and hypercapnic respiratory failure, acute on chronic CHF, pneumonia, human metapneumovirus infection, and COPD exacerbation.  He was treated with NIPPV.  He was discharged on cefdinir and 4 L of oxygen.  CTA of the chest, abdomen, and pelvis done in November 2023 were unremarkable.  There were no PE.    -Prescription drug monitoring program review:     N/A    - Chronic or social conditions impacting patient care (Social Determinants of Health): None          Orders placed during this visit:  Orders Placed This Encounter   Procedures    Critical Care    S. Pneumo Ag Urine or CSF - Urine, Urine, Clean Catch    Respiratory Culture - Sputum, Cough    XR Chest 1 View    CT Angiogram Chest    [place] Draw    Comprehensive Metabolic Panel    BNP    Single High  Sensitivity Troponin T    CBC Auto Differential    Blood Gas, Arterial -    Blood Gas, Arterial -    Blood Gas, Arterial -    Procalcitonin    Diet: Regular/House; Fluid Consistency: Thin (IDDSI 0)    Undress & Gown    Continuous Pulse Oximetry    Vital Signs    Pulmonology (on-call MD unless specified)    LHA (on-call MD unless specified) Details    Inpatient Pulmonology Consult    Inpatient Cardiology Consult    Oxygen Therapy- Nasal Cannula; Titrate 1-6 LPM Per SpO2; 88 - 92%    Oxygen Therapy- Heated High Flow Nasal Cannula; Titrate 30-60 LPM Per SpO2; 88 - 92%    Incentive Spirometry    NIPPV-IPPV / BIPAP / CPAP    ECG 12 Lead ED Triage Standing Order; SOA    Insert Peripheral IV    Initiate Observation Status    CBC & Differential    Green Top (Gel)    Lavender Top    Light Blue Top         Additional orders considered but not ordered:          Differential diagnosis includes, but is not limited to:    Differential diagnosis includes but is not limited to CHF, acute coronary syndrome, pulmonary embolism, pneumothorax, pneumonia, asthma/COPD, deconditioning, anemia, anxiety.         Independent interpretation of labs, radiology studies, and discussions with consultants:  ED Course as of 03/26/24 1224   Tue Mar 26, 2024   0710 EKG personally interpreted by me.  My personal interpretation is:          EKG time: 6:44 AM  Rhythm/Rate: Minus rhythm, rate 96  P waves and OK: LAE  QRS, axis: RBBB  ST and T waves: Inverted T waves in the anterior and inferior leads, minimal ST elevation in the lateral leads    Interpreted Contemporaneously by me, independently viewed  EKG is not significantly changed compared to prior EKG done on 3/7/2024   [WH]   0711 Patient's O2 sats were 83% on 2 L.  He was placed on a nonrebreather. [WH]   0720 Chest x-ray personally interpreted by me.  My personal interpretation is: There is cardiomegaly.  There is increased vascular congestion.  Both lung bases are obscured.  Possible small  bilateral pleural effusions. [WH]   0727 HS Troponin T: 16 [WH]   0727 proBNP: 52.3 [WH]   0727 BUN(!): 21 [WH]   0727 Creatinine: 1.04 [WH]   0738 Per the radiologist, chest x-ray shows hyperinflation of the lungs.  Lung markings are absent within the right lung apex.  Small pneumothorax cannot be excluded.  There is mild to moderate bibasilar pulmonary opacification, left greater than right, which is slightly worse than study done on 3/12/2024. [WH]   0740 ABG reviewed.  pH is normal.  pCO2 55.6.  pO2 72.5. [WH]   0759 Initial ABG was drawn from this patient but he had the wrong ID bracelet on.  Therefore, the ABG results appear in another patient's chart.  Repeat ABG has been ordered. [WH]   0800 Patient is a difficult stick.  Waiting for IV nurse to come to the ED. [WH]   0807 SpO2: 95 % [WH]   0910 IV nurse was able to place an IV. [WH]   0917 pH, Arterial: 7.384 [WH]   0917 pCO2, Arterial(!): 54.4 [WH]   0917 pO2, Arterial(!): 129.1 [WH]   0917 HCO3, Arterial(!): 32.5 [WH]   0917 Flow Rate: 15.0000 [WH]   0917 Modality: NRB [WH]   1010 CTA chest is negative for PE although there is suboptimal opacification of the segmental and subsegmental pulmonary arteries.  Main pulmonary artery is enlarged.  There are bibasilar opacities which may represent atelectasis or pneumonia.  There is borderline cardiomegaly. [WH]   1019 Case discussed with Dr. Dougie Lang, pulmonology, he is familiar with the patient.  Pertinent history, exam findings, test results, ED course, and diagnoses were discussed with him.  He will see the patient in consult. [WH]   1022 Patient reports his pain is better after morphine.  Oxygen saturation is currently 94% on the nonrebreather.  Patient will be placed on a high flow cannula.  Test results and plan for admission were discussed with him. [WH]   1022 Procalcitonin: 0.06 [WH]   1028 O2 sats would not get above 89% on 15 L high flow cannula.  Will try the patient on heated high flow oxygen  [WH]   1043 Case discussed with Dr. Luis and he agrees to admit the patient to a monitored bed.  Pertinent history, exam findings, test results, ED course, and pending pulmonary consultation were discussed with him. [WH]   1059 MDM: Patient presented to ED complaining of pleuritic left chest/left flank pain.  He was recently hospitalized for respiratory failure and pneumonia.  He is on 2 L of oxygen at baseline.  Here in the ED, he was hypoxic and was placed on a nonrebreather.  He was then eventually placed on Optiflow.  He was not tachycardic and denied worsening shortness of breath.  CTA of the chest was negative for central PE.  There was atelectasis/infiltrate in both lung bases.  However, white blood cell count and procalcitonin were normal.  Patient just completed a course of antibiotics.  I doubt he has recurrent pneumonia.  He was given a nebulizer treatment and IV pain medication.  Case was discussed with pulmonology and the hospitalist.  Patient will be admitted.  Critical care was performed on this patient. [WH]   1207 Dr. Dougie Lang is now at bedside. [WH]      ED Course User Index  [WH] Robbie Daniel MD         COMPLEXITY OF CARE  The patient requires admission.      DIAGNOSIS  Final diagnoses:   Acute on chronic respiratory failure with hypoxia and hypercapnia   Pleuritic chest pain         DISPOSITION  ADMISSION    Discussed treatment plan and reason for admission with pt/family and admitting physician.  Pt/family voiced understanding of the plan for admission for further testing/treatment as needed.               Latest Documented Vital Signs:  AS OF 12:24 EDT VITALS:    BP - 121/70  HR - 83  TEMP - 99.7 °F (37.6 °C)  O2 SATS - 93%            --    Please note that portions of this were completed with a voice recognition program.       Note Disclaimer: At [facility], we believe that sharing information builds trust and better relationships. You are receiving this note because you are  receiving care at [facility] or recently visited. It is possible you will see health information before a provider has talked with you about it. This kind of information can be easy to misunderstand. To help you fully understand what it means for your health, we urge you to discuss this note with your provider.             Robbie Daniel MD  03/26/24 1123       Robbie Daniel MD  03/26/24 9142

## 2024-03-27 LAB
ANION GAP SERPL CALCULATED.3IONS-SCNC: 8 MMOL/L (ref 5–15)
BACTERIA SPEC RESP CULT: NORMAL
BASOPHILS # BLD AUTO: 0.02 10*3/MM3 (ref 0–0.2)
BASOPHILS NFR BLD AUTO: 0.2 % (ref 0–1.5)
BUN SERPL-MCNC: 21 MG/DL (ref 6–20)
BUN/CREAT SERPL: 25.3 (ref 7–25)
CALCIUM SPEC-SCNC: 9.5 MG/DL (ref 8.6–10.5)
CHLORIDE SERPL-SCNC: 97 MMOL/L (ref 98–107)
CO2 SERPL-SCNC: 34 MMOL/L (ref 22–29)
CREAT SERPL-MCNC: 0.83 MG/DL (ref 0.76–1.27)
DEPRECATED RDW RBC AUTO: 43.3 FL (ref 37–54)
EGFRCR SERPLBLD CKD-EPI 2021: 108.6 ML/MIN/1.73
EOSINOPHIL # BLD AUTO: 0.04 10*3/MM3 (ref 0–0.4)
EOSINOPHIL NFR BLD AUTO: 0.3 % (ref 0.3–6.2)
ERYTHROCYTE [DISTWIDTH] IN BLOOD BY AUTOMATED COUNT: 12.9 % (ref 12.3–15.4)
GLUCOSE SERPL-MCNC: 132 MG/DL (ref 65–99)
GRAM STN SPEC: NORMAL
HCT VFR BLD AUTO: 47.4 % (ref 37.5–51)
HGB BLD-MCNC: 15.7 G/DL (ref 13–17.7)
IMM GRANULOCYTES # BLD AUTO: 0.06 10*3/MM3 (ref 0–0.05)
IMM GRANULOCYTES NFR BLD AUTO: 0.5 % (ref 0–0.5)
LYMPHOCYTES # BLD AUTO: 1.24 10*3/MM3 (ref 0.7–3.1)
LYMPHOCYTES NFR BLD AUTO: 10.6 % (ref 19.6–45.3)
MCH RBC QN AUTO: 30.4 PG (ref 26.6–33)
MCHC RBC AUTO-ENTMCNC: 33.1 G/DL (ref 31.5–35.7)
MCV RBC AUTO: 91.9 FL (ref 79–97)
MONOCYTES # BLD AUTO: 0.78 10*3/MM3 (ref 0.1–0.9)
MONOCYTES NFR BLD AUTO: 6.7 % (ref 5–12)
NEUTROPHILS NFR BLD AUTO: 81.7 % (ref 42.7–76)
NEUTROPHILS NFR BLD AUTO: 9.55 10*3/MM3 (ref 1.7–7)
NRBC BLD AUTO-RTO: 0 /100 WBC (ref 0–0.2)
PLATELET # BLD AUTO: 227 10*3/MM3 (ref 140–450)
PMV BLD AUTO: 10.3 FL (ref 6–12)
POTASSIUM SERPL-SCNC: 5 MMOL/L (ref 3.5–5.2)
RBC # BLD AUTO: 5.16 10*6/MM3 (ref 4.14–5.8)
SODIUM SERPL-SCNC: 139 MMOL/L (ref 136–145)
WBC NRBC COR # BLD AUTO: 11.69 10*3/MM3 (ref 3.4–10.8)

## 2024-03-27 PROCEDURE — 94664 DEMO&/EVAL PT USE INHALER: CPT

## 2024-03-27 PROCEDURE — 87205 SMEAR GRAM STAIN: CPT | Performed by: INTERNAL MEDICINE

## 2024-03-27 PROCEDURE — 25010000002 FUROSEMIDE PER 20 MG: Performed by: INTERNAL MEDICINE

## 2024-03-27 PROCEDURE — 94760 N-INVAS EAR/PLS OXIMETRY 1: CPT

## 2024-03-27 PROCEDURE — 94799 UNLISTED PULMONARY SVC/PX: CPT

## 2024-03-27 PROCEDURE — 94761 N-INVAS EAR/PLS OXIMETRY MLT: CPT

## 2024-03-27 PROCEDURE — 80048 BASIC METABOLIC PNL TOTAL CA: CPT | Performed by: HOSPITALIST

## 2024-03-27 PROCEDURE — 63710000001 PREDNISONE PER 1 MG: Performed by: INTERNAL MEDICINE

## 2024-03-27 PROCEDURE — 85025 COMPLETE CBC W/AUTO DIFF WBC: CPT | Performed by: HOSPITALIST

## 2024-03-27 RX ADMIN — NYSTATIN 500000 UNITS: 100000 SUSPENSION ORAL at 09:42

## 2024-03-27 RX ADMIN — IPRATROPIUM BROMIDE AND ALBUTEROL SULFATE 3 ML: .5; 3 SOLUTION RESPIRATORY (INHALATION) at 06:48

## 2024-03-27 RX ADMIN — Medication 10 ML: at 09:47

## 2024-03-27 RX ADMIN — NYSTATIN 500000 UNITS: 100000 SUSPENSION ORAL at 23:31

## 2024-03-27 RX ADMIN — IPRATROPIUM BROMIDE AND ALBUTEROL SULFATE 3 ML: .5; 3 SOLUTION RESPIRATORY (INHALATION) at 14:43

## 2024-03-27 RX ADMIN — IPRATROPIUM BROMIDE AND ALBUTEROL SULFATE 3 ML: .5; 3 SOLUTION RESPIRATORY (INHALATION) at 22:55

## 2024-03-27 RX ADMIN — FUROSEMIDE 40 MG: 10 INJECTION, SOLUTION INTRAMUSCULAR; INTRAVENOUS at 09:45

## 2024-03-27 RX ADMIN — CEFDINIR 300 MG: 300 CAPSULE ORAL at 09:43

## 2024-03-27 RX ADMIN — FUROSEMIDE 40 MG: 10 INJECTION, SOLUTION INTRAMUSCULAR; INTRAVENOUS at 18:00

## 2024-03-27 RX ADMIN — IPRATROPIUM BROMIDE AND ALBUTEROL SULFATE 3 ML: .5; 3 SOLUTION RESPIRATORY (INHALATION) at 10:33

## 2024-03-27 RX ADMIN — NYSTATIN 500000 UNITS: 100000 SUSPENSION ORAL at 14:11

## 2024-03-27 RX ADMIN — CEFDINIR 300 MG: 300 CAPSULE ORAL at 23:31

## 2024-03-27 RX ADMIN — PREDNISONE 40 MG: 20 TABLET ORAL at 09:42

## 2024-03-27 RX ADMIN — LISINOPRIL 10 MG: 10 TABLET ORAL at 09:43

## 2024-03-27 RX ADMIN — Medication 10 ML: at 23:31

## 2024-03-27 RX ADMIN — NYSTATIN 500000 UNITS: 100000 SUSPENSION ORAL at 18:00

## 2024-03-27 NOTE — CASE MANAGEMENT/SOCIAL WORK
Discharge Planning Assessment  [facility]     Patient Name: Josh Hinojosa  MRN: [id number]  Today's Date: 3/27/2024    Admit Date: 3/26/2024    Plan: Plan is home  Pt denies needs   Discharge Needs Assessment       Row Name 03/27/24 1251       Living Environment    People in Home alone    Current Living Arrangements home    Potentially Unsafe Housing Conditions none    In the past 12 months has the electric, gas, oil, or water company threatened to shut off services in your home? Pt Unable    Primary Care Provided by self    Provides Primary Care For no one    Family Caregiver if Needed sibling(s)    Quality of Family Relationships unable to assess       Transportation Needs    In the past 12 months, has lack of transportation kept you from medical appointments or from getting medications? no    In the past 12 months, has lack of transportation kept you from meetings, work, or from getting things needed for daily living? No       Food Insecurity    Within the past 12 months, you worried that your food would run out before you got the money to buy more. Pt Unable    Within the past 12 months, the food you bought just didn't last and you didn't have money to get more. Pt Unable       Transition Planning    Patient/Family Anticipates Transition to home    Patient/Family Anticipated Services at Transition none    Transportation Anticipated family or friend will provide       Discharge Needs Assessment    Equipment Currently Used at Home cpap;oxygen    Anticipated Changes Related to Illness none    Equipment Needed After Discharge none                   Discharge Plan       Row Name 03/27/24 1301       Plan    Plan Plan is home  Pt denies needs    Plan Comments CCP spoke to patient at bedside to discuss discharge planning.  CCP role explained  Face sheet verified.  Pt live in a one story home alone.  He is independent with ADL's.  He uses no DME to ambulate  He has oxygen and C PAP afrom [employer]  He has no history of  HH.  He has had rehab at [facility]  Pt denies needs  He plans home  CCP following                  Continued Care and Services - Admitted Since 3/26/2024    No active coordination exists for this encounter.       Selected Continued Care - Prior Encounters Includes continued care and service providers with selected services from prior encounters from 12/27/2023 to 3/27/2024      Discharged on 3/13/2024 Admission date: 3/7/2024 - Discharge disposition: Home or Self Care      Durable Medical Equipment       Service Provider Selected Services Address Phone Fax Patient Preferred    [employer] [facility] [employer] [facility] [address] [facility] [address] [facility] [id number] [id number] [phone] --                             Demographic Summary    No documentation.                  Functional Status    No documentation.                  Psychosocial    No documentation.                  Abuse/Neglect    No documentation.                  Legal    No documentation.                  Substance Abuse    No documentation.                  Patient Forms    No documentation.                     Katie Hennessy RN

## 2024-03-27 NOTE — PAYOR COMM NOTE
"Josh Hinojosa (48 y.o. Male)     PLEASE SEE ATTACHED FOR INPT AUTH   PT WAS OBS 3/26  INPT ON 3/27    PT ID  [id number]     PLEASE CALL IRMA BERGMAN RN/ DEPT @ [phone]  OR FAX  DEPARTMENT @  [phone]    THANK YOU   IRMA BERGMAN RN  [facility]          Date of Birth   1975    Social Security Number       Address   [address] [place] [id number]    Home Phone   [phone]    MRN   [id number]       Anabaptist   Scientologist    Marital Status   Single                            Admission Date   3/26/24    Admission Type   Emergency    Admitting Provider   Eduardo Luis MD    Attending Provider   Eduardo Luis MD    Department, Room/Bed   [facility] CORONARY [facility], N335/1       Discharge Date       Discharge Disposition       Discharge Destination                                 Attending Provider: Eduardo Luis MD    Allergies: No Known Allergies    Isolation: None   Infection: None   Code Status: CPR    Ht: 175.3 cm (69\")   Wt: 160 kg (352 lb 4.7 oz)    Admission Cmt: None   Principal Problem: Acute on chronic respiratory failure with hypoxia and hypercapnia [J96.21,J96.22]                   Active Insurance as of 3/26/2024       Primary Coverage       Payor Plan Insurance Group Employer/Plan Group    [facility] BY DEJAN [facility] BY DEJAN [id number]       Payor Plan Address Payor Plan Phone Number Payor Plan Fax Number Effective Dates    [address] [address]   1/1/2021 - None Entered    [facility] [phone]         Subscriber Name Subscriber Birth Date Member ID       JOSH HINOJOSA 1975 [id number]                     Emergency Contacts        (Rel.) Home Phone Work Phone Mobile Phone    elizabeth washington (Sister) -- -- [phone]              [place]: NPI [id number]  Tax ID [id number]     History & Physical        Eduardo Luis MD at 03/26/24 1438          HISTORY AND PHYSICAL   [facility]        Patient " Identification:  Name: Josh Hinojosa  Age: 48 y.o.  Sex: male  :  1975  MRN: [id number]                     Primary Care Physician: Provider, No Known    Chief Complaint: Pleuritic left chest and flank pain with shortness of air    History of Present Illness:      The patient  is a 48 y.o. male with a medical history of COPD, CHF, chronic hypoxic respiratory failure who presents to the ED c/o acute pain in his left lateral chest/flank.  Pain began 2 days ago and is intermittent.  Pain is sharp and stabbing.  Pain is worse with taking deep breaths and with movement.  Patient denies recent injury.  He was admitted here earlier this month for pneumonia, CHF, COPD, and respiratory failure.  He is on 2 L of oxygen at all times.  He still has a mild nonproductive cough.  Denies fever, chills, worsening shortness of breath, abdominal pain, leg pain, leg swelling, palpitations, dizziness, or syncope.  Denies history of CAD or PE/DVT.  The patient had recently been in the hospital had human metapneumovirus respiratory infection.    Past Medical History:  Past Medical History:   Diagnosis Date    COPD (chronic obstructive pulmonary disease)     Hypertension      Past Surgical History:  History reviewed. No pertinent surgical history.   Home Meds:  Medications Prior to Admission   Medication Sig Dispense Refill Last Dose    Fluticasone-Umeclidin-Vilant (Trelegy Ellipta) 200-62.5-25 MCG/ACT inhaler Inhale 1 puff Daily. 60 each 0     furosemide (Lasix) 40 MG tablet Take 1 tablet by mouth Daily. 30 tablet 0     ipratropium-albuterol (DUO-NEB) 0.5-2.5 mg/3 ml nebulizer Take 3 mL by nebulization Every 4 (Four) Hours As Needed for Wheezing.       lisinopril (PRINIVIL,ZESTRIL) 10 MG tablet Take 1 tablet by mouth Daily. 30 tablet 0     nystatin (MYCOSTATIN) 100,000 unit/mL suspension Swish and swallow 5 mL 4 (Four) Times a Day.        Current meds    Current Facility-Administered Medications:     cefdinir (OMNICEF) capsule 300  mg, 300 mg, Oral, Q12H, Dougie Lang MD    furosemide (LASIX) tablet 40 mg, 40 mg, Oral, Daily, Eduardo Luis MD    ipratropium-albuterol (DUO-NEB) nebulizer solution 3 mL, 3 mL, Nebulization, 4x Daily - RT, Dougie Lang MD, 3 mL at 03/26/24 1113    ipratropium-albuterol (DUO-NEB) nebulizer solution 3 mL, 3 mL, Nebulization, Q4H PRN, Eduardo Luis MD    lisinopril (PRINIVIL,ZESTRIL) tablet 10 mg, 10 mg, Oral, Q24H, Eduardo Luis MD    predniSONE (DELTASONE) tablet 40 mg, 40 mg, Oral, Daily With Breakfast, Dougie Lang MD    sodium chloride 0.9 % flush 10 mL, 10 mL, Intravenous, PRN, Robbie Daniel MD  Allergies:  No Known Allergies  Immunizations:  Immunization History   Administered Date(s) Administered    COVID-19 (PFIZER) Purple Cap Monovalent 08/03/2021, 12/29/2021     Social History:   Social History     Social History Narrative    Not on file     Social History     Socioeconomic History    Marital status: Single   Tobacco Use    Smoking status: Every Day     Current packs/day: 1.00     Types: Cigarettes   Vaping Use    Vaping status: Never Used   Substance and Sexual Activity    Alcohol use: Never    Drug use: Yes     Types: Marijuana    Sexual activity: Defer       Family History:  History reviewed. No pertinent family history.     Review of Systems  See history of present illness and past medical history.  Patient denies headache, dizziness, syncope, falls, trauma, change in vision, change in hearing, change in taste, changes in weight, changes in appetite, focal weakness, numbness, or paresthesia.  Patient denies chest pain, palpitations, dyspnea, orthopnea, PND, cough, sinus pressure, rhinorrhea, epistaxis, hemoptysis, nausea, vomiting, hematemesis, diarrhea, constipation or hematochezia. Denies cold or heat intolerance, polydipsia, polyuria, polyphagia. Denies hematuria, pyuria, dysuria, hesitancy, frequency or urgency.   Denies fever, chills, sweats, night sweats.  Denies missing any  "routine medications. Remainder of ROS is negative.    Objective:  tMax 24 hrs: Temp (24hrs), Av °F (37.2 °C), Min:98.3 °F (36.8 °C), Max:99.7 °F (37.6 °C)    Vitals Ranges:   Temp:  [98.3 °F (36.8 °C)-99.7 °F (37.6 °C)] 98.3 °F (36.8 °C)  Heart Rate:  [] 75  Resp:  [16-22] 19  BP: (121-142)/(70-89) 142/84      Exam:  /84 (BP Location: Left arm, Patient Position: Sitting)   Pulse 75   Temp 98.3 °F (36.8 °C) (Oral)   Resp 19   Ht 175.3 cm (69\")   Wt (!) 160 kg (352 lb 4.7 oz)   SpO2 91%   BMI 52.02 kg/m²     General Appearance:    Alert, cooperative, no distress, appears stated age   Head:    Normocephalic, without obvious abnormality, atraumatic   Eyes:    PERRL, conjunctivae/corneas clear, EOM's intact, both eyes   Ears:    Normal external ear canals, both ears   Nose:   Nares normal, septum midline, mucosa normal, no drainage    or sinus tenderness   Throat:   Lips, mucosa, and tongue normal   Neck:   Supple, symmetrical, trachea midline, no adenopathy;     thyroid:  no enlargement/tenderness/nodules; no carotid    bruit or JVD   Back:     Symmetric, no curvature, ROM normal, no CVA tenderness   Lungs:     Clear to auscultation bilaterally, respirations unlabored   Chest Wall:    No tenderness or deformity    Heart:    Regular rate and rhythm, S1 and S2 normal, no murmur, rub   or gallop   Abdomen:     Soft, nontender, bowel sounds active all four quadrants,     no masses, no hepatomegaly, no splenomegaly   Extremities:   Extremities normal, atraumatic, no cyanosis or edema   Pulses:   2+ and symmetric all extremities   Skin:   Skin color, texture, turgor normal, no rashes or lesions   Lymph nodes:   Cervical, supraclavicular, and axillary nodes normal   Neurologic:   CNII-XII intact, normal strength, sensation intact throughout      .    Data Review:  Lab Results (last 72 hours)       Procedure Component Value Units Date/Time    Procalcitonin [[id number]]  (Normal) Collected: 24 0651    " "Specimen: Blood Updated: 03/26/24 1010     Procalcitonin 0.06 ng/mL     Narrative:      As a Marker for Sepsis (Non-Neonates):    1. <0.5 ng/mL represents a low risk of severe sepsis and/or septic shock.  2. >2 ng/mL represents a high risk of severe sepsis and/or septic shock.    As a Marker for Lower Respiratory Tract Infections that require antibiotic therapy:    PCT on Admission    Antibiotic Therapy       6-12 Hrs later    >0.5                Strongly Recommended  >0.25 - <0.5        Recommended   0.1 - 0.25          Discouraged              Remeasure/reassess PCT  <0.1                Strongly Discouraged     Remeasure/reassess PCT    As 28 day mortality risk marker: \"Change in Procalcitonin Result\" (>80% or <=80%) if Day 0 (or Day 1) and Day 4 values are available. Refer to http://www.digedupct-calculator.com    Change in PCT <=80%  A decrease of PCT levels below or equal to 80% defines a positive change in PCT test result representing a higher risk for 28-day all-cause mortality of patients diagnosed with severe sepsis for septic shock.    Change in PCT >80%  A decrease of PCT levels of more than 80% defines a negative change in PCT result representing a lower risk for 28-day all-cause mortality of patients diagnosed with severe sepsis or septic shock.       Blood Gas, Arterial - [[id number]]  (Abnormal) Collected: 03/26/24 0824    Specimen: Arterial Blood Updated: 03/26/24 0829     Site Right Radial     Omar's Test Positive     pH, Arterial 7.384 pH units      pCO2, Arterial 54.4 mm Hg      pO2, Arterial 129.1 mm Hg      HCO3, Arterial 32.5 mmol/L      Base Excess, Arterial 5.3 mmol/L      Comment: Serial Number: [id number]:  [id number]        O2 Saturation, Arterial 98.8 %      CO2 Content 34.2 mmol/L      Barometric Pressure for Blood Gas 739.3000 mmHg      Modality NRB     Flow Rate 15.0000 lpm      Rate 16 Breaths/minute      Hemodilution No     Device Comment Sat 88%, ID [id number]    [place] Draw " [[id number]] Collected: 03/26/24 0651    Specimen: Blood Updated: 03/26/24 0827    Narrative:      The following orders were created for panel order [facility].  Procedure                               Abnormality         Status                     ---------                               -----------         ------                     Green Top (Gel)[[id number]]                                  Final result               Lavender Top[[id number]]                                     Final result               Gold Top - SST[[id number]]                                                              Light Blue Top[[id number]]                                   Final result                 Please view results for these tests on the individual orders.    Green Top (Gel) [[id number]] Collected: 03/26/24 0651    Specimen: Blood Updated: 03/26/24 0800     Extra Tube Hold for add-ons.     Comment: Auto resulted.       Lavender Top [[id number]] Collected: 03/26/24 0651    Specimen: Blood Updated: 03/26/24 0800     Extra Tube hold for add-on     Comment: Auto resulted       Light Blue Top [[id number]] Collected: 03/26/24 0651    Specimen: Blood Updated: 03/26/24 0800     Extra Tube Hold for add-ons.     Comment: Auto resulted       Comprehensive Metabolic Panel [[id number]]  (Abnormal) Collected: 03/26/24 0651    Specimen: Blood Updated: 03/26/24 0727     Glucose 168 mg/dL      BUN 21 mg/dL      Creatinine 1.04 mg/dL      Sodium 138 mmol/L      Potassium 4.8 mmol/L      Comment: Specimen hemolyzed.  Result may be falsely elevated.        Chloride 100 mmol/L      CO2 29.0 mmol/L      Calcium 9.3 mg/dL      Total Protein 7.0 g/dL      Albumin 4.0 g/dL      ALT (SGPT) 34 U/L      Comment: Specimen hemolyzed.  Result may  be falsely elevated.        AST (SGOT) 21 U/L      Comment: Specimen hemolyzed.  Result may be falsely elevated.        Alkaline Phosphatase 58 U/L      Total Bilirubin 0.4 mg/dL      Globulin 3.0 gm/dL      A/G Ratio 1.3  g/dL      BUN/Creatinine Ratio 20.2     Anion Gap 9.0 mmol/L      eGFR 89.1 mL/min/1.73     Narrative:      GFR Normal >60  Chronic Kidney Disease <60  Kidney Failure <15      Single High Sensitivity Troponin T [[id number]]  (Normal) Collected: 03/26/24 0651    Specimen: Blood Updated: 03/26/24 0725     HS Troponin T 16 ng/L     Narrative:      High Sensitive Troponin T Reference Range:  <14.0 ng/L- Negative Female for AMI  <22.0 ng/L- Negative Male for AMI  >=14 - Abnormal Female indicating possible myocardial injury.  >=22 - Abnormal Male indicating possible myocardial injury.   Clinicians would have to utilize clinical acumen, EKG, Troponin, and serial changes to determine if it is an Acute Myocardial Infarction or myocardial injury due to an underlying chronic condition.         BNP [[id number]]  (Normal) Collected: 03/26/24 0651    Specimen: Blood Updated: 03/26/24 0724     proBNP 52.3 pg/mL     Narrative:      This assay is used as an aid in the diagnosis of individuals suspected of having heart failure. It can be used as an aid in the diagnosis of acute decompensated heart failure (ADHF) in patients presenting with signs and symptoms of ADHF to the emergency department (ED). In addition, NT-proBNP of <300 pg/mL indicates ADHF is not likely.    Age Range Result Interpretation  NT-proBNP Concentration (pg/mL:      <50             Positive            >450                   Gray                 300-450                    Negative             <300    50-75           Positive            >900                  Gray                300-900                  Negative            <300      >75             Positive            >1800                  Gray                300-1800                  Negative            <300    CBC & Differential [[id number]]  (Abnormal) Collected: 03/26/24 0651    Specimen: Blood Updated: 03/26/24 0706    Narrative:      The following orders were created for panel order CBC &  Differential.  Procedure                               Abnormality         Status                     ---------                               -----------         ------                     CBC Auto Differential[[id number]]        Abnormal            Final result                 Please view results for these tests on the individual orders.    CBC Auto Differential [[id number]]  (Abnormal) Collected: 03/26/24 0651    Specimen: Blood Updated: 03/26/24 0706     WBC 10.37 10*3/mm3      RBC 5.25 10*6/mm3      Hemoglobin 16.1 g/dL      Hematocrit 48.4 %      MCV 92.2 fL      MCH 30.7 pg      MCHC 33.3 g/dL      RDW 13.1 %      RDW-SD 44.2 fl      MPV 9.7 fL      Platelets 223 10*3/mm3      Neutrophil % 72.5 %      Lymphocyte % 17.6 %      Monocyte % 6.9 %      Eosinophil % 2.0 %      Basophil % 0.4 %      Immature Grans % 0.6 %      Neutrophils, Absolute 7.51 10*3/mm3      Lymphocytes, Absolute 1.83 10*3/mm3      Monocytes, Absolute 0.72 10*3/mm3      Eosinophils, Absolute 0.21 10*3/mm3      Basophils, Absolute 0.04 10*3/mm3      Immature Grans, Absolute 0.06 10*3/mm3      nRBC 0.0 /100 WBC                      Imaging Results (All)       Procedure Component Value Units Date/Time    CT Angiogram Chest [[id number]] Collected: 03/26/24 0947     Updated: 03/26/24 1003    Narrative:      CT ANGIOGRAM CHEST-     INDICATION: Pleuritic left chest pain. Hypoxia.     COMPARISON: None     TECHNIQUE:  Routine CTA chest with IV contrast. Coronal and sagittal reformats.  Three dimensional reconstructions. Radiation dose reduction techniques  were utilized, including automated exposure control and exposure  modulation based on body size.     FINDINGS:      Pulmonary arteries: Streak artifact from contrast in the venous system.  Suboptimal opacification of the pulmonary arterial system with the main  pulmonary artery measuring 138 Hounsfield units. Suboptimal evaluation  of segmental and subsegmental pulmonary arteries. No pulmonary  embolism  identified.     Chest wall: No lymphadenopathy.     Thyroid: Visualized thyroid is normal.     Mediastinum: Coronary artery atherosclerotic calcifications. Borderline  cardiomegaly. No pericardial effusion. Enlarged main pulmonary artery  measuring 4.8 cm. AP window lymph node is mildly prominent measuring 1  cm. Subcarinal lymph node is mildly larger measuring 1.3 cm.     Lungs/pleura: Trace bilateral pleural fluid. Patent central airways.  Posterior dependent bilateral subsegmental atelectasis. Bibasilar lung  opacities, with some volume loss, greatest in the lower lobes and to a  lesser extent in the inferior right middle lobe and lingula.     Upper abdomen: Hepatic steatosis suspected. Cholelithiasis.  Nonobstructing nephrolithiasis seen in the superior pole right kidney  measures 2 mm. Small fluid attenuating cyst in the right mid kidney.     Osseous structures: No destructive osseous lesions..       Impression:         1. No pulmonary embolism identified though there is suboptimal  opacification of the pulmonary arterial system and segmental and  subsegmental pulmonary arteries are poorly evaluated.  2. Enlarged main pulmonary artery, can be seen with pulmonary artery  hypertension.  3. Bibasilar lung opacities may represent partial atelectasis or  bronchopneumonia in the appropriate clinical setting.  4. Borderline cardiomegaly.  5. Hepatic steatosis.  6. Cholelithiasis     This report was finalized on 3/26/2024 10:00 AM by Dr. Mati Kirby M.D on Workstation: [id number]       XR Chest 1 View [[id number]] Collected: 03/26/24 0721     Updated: 03/26/24 0730    Narrative:      Portable chest radiograph     HISTORY: Shortness of air     TECHNIQUE: Single AP portable radiograph of the chest     COMPARISON: Multiple chest radiographs dating back to 3/9/2024       Impression:      FINDINGS AND IMPRESSION:  The lungs are hypoinflated. Evaluation is suboptimal due to patient body  habitus. There is an  absence of lung markings within the right lung apex  with curvilinear hyperdensity projecting through this area not  definitively seen on prior radiograph. Small pneumothorax cannot be  excluded and further evaluation with chest CT is recommended.     There is mild to moderate bibasal pulmonary opacification, left greater  than right, which has mildly worsened since 3/12/2024. Attention on  above-mentioned chest CT is recommended to further characterize. Cardiac  silhouette is accentuated by low lung volumes.     This report was finalized on 3/26/2024 7:27 AM by Dr. Dutch Balbuena M.D  on Workstation: [employer]             Past Medical History:   Diagnosis Date    COPD (chronic obstructive pulmonary disease)     Hypertension        Assessment:  Active Hospital Problems    Diagnosis  POA    **Acute on chronic respiratory failure with hypoxia and hypercapnia [J96.21, J96.22]  Yes    Pleuritic chest pain [R07.81]  Unknown      Resolved Hospital Problems   No resolved problems to display.       Plan:  Pulmonary and cardiology consults.  Continue with bronchodilators antibiotics and steroids.  Oxygen as needed.  Follow-up on labs and cultures.  Will check respiratory PCR panel.    Eduardo Luis MD  3/26/2024  14:39 EDT     Electronically signed by Eduardo Luis MD at 03/26/24 1801          Emergency Department Notes        Eli Vallecillo, RN at 03/26/24 1123          Nursing report ED to floor  [facility]  48 y.o.  male    HPI :  HPI (Adult)  Stated Reason for Visit: soa for 3 days  History Obtained From: patient    Chief Complaint  Chief Complaint   Patient presents with    Shortness of Breath       Admitting doctor:   Eduardo Luis MD    Admitting diagnosis:   The primary encounter diagnosis was Acute on chronic respiratory failure with hypoxia and hypercapnia. A diagnosis of Pleuritic chest pain was also pertinent to this visit.    Code status:   Current Code Status       Date Active Code Status Order ID  Comments User Context       Prior            Allergies:   Patient has no known allergies.    Isolation:   Contact    Intake and Output  No intake or output data in the 24 hours ending 03/26/24 1123    Weight:       03/26/24  0636   Weight: (!) 159 kg (350 lb)       Most recent vitals:   Vitals:    03/26/24 1113 03/26/24 1117 03/26/24 1118 03/26/24 1119   BP:  121/70     Pulse: 78 85 88 82   Resp: 16      Temp:       SpO2: 95% 94% 90% 94%   Weight:       Height:           Active LDAs/IV Access:   Lines, Drains & Airways       Active LDAs       Name Placement date Placement time Site Days    Peripheral IV 03/26/24 0903 Right Antecubital 03/26/24  0903  Antecubital  less than 1                    Labs (abnormal labs have a star):   Labs Reviewed   COMPREHENSIVE METABOLIC PANEL - Abnormal; Notable for the following components:       Result Value    Glucose 168 (*)     BUN 21 (*)     All other components within normal limits    Narrative:     GFR Normal >60  Chronic Kidney Disease <60  Kidney Failure <15     CBC WITH AUTO DIFFERENTIAL - Abnormal; Notable for the following components:    Lymphocyte % 17.6 (*)     Immature Grans % 0.6 (*)     Neutrophils, Absolute 7.51 (*)     Immature Grans, Absolute 0.06 (*)     All other components within normal limits   BLOOD GAS, ARTERIAL - Abnormal; Notable for the following components:    pCO2, Arterial 54.4 (*)     pO2, Arterial 129.1 (*)     HCO3, Arterial 32.5 (*)     Base Excess, Arterial 5.3 (*)     O2 Saturation, Arterial 98.8 (*)     CO2 Content 34.2 (*)     All other components within normal limits   BNP (IN-HOUSE) - Normal    Narrative:     This assay is used as an aid in the diagnosis of individuals suspected of having heart failure. It can be used as an aid in the diagnosis of acute decompensated heart failure (ADHF) in patients presenting with signs and symptoms of ADHF to the emergency department (ED). In addition, NT-proBNP of <300 pg/mL indicates ADHF is not  "likely.    Age Range Result Interpretation  NT-proBNP Concentration (pg/mL:      <50             Positive            >450                   Gray                 300-450                    Negative             <300    50-75           Positive            >900                  Gray                300-900                  Negative            <300      >75             Positive            >1800                  Gray                300-1800                  Negative            <300   SINGLE HS TROPONIN T - Normal    Narrative:     High Sensitive Troponin T Reference Range:  <14.0 ng/L- Negative Female for AMI  <22.0 ng/L- Negative Male for AMI  >=14 - Abnormal Female indicating possible myocardial injury.  >=22 - Abnormal Male indicating possible myocardial injury.   Clinicians would have to utilize clinical acumen, EKG, Troponin, and serial changes to determine if it is an Acute Myocardial Infarction or myocardial injury due to an underlying chronic condition.        PROCALCITONIN - Normal    Narrative:     As a Marker for Sepsis (Non-Neonates):    1. <0.5 ng/mL represents a low risk of severe sepsis and/or septic shock.  2. >2 ng/mL represents a high risk of severe sepsis and/or septic shock.    As a Marker for Lower Respiratory Tract Infections that require antibiotic therapy:    PCT on Admission    Antibiotic Therapy       6-12 Hrs later    >0.5                Strongly Recommended  >0.25 - <0.5        Recommended   0.1 - 0.25          Discouraged              Remeasure/reassess PCT  <0.1                Strongly Discouraged     Remeasure/reassess PCT    As 28 day mortality risk marker: \"Change in Procalcitonin Result\" (>80% or <=80%) if Day 0 (or Day 1) and Day 4 values are available. Refer to http://www.Gun.ios-pct-calculator.com    Change in PCT <=80%  A decrease of PCT levels below or equal to 80% defines a positive change in PCT test result representing a higher risk for 28-day all-cause mortality of patients " diagnosed with severe sepsis for septic shock.    Change in PCT >80%  A decrease of PCT levels of more than 80% defines a negative change in PCT result representing a lower risk for 28-day all-cause mortality of patients diagnosed with severe sepsis or septic shock.      STREP PNEUMO AG, URINE OR CSF   RESPIRATORY CULTURE   RAINBOW DRAW    Narrative:     The following orders were created for panel order [place] Draw.  Procedure                               Abnormality         Status                     ---------                               -----------         ------                     Green Top (Gel)[[id number]]                                  Final result               Lavender Top[[id number]]                                     Final result               Gold Top - SST[[id number]]                                                              Light Blue Top[[id number]]                                   Final result                 Please view results for these tests on the individual orders.   BLOOD GAS, ARTERIAL   BLOOD GAS, ARTERIAL   CBC AND DIFFERENTIAL    Narrative:     The following orders were created for panel order CBC & Differential.  Procedure                               Abnormality         Status                     ---------                               -----------         ------                     CBC Auto Differential[[id number]]        Abnormal            Final result                 Please view results for these tests on the individual orders.   GREEN TOP   LAVENDER TOP   LIGHT BLUE TOP       EKG:   ECG 12 Lead ED Triage Standing Order; SOA   Final Result   HEART RATE= 96  bpm   RR Interval= 625  ms   MN Interval= 166  ms   P Horizontal Axis= -35  deg   P Front Axis= 46  deg   QRSD Interval= 162  ms   QT Interval= 381  ms   QTcB= 482  ms   QRS Axis= -39  deg   T Wave Axis= -21  deg   - ABNORMAL ECG -   Sinus rhythm   Probable left atrial enlargement   Right bundle branch block   No change from  previous tracing   Electronically Signed By: Abby Loera ([facility]) 26-Mar-2024 10:56:11   Date and Time of Study: 2024-03-26 06:44:09          Meds given in ED:   Medications   sodium chloride 0.9 % flush 10 mL (has no administration in time range)   ipratropium-albuterol (DUO-NEB) nebulizer solution 3 mL (3 mL Nebulization Given 3/26/24 1113)   methylPREDNISolone sodium succinate (SOLU-Medrol) injection 40 mg (40 mg Intravenous Given 3/26/24 1117)   ondansetron (ZOFRAN) injection 4 mg (4 mg Intravenous Given 3/26/24 0919)   morphine injection 4 mg (4 mg Intravenous Given 3/26/24 0920)   ipratropium-albuterol (DUO-NEB) nebulizer solution 3 mL (3 mL Nebulization Given 3/26/24 0736)   iopamidol (ISOVUE-370) 76 % injection 100 mL (95 mL Intravenous Given by Other 3/26/24 0928)       Imaging results:  CT Angiogram Chest    Result Date: 3/26/2024   1. No pulmonary embolism identified though there is suboptimal opacification of the pulmonary arterial system and segmental and subsegmental pulmonary arteries are poorly evaluated. 2. Enlarged main pulmonary artery, can be seen with pulmonary artery hypertension. 3. Bibasilar lung opacities may represent partial atelectasis or bronchopneumonia in the appropriate clinical setting. 4. Borderline cardiomegaly. 5. Hepatic steatosis. 6. Cholelithiasis  This report was finalized on 3/26/2024 10:00 AM by Dr. Mati Kirby M.D on Workstation: [id number]      XR Chest 1 View    Result Date: 3/26/2024  FINDINGS AND IMPRESSION: The lungs are hypoinflated. Evaluation is suboptimal due to patient body habitus. There is an absence of lung markings within the right lung apex with curvilinear hyperdensity projecting through this area not definitively seen on prior radiograph. Small pneumothorax cannot be excluded and further evaluation with chest CT is recommended.  There is mild to moderate bibasal pulmonary opacification, left greater than right, which has mildly worsened since  3/12/2024. Attention on above-mentioned chest CT is recommended to further characterize. Cardiac silhouette is accentuated by low lung volumes.  This report was finalized on 3/26/2024 7:27 AM by Dr. Dutch Balbuena M.D on Workstation: [id number]       Ambulatory status:   - standby    Social issues:   Social History     Socioeconomic History    Marital status: Single   Tobacco Use    Smoking status: Every Day     Current packs/day: 1.00     Types: Cigarettes   Vaping Use    Vaping status: Never Used   Substance and Sexual Activity    Alcohol use: Never    Drug use: Yes     Types: Marijuana    Sexual activity: Defer       Peripheral Neurovascular  Peripheral Neurovascular (Adult)  Peripheral Neurovascular WDL: WDL    Neuro Cognitive  Neuro Cognitive (Adult)  Cognitive/Neuro/Behavioral WDL: WDL    Learning  Learning Assessment (Adult)  Learning Readiness and Ability: no barriers identified  Education Provided  Person Taught: patient  Teaching Method: verbal instruction  Teaching Focus: symptom/problem overview, risk factors/triggers, diagnostic test, medication administration    Respiratory  Respiratory (Adult)  Airway WDL: WDL  Respiratory WDL  Respiratory WDL: .WDL except (pt complains of soa and L back pain. Pt states he has pain with inspiration. hx COPD. pt wears 2L NC at home. pt 83% on 2L NC at triage. pt now on nonrebreather at 89%)  Breath Sounds  Breath Sounds: All Fields  All Lung Fields Breath Sounds: Anterior:, diminished  Breath Sounds Post-Respiratory Treatment  Breath Sounds Posttreatment All Fields: All Fields  Breath Sounds Posttreatment All Fields: Anterior:, no change    Abdominal Pain       Pain Assessments  Pain (Adult)  (0-10) Pain Rating: Rest: 8  Pain Location: chest, back  Pain Side/Orientation: left  Response to Pain Interventions: nonverbal indicators absent/decreased    NIH Stroke Scale       Eli Vallecillo RN  03/26/24 11:23 EDT     Electronically signed by Eli Vallecillo RN at  03/26/24 1123       Robbie Daniel MD at 03/26/24 0658        Procedure Orders    1. Critical Care [[id number]] ordered by Robbie Daniel MD                  EMERGENCY DEPARTMENT ENCOUNTER    Room Number:  10/10  PCP: Provider, No Known  Historian: Patient      HPI:  Chief Complaint: Chest/flank pain  A complete HPI/ROS/PMH/PSH/SH/FH are unobtainable due to: Nothing  Context: Josh Hinojosa is a 48 y.o. male with a medical history of COPD, CHF, chronic hypoxic respiratory failure who presents to the ED c/o acute pain in his left lateral chest/flank.  Pain began 2 days ago and is intermittent.  Pain is sharp and stabbing.  Pain is worse with taking deep breaths and with movement.  Patient denies recent injury.  He was admitted here earlier this month for pneumonia, CHF, COPD, and respiratory failure.  He is on 2 L of oxygen at all times.  He still has a mild nonproductive cough.  Denies fever, chills, worsening shortness of breath, abdominal pain, leg pain, leg swelling, palpitations, dizziness, or syncope.  Denies history of CAD or PE/DVT.            PAST MEDICAL HISTORY  Active Ambulatory Problems     Diagnosis Date Noted    Acute hypoxemic respiratory failure 03/07/2024     Resolved Ambulatory Problems     Diagnosis Date Noted    No Resolved Ambulatory Problems     No Additional Past Medical History         PAST SURGICAL HISTORY  History reviewed. No pertinent surgical history.      FAMILY HISTORY  History reviewed. No pertinent family history.      SOCIAL HISTORY  Social History     Socioeconomic History    Marital status: Single   Tobacco Use    Smoking status: Every Day     Current packs/day: 1.00     Types: Cigarettes   Vaping Use    Vaping status: Never Used   Substance and Sexual Activity    Alcohol use: Never    Drug use: Yes     Types: Marijuana    Sexual activity: Defer         ALLERGIES  Patient has no known allergies.    REVIEW OF SYSTEMS  Review of Systems  Included in HPI  All systems reviewed  and negative except for those discussed in HPI.      PHYSICAL EXAM  ED Triage Vitals [03/26/24 0636]   Temp Heart Rate Resp BP SpO2   99.7 °F (37.6 °C) 106 22 -- (!) 89 %      Temp src Heart Rate Source Patient Position BP Location FiO2 (%)   -- Monitor -- -- --       Physical Exam      GENERAL: Awake, alert, oriented x 3.  Well-developed nontoxic-appearing male.  Appears uncomfortable.  BMI 51.7.    HENT: NCAT, nares patent  EYES: no scleral icterus  CV: regular rhythm, normal rate  RESPIRATORY: normal effort, diminished breath sounds in both lung bases  ABDOMEN: soft, nontender, no CVA tenderness  MUSCULOSKELETAL: There is tenderness of the left lateral inferior ribs.  No crepitus.  Extremities are nontender with full range of motion.  No calf tenderness  NEURO: Speech is normal.  No facial droop.  Follows commands  PSYCH:  calm, cooperative  SKIN: warm, dry    Vital signs and nursing notes reviewed.          LAB RESULTS  Recent Results (from the past 24 hour(s))   ECG 12 Lead ED Triage Standing Order; SOA    Collection Time: 03/26/24  6:44 AM   Result Value Ref Range    QT Interval 381 ms    QTC Interval 482 ms   Comprehensive Metabolic Panel    Collection Time: 03/26/24  6:51 AM    Specimen: Blood   Result Value Ref Range    Glucose 168 (H) 65 - 99 mg/dL    BUN 21 (H) 6 - 20 mg/dL    Creatinine 1.04 0.76 - 1.27 mg/dL    Sodium 138 136 - 145 mmol/L    Potassium 4.8 3.5 - 5.2 mmol/L    Chloride 100 98 - 107 mmol/L    CO2 29.0 22.0 - 29.0 mmol/L    Calcium 9.3 8.6 - 10.5 mg/dL    Total Protein 7.0 6.0 - 8.5 g/dL    Albumin 4.0 3.5 - 5.2 g/dL    ALT (SGPT) 34 1 - 41 U/L    AST (SGOT) 21 1 - 40 U/L    Alkaline Phosphatase 58 39 - 117 U/L    Total Bilirubin 0.4 0.0 - 1.2 mg/dL    Globulin 3.0 gm/dL    A/G Ratio 1.3 g/dL    BUN/Creatinine Ratio 20.2 7.0 - 25.0    Anion Gap 9.0 5.0 - 15.0 mmol/L    eGFR 89.1 >60.0 mL/min/1.73   BNP    Collection Time: 03/26/24  6:51 AM    Specimen: Blood   Result Value Ref Range     proBNP 52.3 0.0 - 450.0 pg/mL   Single High Sensitivity Troponin T    Collection Time: 03/26/24  6:51 AM    Specimen: Blood   Result Value Ref Range    HS Troponin T 16 <22 ng/L   Green Top (Gel)    Collection Time: 03/26/24  6:51 AM   Result Value Ref Range    Extra Tube Hold for add-ons.    Lavender Top    Collection Time: 03/26/24  6:51 AM   Result Value Ref Range    Extra Tube hold for add-on    Light Blue Top    Collection Time: 03/26/24  6:51 AM   Result Value Ref Range    Extra Tube Hold for add-ons.    CBC Auto Differential    Collection Time: 03/26/24  6:51 AM    Specimen: Blood   Result Value Ref Range    WBC 10.37 3.40 - 10.80 10*3/mm3    RBC 5.25 4.14 - 5.80 10*6/mm3    Hemoglobin 16.1 13.0 - 17.7 g/dL    Hematocrit 48.4 37.5 - 51.0 %    MCV 92.2 79.0 - 97.0 fL    MCH 30.7 26.6 - 33.0 pg    MCHC 33.3 31.5 - 35.7 g/dL    RDW 13.1 12.3 - 15.4 %    RDW-SD 44.2 37.0 - 54.0 fl    MPV 9.7 6.0 - 12.0 fL    Platelets 223 140 - 450 10*3/mm3    Neutrophil % 72.5 42.7 - 76.0 %    Lymphocyte % 17.6 (L) 19.6 - 45.3 %    Monocyte % 6.9 5.0 - 12.0 %    Eosinophil % 2.0 0.3 - 6.2 %    Basophil % 0.4 0.0 - 1.5 %    Immature Grans % 0.6 (H) 0.0 - 0.5 %    Neutrophils, Absolute 7.51 (H) 1.70 - 7.00 10*3/mm3    Lymphocytes, Absolute 1.83 0.70 - 3.10 10*3/mm3    Monocytes, Absolute 0.72 0.10 - 0.90 10*3/mm3    Eosinophils, Absolute 0.21 0.00 - 0.40 10*3/mm3    Basophils, Absolute 0.04 0.00 - 0.20 10*3/mm3    Immature Grans, Absolute 0.06 (H) 0.00 - 0.05 10*3/mm3    nRBC 0.0 0.0 - 0.2 /100 WBC   Procalcitonin    Collection Time: 03/26/24  6:51 AM    Specimen: Blood   Result Value Ref Range    Procalcitonin 0.06 0.00 - 0.25 ng/mL   Blood Gas, Arterial -    Collection Time: 03/26/24  8:24 AM    Specimen: Arterial Blood   Result Value Ref Range    Site Right Radial     Omar's Test Positive     pH, Arterial 7.384 7.350 - 7.450 pH units    pCO2, Arterial 54.4 (H) 35.0 - 45.0 mm Hg    pO2, Arterial 129.1 (H) 80.0 - 100.0 mm Hg     HCO3, Arterial 32.5 (H) 22.0 - 28.0 mmol/L    Base Excess, Arterial 5.3 (H) 0.0 - 2.0 mmol/L    O2 Saturation, Arterial 98.8 (H) 92.0 - 98.5 %    CO2 Content 34.2 (H) 23 - 27 mmol/L    Barometric Pressure for Blood Gas 739.3000 mmHg    Modality NRB     Flow Rate 15.0000 lpm    Rate 16 Breaths/minute    Hemodilution No     Device Comment Sat 88%, ID [id number]        Ordered the above labs and reviewed the results.        RADIOLOGY  CT Angiogram Chest    Result Date: 3/26/2024  CT ANGIOGRAM CHEST-  INDICATION: Pleuritic left chest pain. Hypoxia.  COMPARISON: None  TECHNIQUE: Routine CTA chest with IV contrast. Coronal and sagittal reformats. Three dimensional reconstructions. Radiation dose reduction techniques were utilized, including automated exposure control and exposure modulation based on body size.  FINDINGS:  Pulmonary arteries: Streak artifact from contrast in the venous system. Suboptimal opacification of the pulmonary arterial system with the main pulmonary artery measuring 138 Hounsfield units. Suboptimal evaluation of segmental and subsegmental pulmonary arteries. No pulmonary embolism identified.  Chest wall: No lymphadenopathy.  Thyroid: Visualized thyroid is normal.  Mediastinum: Coronary artery atherosclerotic calcifications. Borderline cardiomegaly. No pericardial effusion. Enlarged main pulmonary artery measuring 4.8 cm. AP window lymph node is mildly prominent measuring 1 cm. Subcarinal lymph node is mildly larger measuring 1.3 cm.  Lungs/pleura: Trace bilateral pleural fluid. Patent central airways. Posterior dependent bilateral subsegmental atelectasis. Bibasilar lung opacities, with some volume loss, greatest in the lower lobes and to a lesser extent in the inferior right middle lobe and lingula.  Upper abdomen: Hepatic steatosis suspected. Cholelithiasis. Nonobstructing nephrolithiasis seen in the superior pole right kidney measures 2 mm. Small fluid attenuating cyst in the right mid kidney.   Osseous structures: No destructive osseous lesions..       1. No pulmonary embolism identified though there is suboptimal opacification of the pulmonary arterial system and segmental and subsegmental pulmonary arteries are poorly evaluated. 2. Enlarged main pulmonary artery, can be seen with pulmonary artery hypertension. 3. Bibasilar lung opacities may represent partial atelectasis or bronchopneumonia in the appropriate clinical setting. 4. Borderline cardiomegaly. 5. Hepatic steatosis. 6. Cholelithiasis  This report was finalized on 3/26/2024 10:00 AM by Dr. Mati Kirby M.D on Workstation: [email or web address]      XR Chest 1 View    Result Date: 3/26/2024  Portable chest radiograph  HISTORY: Shortness of air  TECHNIQUE: Single AP portable radiograph of the chest  COMPARISON: Multiple chest radiographs dating back to 3/9/2024      FINDINGS AND IMPRESSION: The lungs are hypoinflated. Evaluation is suboptimal due to patient body habitus. There is an absence of lung markings within the right lung apex with curvilinear hyperdensity projecting through this area not definitively seen on prior radiograph. Small pneumothorax cannot be excluded and further evaluation with chest CT is recommended.  There is mild to moderate bibasal pulmonary opacification, left greater than right, which has mildly worsened since 3/12/2024. Attention on above-mentioned chest CT is recommended to further characterize. Cardiac silhouette is accentuated by low lung volumes.  This report was finalized on 3/26/2024 7:27 AM by Dr. Dutch Balbuena M.D on Workstation: [id number]       Ordered the above noted radiological studies. Reviewed by me in PACS.            PROCEDURES  Critical Care    Performed by: Robbie Daniel MD  Authorized by: Robbie Daniel MD    Critical care provider statement:     Critical care time (minutes):  31    Critical care time was exclusive of:  Separately billable procedures and treating other patients    Critical  care was necessary to treat or prevent imminent or life-threatening deterioration of the following conditions:  Respiratory failure    Critical care was time spent personally by me on the following activities:  Development of treatment plan with patient or surrogate, discussions with consultants, discussions with primary provider, evaluation of patient's response to treatment, examination of patient, obtaining history from patient or surrogate, ordering and performing treatments and interventions, ordering and review of laboratory studies, ordering and review of radiographic studies, pulse oximetry, re-evaluation of patient's condition and review of old charts    I assumed direction of critical care for this patient from another provider in my specialty: no      Care discussed with: admitting provider        OUTPATIENT MEDICATION MANAGEMENT:  Current Facility-Administered Medications Ordered in Epic   Medication Dose Route Frequency Provider Last Rate Last Admin    cefdinir (OMNICEF) capsule 300 mg  300 mg Oral Q12H Dougie Lang MD        ipratropium-albuterol (DUO-NEB) nebulizer solution 3 mL  3 mL Nebulization 4x Daily - RT Dougie Lang MD   3 mL at 03/26/24 1113    predniSONE (DELTASONE) tablet 40 mg  40 mg Oral Daily With Breakfast Dougie Lang MD        sodium chloride 0.9 % flush 10 mL  10 mL Intravenous PRN Robbie Daniel MD         Current Outpatient Medications Ordered in Epic   Medication Sig Dispense Refill    Fluticasone-Umeclidin-Vilant (Trelegy Ellipta) 200-62.5-25 MCG/ACT inhaler Inhale 1 puff Daily. 60 each 0    furosemide (Lasix) 40 MG tablet Take 1 tablet by mouth Daily. 30 tablet 0    ipratropium-albuterol (DUO-NEB) 0.5-2.5 mg/3 ml nebulizer Take 3 mL by nebulization Every 4 (Four) Hours As Needed for Wheezing.      lisinopril (PRINIVIL,ZESTRIL) 10 MG tablet Take 1 tablet by mouth Daily. 30 tablet 0    nystatin (MYCOSTATIN) 100,000 unit/mL suspension Swish and swallow 5 mL 4  (Four) Times a Day.             MEDICATIONS GIVEN IN ER  Medications   sodium chloride 0.9 % flush 10 mL (has no administration in time range)   ipratropium-albuterol (DUO-NEB) nebulizer solution 3 mL (3 mL Nebulization Given 3/26/24 1113)   predniSONE (DELTASONE) tablet 40 mg (has no administration in time range)   cefdinir (OMNICEF) capsule 300 mg (has no administration in time range)   ondansetron (ZOFRAN) injection 4 mg (4 mg Intravenous Given 3/26/24 0919)   morphine injection 4 mg (4 mg Intravenous Given 3/26/24 0920)   ipratropium-albuterol (DUO-NEB) nebulizer solution 3 mL (3 mL Nebulization Given 3/26/24 0736)   iopamidol (ISOVUE-370) 76 % injection 100 mL (95 mL Intravenous Given by Other 3/26/24 0928)                   MEDICAL DECISION MAKING, PROGRESS, and CONSULTS    All labs have been independently reviewed by me.  All radiology studies have been reviewed by me and I have also reviewed the radiology report.   EKG's independently viewed and interpreted by me.  Discussion below represents my analysis of pertinent findings related to patient's condition, differential diagnosis, treatment plan and final disposition.      Additional sources:    - Discussed/ obtained information from independent historians: None    - External (non-ED) record review: Patient was admitted here 3/7 through 3/13/2024 for acute hypoxemic and hypercapnic respiratory failure, acute on chronic CHF, pneumonia, human metapneumovirus infection, and COPD exacerbation.  He was treated with NIPPV.  He was discharged on cefdinir and 4 L of oxygen.  CTA of the chest, abdomen, and pelvis done in November 2023 were unremarkable.  There were no PE.    -Prescription drug monitoring program review:     N/A    - Chronic or social conditions impacting patient care (Social Determinants of Health): None          Orders placed during this visit:  Orders Placed This Encounter   Procedures    Critical Care    S. Pneumo Ag Urine or CSF - Urine, Urine,  Clean Catch    Respiratory Culture - Sputum, Cough    XR Chest 1 View    CT Angiogram Chest    [place] Draw    Comprehensive Metabolic Panel    BNP    Single High Sensitivity Troponin T    CBC Auto Differential    Blood Gas, Arterial -    Blood Gas, Arterial -    Blood Gas, Arterial -    Procalcitonin    Diet: Regular/House; Fluid Consistency: Thin (IDDSI 0)    Undress & Gown    Continuous Pulse Oximetry    Vital Signs    Pulmonology (on-call MD unless specified)    LHA (on-call MD unless specified) Details    Inpatient Pulmonology Consult    Inpatient Cardiology Consult    Oxygen Therapy- Nasal Cannula; Titrate 1-6 LPM Per SpO2; 88 - 92%    Oxygen Therapy- Heated High Flow Nasal Cannula; Titrate 30-60 LPM Per SpO2; 88 - 92%    Incentive Spirometry    NIPPV-IPPV / BIPAP / CPAP    ECG 12 Lead ED Triage Standing Order; SOA    Insert Peripheral IV    Initiate Observation Status    CBC & Differential    Green Top (Gel)    Lavender Top    Light Blue Top         Additional orders considered but not ordered:          Differential diagnosis includes, but is not limited to:    Differential diagnosis includes but is not limited to CHF, acute coronary syndrome, pulmonary embolism, pneumothorax, pneumonia, asthma/COPD, deconditioning, anemia, anxiety.         Independent interpretation of labs, radiology studies, and discussions with consultants:  ED Course as of 03/26/24 1224   Tue Mar 26, 2024   0710 EKG personally interpreted by me.  My personal interpretation is:          EKG time: 6:44 AM  Rhythm/Rate: Minus rhythm, rate 96  P waves and OK: LAE  QRS, axis: RBBB  ST and T waves: Inverted T waves in the anterior and inferior leads, minimal ST elevation in the lateral leads    Interpreted Contemporaneously by me, independently viewed  EKG is not significantly changed compared to prior EKG done on 3/7/2024   [WH]   0711 Patient's O2 sats were 83% on 2 L.  He was placed on a nonrebreather. [WH]   0720 Chest x-ray personally  interpreted by me.  My personal interpretation is: There is cardiomegaly.  There is increased vascular congestion.  Both lung bases are obscured.  Possible small bilateral pleural effusions. [WH]   0727 HS Troponin T: 16 [WH]   0727 proBNP: 52.3 [WH]   0727 BUN(!): 21 [WH]   0727 Creatinine: 1.04 [WH]   0738 Per the radiologist, chest x-ray shows hyperinflation of the lungs.  Lung markings are absent within the right lung apex.  Small pneumothorax cannot be excluded.  There is mild to moderate bibasilar pulmonary opacification, left greater than right, which is slightly worse than study done on 3/12/2024. [WH]   0740 ABG reviewed.  pH is normal.  pCO2 55.6.  pO2 72.5. [WH]   0759 Initial ABG was drawn from this patient but he had the wrong ID bracelet on.  Therefore, the ABG results appear in another patient's chart.  Repeat ABG has been ordered. [WH]   0800 Patient is a difficult stick.  Waiting for IV nurse to come to the ED. [WH]   0807 SpO2: 95 % [WH]   0910 IV nurse was able to place an IV. [WH]   0917 pH, Arterial: 7.384 [WH]   0917 pCO2, Arterial(!): 54.4 [WH]   0917 pO2, Arterial(!): 129.1 [WH]   0917 HCO3, Arterial(!): 32.5 [WH]   0917 Flow Rate: 15.0000 [WH]   0917 Modality: NRB [WH]   1010 CTA chest is negative for PE although there is suboptimal opacification of the segmental and subsegmental pulmonary arteries.  Main pulmonary artery is enlarged.  There are bibasilar opacities which may represent atelectasis or pneumonia.  There is borderline cardiomegaly. [WH]   1019 Case discussed with Dr. Dougie Lang, pulmonology, he is familiar with the patient.  Pertinent history, exam findings, test results, ED course, and diagnoses were discussed with him.  He will see the patient in consult. [WH]   1022 Patient reports his pain is better after morphine.  Oxygen saturation is currently 94% on the nonrebreather.  Patient will be placed on a high flow cannula.  Test results and plan for admission were discussed  with him. [WH]   1022 Procalcitonin: 0.06 [WH]   1028 O2 sats would not get above 89% on 15 L high flow cannula.  Will try the patient on heated high flow oxygen [WH]   1043 Case discussed with Dr. Luis and he agrees to admit the patient to a monitored bed.  Pertinent history, exam findings, test results, ED course, and pending pulmonary consultation were discussed with him. [WH]   1059 MDM: Patient presented to ED complaining of pleuritic left chest/left flank pain.  He was recently hospitalized for respiratory failure and pneumonia.  He is on 2 L of oxygen at baseline.  Here in the ED, he was hypoxic and was placed on a nonrebreather.  He was then eventually placed on Optiflow.  He was not tachycardic and denied worsening shortness of breath.  CTA of the chest was negative for central PE.  There was atelectasis/infiltrate in both lung bases.  However, white blood cell count and procalcitonin were normal.  Patient just completed a course of antibiotics.  I doubt he has recurrent pneumonia.  He was given a nebulizer treatment and IV pain medication.  Case was discussed with pulmonology and the hospitalist.  Patient will be admitted.  Critical care was performed on this patient. [WH]   1207 Dr. Dougie Lang is now at bedside. [WH]      ED Course User Index  [WH] Robbie Daniel MD         COMPLEXITY OF CARE  The patient requires admission.      DIAGNOSIS  Final diagnoses:   Acute on chronic respiratory failure with hypoxia and hypercapnia   Pleuritic chest pain         DISPOSITION  ADMISSION    Discussed treatment plan and reason for admission with pt/family and admitting physician.  Pt/family voiced understanding of the plan for admission for further testing/treatment as needed.               Latest Documented Vital Signs:  AS OF 12:24 EDT VITALS:    BP - 121/70  HR - 83  TEMP - 99.7 °F (37.6 °C)  O2 SATS - 93%            --    Please note that portions of this were completed with a voice recognition program.        Note Disclaimer: At [facility], we believe that sharing information builds trust and better relationships. You are receiving this note because you are receiving care at [facility] or recently visited. It is possible you will see health information before a provider has talked with you about it. This kind of information can be easy to misunderstand. To help you fully understand what it means for your health, we urge you to discuss this note with your provider.             Robbie Daniel MD  03/26/24 1123       Robbie Daniel MD  03/26/24 1224      Electronically signed by Robbie Daniel MD at 03/26/24 1224       Oxygen Therapy (last 2 days)       Date/Time SpO2 Device (Oxygen Therapy) Flow (L/min) Oxygen Concentration (%) ETCO2 (mmHg)    03/27/24 1443 91 humidified;high-flow nasal cannula 60 65 --    03/27/24 1029 92 humidified;high-flow nasal cannula 60 70 --    03/27/24 0728 94 humidified;high-flow nasal cannula 60 80 --    03/27/24 0648 94 humidified;high-flow nasal cannula 60 90 --    03/27/24 0415 95 heated;high-flow nasal cannula -- 100 --    03/27/24 0200 -- NPPV/NIV -- 100 --    03/26/24 2340 90 NPPV/NIV -- 100 --    03/26/24 2325 91 NPPV/NIV -- 100 --    03/26/24 2108 91 heated;high-flow nasal cannula 600 100 --    03/26/24 2000 -- heated;high-flow nasal cannula 60 100 --    03/26/24 1935 91 nasal cannula;high-flow nasal cannula -- -- --    03/26/24 1934 92 -- -- -- --    03/26/24 1823 92 -- -- -- --    03/26/24 1800 91 -- -- -- --    03/26/24 1600 89 -- -- -- --    03/26/24 1516 92 humidified;high-flow nasal cannula;heated 60 100 --    03/26/24 1430 91 -- -- -- --    03/26/24 1335 -- heated;humidified;high-flow nasal cannula 60 100 --    03/26/24 1300 94 humidified;high-flow nasal cannula;heated 60 100 --    03/26/24 1150 93 -- -- -- --    03/26/24 1119 94 -- -- -- --    03/26/24 1118 90 -- -- -- --    03/26/24 1117 94 -- -- -- --    03/26/24 1113 95 high-flow nasal  cannula;humidified 60 95 --    03/26/24 1041 92 heated;humidified;high-flow nasal cannula 60 95 --    03/26/24 1037 90 -- -- -- --    03/26/24 1017 90 -- -- -- --    03/26/24 1016 90 -- -- -- --    03/26/24 0948 91 -- -- -- --    03/26/24 0906 92 -- -- -- --    03/26/24 0901 90 -- -- -- --    03/26/24 0805 95 -- -- -- --    03/26/24 0736 85 -- -- -- --    03/26/24 0722 89 nonrebreather mask -- 15 --    03/26/24 0703 88 -- -- -- --    03/26/24 0636 89 room air -- -- --             Physician Progress Notes (last 48 hours)        Puneet Xavier MD at 03/27/24 0942       Summary:[facility]   48 y.o.  male    LOS: 0 days   Patient Care Team:  Provider, No Known as PCP - General      Subjective   C/o cough  Review of Systems:   Lungs: + cough, no SOA  CV: No CP, no palpitations  GI: No nausea, vomiting, or diarrhea.     Medication Review:   Current Facility-Administered Medications:     acetaminophen (TYLENOL) tablet 650 mg, 650 mg, Oral, Q4H PRN **OR** acetaminophen (TYLENOL) 160 MG/5ML oral solution 650 mg, 650 mg, Oral, Q4H PRN **OR** acetaminophen (TYLENOL) suppository 650 mg, 650 mg, Rectal, Q4H PRN, Eduardo Luis MD    sennosides-docusate (PERICOLACE) 8.6-50 MG per tablet 2 tablet, 2 tablet, Oral, BID PRN **AND** polyethylene glycol (MIRALAX) packet 17 g, 17 g, Oral, Daily PRN **AND** bisacodyl (DULCOLAX) EC tablet 5 mg, 5 mg, Oral, Daily PRN **AND** bisacodyl (DULCOLAX) suppository 10 mg, 10 mg, Rectal, Daily PRN, Eduardo Luis MD    cefdinir (OMNICEF) capsule 300 mg, 300 mg, Oral, Q12H, Dougie Lang MD, 300 mg at 03/26/24 2037    furosemide (LASIX) injection 40 mg, 40 mg, Intravenous, BID, Puneet Xavier MD, 40 mg at 03/26/24 1821    HYDROcodone-acetaminophen (NORCO) 5-325 MG per tablet 1 tablet, 1 tablet, Oral, Q6H PRN, Eduardo Luis MD, 1 tablet at 03/26/24 2335    ipratropium-albuterol (DUO-NEB) nebulizer solution 3 mL, 3 mL, Nebulization, 4x Daily - RT, Dougie Lang  MD ROHAN, 3 mL at 03/27/24 0648    ipratropium-albuterol (DUO-NEB) nebulizer solution 3 mL, 3 mL, Nebulization, Q4H PRN, Eduardo Luis MD    lisinopril (PRINIVIL,ZESTRIL) tablet 10 mg, 10 mg, Oral, Q24H, Eduardo Luis MD, 10 mg at 03/26/24 1528    morphine injection 4 mg, 4 mg, Intravenous, Q4H PRN, Eduardo Luis MD, 4 mg at 03/26/24 1609    nystatin (MYCOSTATIN) 100,000 unit/mL suspension 500,000 Units, 500,000 Units, Swish & Swallow, 4x Daily, Eduardo Luis MD, 500,000 Units at 03/27/24 0942    ondansetron ODT (ZOFRAN-ODT) disintegrating tablet 4 mg, 4 mg, Oral, Q6H PRN **OR** ondansetron (ZOFRAN) injection 4 mg, 4 mg, Intravenous, Q6H PRN, Eduardo Luis MD    predniSONE (DELTASONE) tablet 40 mg, 40 mg, Oral, Daily With Breakfast, Dougie Lang MD, 40 mg at 03/26/24 1527    sodium chloride 0.9 % flush 10 mL, 10 mL, Intravenous, PRN, Robbie Daniel MD    sodium chloride 0.9 % flush 10 mL, 10 mL, Intravenous, Q12H, Eduardo Luis MD, 10 mL at 03/26/24 2048    sodium chloride 0.9 % flush 10 mL, 10 mL, Intravenous, PRN, Eduardo Luis MD    sodium chloride 0.9 % infusion 40 mL, 40 mL, Intravenous, PRN, Eduardo Luis MD      Objective     Vital Sign Min/Max for last 24 hours  Temp  Min: 97.7 °F (36.5 °C)  Max: 98.3 °F (36.8 °C)   BP  Min: 121/70  Max: 172/95    Pulse  Min: 73  Max: 93         03/26/24  0636 03/26/24  1335   Weight: (!) 159 kg (350 lb) (!) 160 kg (352 lb 4.7 oz)        Intake/Output Summary (Last 24 hours) at 3/27/2024 0942  Last data filed at 3/27/2024 0729  Gross per 24 hour   Intake 177 ml   Output 2650 ml   Net -2473 ml       Physical Exam:    General Appearance:    No acute distress   Lungs:     Decreased, crackles in bases. No wheezes, rhonchi or rales.     Heart:    Regular rate and rhythm.  Normal S1 and S2. No murmur, no gallop, rub or lift. , No JVD.   Abdomen:     Normal bowel sounds, soft, nontender, nondistended.   Extremities:   No clubbing, cyanosis or +edema.     Skin:   Warm,  "dry   Neurologic:   Awake       Monitor: sr  Results Review:     I reviewed the patient's new clinical results.    Sodium   Date Value Ref Range Status   03/27/2024 139 136 - 145 mmol/L Final   03/26/2024 138 136 - 145 mmol/L Final     Potassium   Date Value Ref Range Status   03/27/2024 5.0 3.5 - 5.2 mmol/L Final     Comment:     Slight hemolysis detected by analyzer. Result may be falsely elevated.   03/26/2024 4.8 3.5 - 5.2 mmol/L Final     Comment:     Specimen hemolyzed.  Result may be falsely elevated.     Chloride   Date Value Ref Range Status   03/27/2024 97 (L) 98 - 107 mmol/L Final   03/26/2024 100 98 - 107 mmol/L Final     No results found for: \"PLASMABICARB\"  BUN   Date Value Ref Range Status   03/27/2024 21 (H) 6 - 20 mg/dL Final   03/26/2024 21 (H) 6 - 20 mg/dL Final     Creatinine   Date Value Ref Range Status   03/27/2024 0.83 0.76 - 1.27 mg/dL Final   03/26/2024 1.04 0.76 - 1.27 mg/dL Final     Calcium   Date Value Ref Range Status   03/27/2024 9.5 8.6 - 10.5 mg/dL Final   03/26/2024 9.3 8.6 - 10.5 mg/dL Final     No results found for: \"MG\"    Results from last 7 days   Lab Units 03/27/24  0749   WBC 10*3/mm3 11.69*   HEMOGLOBIN g/dL 15.7   HEMATOCRIT % 47.4   PLATELETS 10*3/mm3 227     No results found for: \"CHOL\"  No results found for: \"HDL\"  No results found for: \"LDL\"  No results found for: \"TRIG\"      Results from last 7 days   Lab Units 03/26/24  0651   HSTROP T ng/L 16     Results from last 7 days   Lab Units 03/26/24  0651   PROBNP pg/mL 52.3               Assessment/ Plan    Acute hypoxic respiratory failure with chronic hypercapnic failure  PNA vs atelectasis  COPD  Chronic diastolic CHF    A. Echo  3/7/24: Left ventricular systolic function is hyperdynamic (EF > 70%). Calculated left ventricular EF = 78.8% Normal left ventricular cavity size noted. Left ventricular wall thickness is consistent with severe concentric hypertrophy. All left ventricular wall segments contract normally. Left " ventricular diastolic function was normal.   The right ventricular cavity is mildly dilated. Normal right ventricular systolic function noted.  The left atrial cavity is moderately dilated. The right atrial cavity is moderately dilated.   Trace tricuspid valve regurgitation is present. Insufficient TR velocity profile to estimate the right ventricular systolic pressure.   Borderline dilation of the ascending aorta is present. Aortic arch = 3.7 cm       Continue diuresis.  When more stable right heart catheterization will be considered, will need to ask [facility] to do the procedure  Further recs per Dr. Xavier.     Time:  20 min  Patient seen and examined  I reviewed Gisela Chadwick's note and I agree with the assessment and accept the inherent risks  Will switch to p.o. diuretics in the morning      Electronically signed by Puneet Xavier MD at 03/27/24 1019       Dougie Lang MD at 03/27/24 5141          [facility]     Mar/chart reviewed  Follow up acute on chronic hypoxemic respiratory failure  Some cough with sputum  Some flank pain    Vital Sign Min/Max for last 24 hours  Temp  Min: 97.7 °F (36.5 °C)  Max: 98.3 °F (36.8 °C)   BP  Min: 121/70  Max: 172/95   Pulse  Min: 73  Max: 96   Resp  Min: 16  Max: 22   SpO2  Min: 89 %  Max: 95 %   Flow (L/min)  Min: 60  Max: 600   Weight  Min: 160 kg (352 lb 4.7 oz)  Max: 160 kg (352 lb 4.7 oz)   177/2000  Appears ill, axox3,   perrl, eomi, normal sclera  mmm, no jvd, trachea midline, neck supple,  chest decreased ae bilaterally, + crackles, no wheezes,   rrr,   soft, nt, nd +bs,  no c/c/ 1+ edema  Skin warm, dry no rashes    Labs: 3/27: reviewed:  Cbc, bmp pending  3/26:   Urine strep neg  Rpp neg    A/P:  Acute hypoxemic respiratory failure - HIs degree of hypoxemia is out of proportion to PFT -- repeat echo with bubble to eval for shunt?   PNeumonia -- could just be atelectasis on ct -- will treat with antibiotics for now  Flank/chest pain  -- suspect MSK or pleuritic. --will treat for pleurisy with small dose steorids.   Mixed obstructive and restrictive lung disease  Chronic hypercapnic respiratory failure  Chronic diastolic chf -- last echo looked pretty good, consider RHC?  Morbid obesity  8.  SHARMILA    Agree with need for RHC, hopefully can get his oxygen requirement imprved a bit prior to that.     Electronically signed by Dougie Lang MD at 03/27/24 0925          Consult Notes (last 48 hours)        Puneet Xavier MD at 03/26/24 1625          [facility]   48 y.o.  male    LOS: 0 days   Patient Care Team:  Provider, No Known as PCP - General      Subjective     Chief Complaint:    History of Present Illness:     48-year-old white male patient who been followed by my colleague Dr.Arun Alberto is admitted with acute on chronic hypoxic respiratory failure.  Patient has COPD and sleep apnea and on CPAP at night.  He also complained of pleuritic pain on my On the right side on a deep breath.  CT angiogram of the chest showed no pulmonary embolism cardiomegaly noted.  He has history of acute on chronic diastolic heart failure.  Patient has marked obesity.  He was a smoker but quit about 2 weeks ago.  He used to do construction work.  Lives alone.  Echocardiogram on 3/7/2024 showed ejection fraction more than 70%.  Mild tricuspid regurgitation noted which was insufficient to measure RV systolic pressure.  He has hypertension hyperlipidemia.  He had a heart catheterization in the past which apparently showed no blockages.      Past Medical History:   Diagnosis Date    COPD (chronic obstructive pulmonary disease)     Hypertension      History reviewed. No pertinent surgical history.  Medications Prior to Admission   Medication Sig Dispense Refill Last Dose    Fluticasone-Umeclidin-Vilant (Trelegy Ellipta) 200-62.5-25 MCG/ACT inhaler Inhale 1 puff Daily. 60 each 0     furosemide (Lasix) 40 MG tablet Take 1 tablet by mouth Daily. 30 tablet 0      ipratropium-albuterol (DUO-NEB) 0.5-2.5 mg/3 ml nebulizer Take 3 mL by nebulization Every 4 (Four) Hours As Needed for Wheezing.       lisinopril (PRINIVIL,ZESTRIL) 10 MG tablet Take 1 tablet by mouth Daily. 30 tablet 0     nystatin (MYCOSTATIN) 100,000 unit/mL suspension Swish and swallow 5 mL 4 (Four) Times a Day.          History reviewed. No pertinent family history.  Social History     Socioeconomic History    Marital status: Single   Tobacco Use    Smoking status: Every Day     Current packs/day: 1.00     Types: Cigarettes   Vaping Use    Vaping status: Never Used   Substance and Sexual Activity    Alcohol use: Never    Drug use: Yes     Types: Marijuana    Sexual activity: Defer     Objective       Review of Systems:   Constitutional: Negative for diaphoresis, fatigue, fever and unexpected weight change.   HENT: Negative.    Eyes: Negative.    Respiratory: Negative for cough, shortness of breath and wheezing.    Cardiovascular: Negative for chest pain, palpitations and leg swelling.   Gastrointestinal: Negative for abdominal pain, blood in stool, constipation, diarrhea, nausea and vomiting.   Endocrine: Negative.    Genitourinary: Negative for difficulty urinating, dysuria and frequency.   Musculoskeletal: Negative.    Skin: Negative.    Allergic/Immunologic: Negative for environmental allergies and food allergies.   Neurological: Negative.    Hematological: Negative.    Psychiatric/Behavioral: Negative.        Current Facility-Administered Medications:     cefdinir (OMNICEF) capsule 300 mg, 300 mg, Oral, Q12H, Dougie Lang MD, 300 mg at 03/26/24 1527    furosemide (LASIX) tablet 40 mg, 40 mg, Oral, Daily, Eduardo Luis MD, 40 mg at 03/26/24 1527    HYDROcodone-acetaminophen (NORCO) 5-325 MG per tablet 1 tablet, 1 tablet, Oral, Q6H PRN, Eduardo Luis MD    ipratropium-albuterol (DUO-NEB) nebulizer solution 3 mL, 3 mL, Nebulization, 4x Daily - RT, Dougie Lang MD, 3 mL at 03/26/24 1516     ipratropium-albuterol (DUO-NEB) nebulizer solution 3 mL, 3 mL, Nebulization, Q4H PRN, Eduardo Luis MD    lisinopril (PRINIVIL,ZESTRIL) tablet 10 mg, 10 mg, Oral, Q24H, Eduardo Luis MD, 10 mg at 03/26/24 1528    morphine injection 4 mg, 4 mg, Intravenous, Q4H PRN, Eduardo Luis MD, 4 mg at 03/26/24 1609    nystatin (MYCOSTATIN) 100,000 unit/mL suspension 500,000 Units, 500,000 Units, Swish & Swallow, 4x Daily, Eduardo Luis MD    predniSONE (DELTASONE) tablet 40 mg, 40 mg, Oral, Daily With Breakfast, Dougie Lang MD, 40 mg at 03/26/24 1527    sodium chloride 0.9 % flush 10 mL, 10 mL, Intravenous, PRN, Robbie Daniel MD      Physical Exam:   Vital Sign Min/Max for last 24 hours  Temp  Min: 98.3 °F (36.8 °C)  Max: 99.7 °F (37.6 °C)   BP  Min: 121/70  Max: 143/88    Pulse  Min: 75  Max: 106     Wt Readings from Last 3 Encounters:   03/26/24 (!) 160 kg (352 lb 4.7 oz)   03/13/24 (!) 159 kg (349 lb 12.8 oz)       General Appearance:  Awake,  Alert, cooperative, in no acute distress   Head:  Normocephalic, without obvious abnormality, atraumatic   Eyes:          Conjunctivae normal, anicteric, eom intact    Neck: No adenopathy, supple, trachea midline, no thyromegaly, no   carotid bruit, no JVD, no elevated cvp   Lungs:   Clear to auscultation,respirations regular, even and                  unlabored    Heart:  Regular rhythm and normal rate, normal S1 and S2,            No murmur, no gallop, no rub, no click    Chest Wall:  No abnormalities observed   Abdomen:   Normal bowel sounds, soft nontender, nondistended                    Rectal:   Deferred   Extremities: No edema. Moves all extremities well, no cyanosis, no erythema   Pulses: Pulses palpable and equal bilaterally   Skin: No bleeding, bruising or rash   Neurologic: Speech clear and appropriate, no facial drooping     :       MONITOR:    Results Review:     Sodium Sodium   Date Value Ref Range Status   03/26/2024 138 136 - 145 mmol/L Final     "  Potassium Potassium   Date Value Ref Range Status   03/26/2024 4.8 3.5 - 5.2 mmol/L Final     Comment:     Specimen hemolyzed.  Result may be falsely elevated.      Chloride Chloride   Date Value Ref Range Status   03/26/2024 100 98 - 107 mmol/L Final      Bicarbonate No results found for: \"PLASMABICARB\"   BUN BUN   Date Value Ref Range Status   03/26/2024 21 (H) 6 - 20 mg/dL Final      Creatinine Creatinine   Date Value Ref Range Status   03/26/2024 1.04 0.76 - 1.27 mg/dL Final      Calcium Calcium   Date Value Ref Range Status   03/26/2024 9.3 8.6 - 10.5 mg/dL Final      Magnesium No results found for: \"MG\"     Results from last 7 days   Lab Units 03/26/24  0651   WBC 10*3/mm3 10.37   HEMOGLOBIN g/dL 16.1   HEMATOCRIT % 48.4   PLATELETS 10*3/mm3 223     Lab Results   Lab Value Date/Time    TROPONINT 16 03/26/2024 0651    TROPONINT 44 (H) 03/07/2024 1742    TROPONINT 38 (H) 03/07/2024 1410     No results found for: \"CHOL\"  No results found for: \"HDL\"  No results found for: \"LDL\"  No results found for: \"TRIG\"  No components found for: \"CHOLHDL\"  No results found for: \"PTT\"  No components found for: \"PT/INR\"  No results found for: \"HGBA1C\"       Echo EF Estimated  )No results found for: \"ECHOEFEST\"      Assessment/ Plan    Active Hospital Problems    Diagnosis  POA    **Acute on chronic respiratory failure with hypoxia and hypercapnia [J96.21, J96.22]  Yes    Pleuritic chest pain [R07.81]  Unknown     Sleep apnea  Obesity  Hypertension  Hyperlipidemia  Will treated with IV diuretics  When more stable right heart catheterization will be considered  We will need to ask [facility] to do the procedure        Puneet Xavier MD  03/26/24  16:26 EDT    Discussed with  Time:          Electronically signed by Puneet Xavier MD at 03/26/24 0300       Dougie Lang MD at 03/26/24 1154        Consult Orders    1. Inpatient Pulmonology Consult [[id number]] ordered by Eduardo Luis MD at 03/26/24 1119 "                 [facility]    Reason for consult: Acute hypoxemic respiratory failure    HPI:  Mr. Hinojosa is a 47yo male with mixed obstructive and restrictive lung disease. REcent PFT however, with preserved DLCO and lung volume but spirometry suggesting restriction.  He was just in hospital with acute exacerbation of copd/asthma and likely acute on chronic diastolic chf.  I saw him in the office after that.  He presented to the ER today with acute pain in his left flank/chest that began 2 days before and is intermittent, pain is sharp and stabbing. Worse with movement and deep breathing.  CT angio negative for PE but with bilateral lower lobe infiltrates.  He has some continued cough. Says he is not feeling short of breath at all, says he was falling asleep when he was desating in the ER and that's why he needed more oxygen than his usual 3-4 liters.     PMH  Tobacco abuse  Marijuana smoking  Chronic diastolic chf  Morbid obesity  Chronic hypercapnic respiratory failure  Mixed obstructive/restrictive lung disease    Social History     Socioeconomic History    Marital status: Single   Tobacco Use    Smoking status: Every Day     Current packs/day: 1.00     Types: Cigarettes   Vaping Use    Vaping status: Never Used   Substance and Sexual Activity    Alcohol use: Never    Drug use: Yes     Types: Marijuana    Sexual activity: Defer     History reviewed. No pertinent family history.  MEDS: reviewed as per chart  ALL: nkda  rOS: 12 point negative except as in HPI    Vital Sign Min/Max for last 24 hours  Temp  Min: 99.7 °F (37.6 °C)  Max: 99.7 °F (37.6 °C)   BP  Min: 121/70  Max: 131/89   Pulse  Min: 78  Max: 106   Resp  Min: 16  Max: 22   SpO2  Min: 85 %  Max: 95 %   Flow (L/min)  Min: 60  Max: 60   Weight  Min: 159 kg (350 lb)  Max: 159 kg (350 lb)     GEN:  , appears ill, obese, A&O x3  HEENT: PERRL, EOMI, no icterus, mmm, no JVD, trachea midline, neck supple  CHEST: decreased ae bilat, no wheezes,  +bibasilar crackles, no use of accessory muscles  CV: RRR, no m/g/r  ABD: soft, nt, nd +bs, no hepatosplenomegaly  EXT: no c/c/e, trace  SKIN: no rashes, no xanthomas, nl turgor, warm, dry  LYMPH: no palpable cervical or supraclavicular lymphadenopathy  NEURO: CN 2-12 intact and symmetric bilaterally  PSYCH: nl affect, nl orientation, nl judgment, nl mood  MSK: some kyphosis, 5/5 strength ue and le bilaterally    Labs: 3/26: reviewed:  7.384/54/129  Glucose 168  Bun 21  Cr 1.04  Bicarb 29  Probnp 52  Wbc 10.4  Hgb 16.1  Plts 223    Ct angio: reviewed as in dictated report    A/P:  Acute hypoxemic respiratory failure - HIs degree of hypoxemia is out of proportion to PFT -- repeat echo with bubble to eval for shunt?   PNeumonia -- could just be atelectasis on ct -- will treat with antibiotics for now  Flank/chest pain -- suspect MSK or pleuritic. --will treat for pleurisy with small dose steorids.   Mixed obstructive and restrictive lung disease  Chronic hypercapnic respiratory failure  Chronic diastolic chf -- last echo looked pretty good, consider RHC?  Morbid obesity  8.  SHARMILA          Electronically signed by Dougie Lang MD at 03/26/24 3772

## 2024-03-27 NOTE — PROGRESS NOTES
Josh Spring   48 y.o.  male    LOS: 0 days   Patient Care Team:  Provider, No Known as PCP - General      Subjective   C/o cough  Review of Systems:   Lungs: + cough, no SOA  CV: No CP, no palpitations  GI: No nausea, vomiting, or diarrhea.     Medication Review:   Current Facility-Administered Medications:     acetaminophen (TYLENOL) tablet 650 mg, 650 mg, Oral, Q4H PRN **OR** acetaminophen (TYLENOL) 160 MG/5ML oral solution 650 mg, 650 mg, Oral, Q4H PRN **OR** acetaminophen (TYLENOL) suppository 650 mg, 650 mg, Rectal, Q4H PRN, Eduardo Luis MD    sennosides-docusate (PERICOLACE) 8.6-50 MG per tablet 2 tablet, 2 tablet, Oral, BID PRN **AND** polyethylene glycol (MIRALAX) packet 17 g, 17 g, Oral, Daily PRN **AND** bisacodyl (DULCOLAX) EC tablet 5 mg, 5 mg, Oral, Daily PRN **AND** bisacodyl (DULCOLAX) suppository 10 mg, 10 mg, Rectal, Daily PRN, Eduardo Luis MD    cefdinir (OMNICEF) capsule 300 mg, 300 mg, Oral, Q12H, Dougie Lang MD, 300 mg at 03/26/24 2037    furosemide (LASIX) injection 40 mg, 40 mg, Intravenous, BID, Puneet Xavier MD, 40 mg at 03/26/24 1821    HYDROcodone-acetaminophen (NORCO) 5-325 MG per tablet 1 tablet, 1 tablet, Oral, Q6H PRN, Eduardo Luis MD, 1 tablet at 03/26/24 2335    ipratropium-albuterol (DUO-NEB) nebulizer solution 3 mL, 3 mL, Nebulization, 4x Daily - RT, Dougie Lang MD, 3 mL at 03/27/24 0648    ipratropium-albuterol (DUO-NEB) nebulizer solution 3 mL, 3 mL, Nebulization, Q4H PRN, Eduardo Luis MD    lisinopril (PRINIVIL,ZESTRIL) tablet 10 mg, 10 mg, Oral, Q24H, Eduardo Luis MD, 10 mg at 03/26/24 1528    morphine injection 4 mg, 4 mg, Intravenous, Q4H PRN, Eduardo Luis MD, 4 mg at 03/26/24 1609    nystatin (MYCOSTATIN) 100,000 unit/mL suspension 500,000 Units, 500,000 Units, Swish & Swallow, 4x Daily, Eduardo Luis MD, 500,000 Units at 03/27/24 0942    ondansetron ODT (ZOFRAN-ODT) disintegrating tablet 4 mg, 4 mg, Oral, Q6H PRN **OR** ondansetron (ZOFRAN)  injection 4 mg, 4 mg, Intravenous, Q6H PRN, Eduardo Luis MD    predniSONE (DELTASONE) tablet 40 mg, 40 mg, Oral, Daily With Breakfast, Dougie Lang MD, 40 mg at 03/26/24 1527    sodium chloride 0.9 % flush 10 mL, 10 mL, Intravenous, PRN, Robbie Daniel MD    sodium chloride 0.9 % flush 10 mL, 10 mL, Intravenous, Q12H, Eduardo Luis MD, 10 mL at 03/26/24 2048    sodium chloride 0.9 % flush 10 mL, 10 mL, Intravenous, PRN, Eduardo Luis MD    sodium chloride 0.9 % infusion 40 mL, 40 mL, Intravenous, MATAN, Eduardo Luis MD      Objective     Vital Sign Min/Max for last 24 hours  Temp  Min: 97.7 °F (36.5 °C)  Max: 98.3 °F (36.8 °C)   BP  Min: 121/70  Max: 172/95    Pulse  Min: 73  Max: 93         03/26/24  0636 03/26/24  1335   Weight: (!) 159 kg (350 lb) (!) 160 kg (352 lb 4.7 oz)        Intake/Output Summary (Last 24 hours) at 3/27/2024 0942  Last data filed at 3/27/2024 0729  Gross per 24 hour   Intake 177 ml   Output 2650 ml   Net -2473 ml       Physical Exam:    General Appearance:    No acute distress   Lungs:     Decreased, crackles in bases. No wheezes, rhonchi or rales.     Heart:    Regular rate and rhythm.  Normal S1 and S2. No murmur, no gallop, rub or lift. , No JVD.   Abdomen:     Normal bowel sounds, soft, nontender, nondistended.   Extremities:   No clubbing, cyanosis or +edema.     Skin:   Warm, dry   Neurologic:   Awake       Monitor: sr  Results Review:     I reviewed the patient's new clinical results.    Sodium   Date Value Ref Range Status   03/27/2024 139 136 - 145 mmol/L Final   03/26/2024 138 136 - 145 mmol/L Final     Potassium   Date Value Ref Range Status   03/27/2024 5.0 3.5 - 5.2 mmol/L Final     Comment:     Slight hemolysis detected by analyzer. Result may be falsely elevated.   03/26/2024 4.8 3.5 - 5.2 mmol/L Final     Comment:     Specimen hemolyzed.  Result may be falsely elevated.     Chloride   Date Value Ref Range Status   03/27/2024 97 (L) 98 - 107 mmol/L Final  "  03/26/2024 100 98 - 107 mmol/L Final     No results found for: \"PLASMABICARB\"  BUN   Date Value Ref Range Status   03/27/2024 21 (H) 6 - 20 mg/dL Final   03/26/2024 21 (H) 6 - 20 mg/dL Final     Creatinine   Date Value Ref Range Status   03/27/2024 0.83 0.76 - 1.27 mg/dL Final   03/26/2024 1.04 0.76 - 1.27 mg/dL Final     Calcium   Date Value Ref Range Status   03/27/2024 9.5 8.6 - 10.5 mg/dL Final   03/26/2024 9.3 8.6 - 10.5 mg/dL Final     No results found for: \"MG\"    Results from last 7 days   Lab Units 03/27/24  0749   WBC 10*3/mm3 11.69*   HEMOGLOBIN g/dL 15.7   HEMATOCRIT % 47.4   PLATELETS 10*3/mm3 227     No results found for: \"CHOL\"  No results found for: \"HDL\"  No results found for: \"LDL\"  No results found for: \"TRIG\"      Results from last 7 days   Lab Units 03/26/24  0651   HSTROP T ng/L 16     Results from last 7 days   Lab Units 03/26/24  0651   PROBNP pg/mL 52.3               Assessment/ Plan    Acute hypoxic respiratory failure with chronic hypercapnic failure  PNA vs atelectasis  COPD  Chronic diastolic CHF    A. Echo  3/7/24: Left ventricular systolic function is hyperdynamic (EF > 70%). Calculated left ventricular EF = 78.8% Normal left ventricular cavity size noted. Left ventricular wall thickness is consistent with severe concentric hypertrophy. All left ventricular wall segments contract normally. Left ventricular diastolic function was normal.   The right ventricular cavity is mildly dilated. Normal right ventricular systolic function noted.  The left atrial cavity is moderately dilated. The right atrial cavity is moderately dilated.   Trace tricuspid valve regurgitation is present. Insufficient TR velocity profile to estimate the right ventricular systolic pressure.   Borderline dilation of the ascending aorta is present. Aortic arch = 3.7 cm       Continue diuresis.  When more stable right heart catheterization will be considered, will need to ask [facility] to do the " procedure  Further recs per Dr. Xavier.     Time:  20 min  Patient seen and examined  I reviewed Gisela Chadwick's note and I agree with the assessment and accept the inherent risks  Will switch to p.o. diuretics in the morning

## 2024-03-27 NOTE — PLAN OF CARE
Goal Outcome Evaluation:              Outcome Evaluation: Patient continues on AirVo; 60L, 100%. He did wear Bipap from about 2330 until 0415. Occassional left flank/lateral lower chest pain. Medication and repositioning helped relieve pain. Patient did begin to have a productive cough. Sputum thick and bloody. No other issues overnight.

## 2024-03-27 NOTE — PROGRESS NOTES
"DAILY PROGRESS NOTE  [facility]    Patient Identification:  Name: Josh Hinojosa  Age: 48 y.o.  Sex: male  :  1975  MRN: [id number]         Primary Care Physician: Provider, No Known    Subjective:  Interval History: He is still very short of air and oxygen on 60 L Airvo.    Objective:    Scheduled Meds:cefdinir, 300 mg, Oral, Q12H  furosemide, 40 mg, Intravenous, BID  ipratropium-albuterol, 3 mL, Nebulization, 4x Daily - RT  lisinopril, 10 mg, Oral, Q24H  nystatin, 500,000 Units, Swish & Swallow, 4x Daily  predniSONE, 40 mg, Oral, Daily With Breakfast  sodium chloride, 10 mL, Intravenous, Q12H      Continuous Infusions:     Vital signs in last 24 hours:  Temp:  [97.7 °F (36.5 °C)-98.3 °F (36.8 °C)] 98.1 °F (36.7 °C)  Heart Rate:  [73-93] 86  Resp:  [18-20] 20  BP: (128-172)/(73-95) 131/95    Intake/Output:    Intake/Output Summary (Last 24 hours) at 3/27/2024 1641  Last data filed at 3/27/2024 1332  Gross per 24 hour   Intake 417 ml   Output 3700 ml   Net -3283 ml       Exam:  /95   Pulse 86   Temp 98.1 °F (36.7 °C) (Oral)   Resp 20   Ht 175.3 cm (69\")   Wt (!) 160 kg (352 lb 4.7 oz)   SpO2 91%   BMI 52.02 kg/m²     General Appearance:    Alert, cooperative, no distress   Head:    Normocephalic, without obvious abnormality, atraumatic   Eyes:       Throat:   Lips, tongue, gums normal   Neck:   Supple, symmetrical, trachea midline, no JVD   Lungs:     Clear to auscultation bilaterally, respirations unlabored   Chest Wall:    No tenderness or deformity    Heart:    Regular rate and rhythm, S1 and S2 normal, no murmur,no  Rub or gallop   Abdomen:     Soft, nontender, bowel sounds active, no masses, no organomegaly    Extremities:   Extremities normal, atraumatic, no cyanosis or edema   Pulses:      Skin:   Skin is warm and dry,  no rashes or palpable lesions   Neurologic:   no focal deficits noted      Lab Results (last 72 hours)       Procedure Component Value Units Date/Time    " Respiratory Culture - Sputum, Cough [[id number]] Collected: 03/27/24 0414    Specimen: Sputum from Cough Updated: 03/27/24 1317     Respiratory Culture Rejected     Gram Stain Rare (1+) WBCs per low power field      Rare (1+) Epithelial cells per low power field      Few (2+) Mixed bacterial morphotypes seen on Gram Stain    Narrative:      Specimen rejected due to oropharyngeal contamination. Please reorder and recollect specimen if clinically necessary.    Basic Metabolic Panel [[id number]]  (Abnormal) Collected: 03/27/24 0735    Specimen: Blood Updated: 03/27/24 0803     Glucose 132 mg/dL      BUN 21 mg/dL      Creatinine 0.83 mg/dL      Sodium 139 mmol/L      Potassium 5.0 mmol/L      Comment: Slight hemolysis detected by analyzer. Result may be falsely elevated.        Chloride 97 mmol/L      CO2 34.0 mmol/L      Calcium 9.5 mg/dL      BUN/Creatinine Ratio 25.3     Anion Gap 8.0 mmol/L      eGFR 108.6 mL/min/1.73     Narrative:      GFR Normal >60  Chronic Kidney Disease <60  Kidney Failure <15      CBC & Differential [[id number]]  (Abnormal) Collected: 03/27/24 0749    Specimen: Blood Updated: 03/27/24 0754    Narrative:      The following orders were created for panel order CBC & Differential.  Procedure                               Abnormality         Status                     ---------                               -----------         ------                     CBC Auto Differential[[id number]]        Abnormal            Final result                 Please view results for these tests on the individual orders.    CBC Auto Differential [[id number]]  (Abnormal) Collected: 03/27/24 0749    Specimen: Blood Updated: 03/27/24 0754     WBC 11.69 10*3/mm3      RBC 5.16 10*6/mm3      Hemoglobin 15.7 g/dL      Hematocrit 47.4 %      MCV 91.9 fL      MCH 30.4 pg      MCHC 33.1 g/dL      RDW 12.9 %      RDW-SD 43.3 fl      MPV 10.3 fL      Platelets 227 10*3/mm3      Neutrophil % 81.7 %      Lymphocyte % 10.6 %       Monocyte % 6.7 %      Eosinophil % 0.3 %      Basophil % 0.2 %      Immature Grans % 0.5 %      Neutrophils, Absolute 9.55 10*3/mm3      Lymphocytes, Absolute 1.24 10*3/mm3      Monocytes, Absolute 0.78 10*3/mm3      Eosinophils, Absolute 0.04 10*3/mm3      Basophils, Absolute 0.02 10*3/mm3      Immature Grans, Absolute 0.06 10*3/mm3      nRBC 0.0 /100 WBC     Respiratory Panel PCR w/COVID-19(SARS-CoV-2) ABEL/DEBBY/FER/PAD/[facility]DR[facility] In-House, NP Swab in UTM/VTM, 2 HR TAT - Swab, Nasopharynx [[id number]]  (Normal) Collected: 03/26/24 1633    Specimen: Swab from Nasopharynx Updated: 03/26/24 1812     ADENOVIRUS, PCR Not Detected     Coronavirus 229E Not Detected     Coronavirus HKU1 Not Detected     Coronavirus NL63 Not Detected     Coronavirus OC43 Not Detected     COVID19 Not Detected     Human Metapneumovirus Not Detected     Human Rhinovirus/Enterovirus Not Detected     Influenza A PCR Not Detected     Influenza B PCR Not Detected     Parainfluenza Virus 1 Not Detected     Parainfluenza Virus 2 Not Detected     Parainfluenza Virus 3 Not Detected     Parainfluenza Virus 4 Not Detected     RSV, PCR Not Detected     Bordetella pertussis pcr Not Detected     Bordetella parapertussis PCR Not Detected     Chlamydophila pneumoniae PCR Not Detected     Mycoplasma pneumo by PCR Not Detected    Narrative:      In the setting of a positive respiratory panel with a viral infection PLUS a negative procalcitonin without other underlying concern for bacterial infection, consider observing off antibiotics or discontinuation of antibiotics and continue supportive care. If the respiratory panel is positive for atypical bacterial infection (Bordetella pertussis, Chlamydophila pneumoniae, or Mycoplasma pneumoniae), consider antibiotic de-escalation to target atypical bacterial infection.    S. Pneumo Ag Urine or CSF - Urine, Urine, Clean Catch [[id number]]  (Normal) Collected: 03/26/24 1633    Specimen: Urine, Clean Catch Updated: 03/26/24  "1740     Strep Pneumo Ag Negative    Procalcitonin [[id number]]  (Normal) Collected: 03/26/24 0651    Specimen: Blood Updated: 03/26/24 1010     Procalcitonin 0.06 ng/mL     Narrative:      As a Marker for Sepsis (Non-Neonates):    1. <0.5 ng/mL represents a low risk of severe sepsis and/or septic shock.  2. >2 ng/mL represents a high risk of severe sepsis and/or septic shock.    As a Marker for Lower Respiratory Tract Infections that require antibiotic therapy:    PCT on Admission    Antibiotic Therapy       6-12 Hrs later    >0.5                Strongly Recommended  >0.25 - <0.5        Recommended   0.1 - 0.25          Discouraged              Remeasure/reassess PCT  <0.1                Strongly Discouraged     Remeasure/reassess PCT    As 28 day mortality risk marker: \"Change in Procalcitonin Result\" (>80% or <=80%) if Day 0 (or Day 1) and Day 4 values are available. Refer to http://www.[facility]pct[facility]cal[email or web address]    Change in PCT <=80%  A decrease of PCT levels below or equal to 80% defines a positive change in PCT test result representing a higher risk for 28-day all-cause mortality of patients diagnosed with severe sepsis for septic shock.    Change in PCT >80%  A decrease of PCT levels of more than 80% defines a negative change in PCT result representing a lower risk for 28-day all-cause mortality of patients diagnosed with severe sepsis or septic shock.       Blood Gas, Arterial - [[id number]]  (Abnormal) Collected: 03/26/24 0824    Specimen: Arterial Blood Updated: 03/26/24 0829     Site Right Radial     Omar's Test Positive     pH, Arterial 7.384 pH units      pCO2, Arterial 54.4 mm Hg      pO2, Arterial 129.1 mm Hg      HCO3, Arterial 32.5 mmol/L      Base Excess, Arterial 5.3 mmol/L      Comment: Serial Number: [id number]:  [id number]        O2 Saturation, Arterial 98.8 %      CO2 Content 34.2 mmol/L      Barometric Pressure for Blood Gas 739.3000 mmHg      Modality NRB     Flow Rate 15.0000 lpm      Rate 16 " Breaths/minute      Hemodilution No     Device Comment Sat 88%, ID [id number]    [place] Draw [[id number]] Collected: 03/26/24 0651    Specimen: Blood Updated: 03/26/24 0827    Narrative:      The following orders were created for panel order [place] Draw.  Procedure                               Abnormality         Status                     ---------                               -----------         ------                     Green Top (Gel)[[id number]]                                  Final result               Lavender Top[[id number]]                                     Final result               Gold Top - SST[[id number]]                                                              Light Blue Top[[id number]]                                   Final result                 Please view results for these tests on the individual orders.    Green Top (Gel) [[id number]] Collected: 03/26/24 0651    Specimen: Blood Updated: 03/26/24 0800     Extra Tube Hold for add-ons.     Comment: Auto resulted.       Lavender Top [[id number]] Collected: 03/26/24 0651    Specimen: Blood Updated: 03/26/24 0800     Extra Tube hold for add-on     Comment: Auto resulted       Light Blue Top [[id number]] Collected: 03/26/24 0651    Specimen: Blood Updated: 03/26/24 0800     Extra Tube Hold for add-ons.     Comment: Auto resulted       Comprehensive Metabolic Panel [[id number]]  (Abnormal) Collected: 03/26/24 0651    Specimen: Blood Updated: 03/26/24 0727     Glucose 168 mg/dL      BUN 21 mg/dL      Creatinine 1.04 mg/dL      Sodium 138 mmol/L      Potassium 4.8 mmol/L      Comment: Specimen hemolyzed.  Result may be falsely elevated.        Chloride 100 mmol/L      CO2 29.0 mmol/L      Calcium 9.3 mg/dL      Total Protein 7.0 g/dL      Albumin 4.0 g/dL      ALT (SGPT) 34 U/L      Comment: Specimen hemolyzed.  Result may  be falsely elevated.        AST (SGOT) 21 U/L      Comment: Specimen hemolyzed.  Result may be falsely elevated.        Alkaline  Phosphatase 58 U/L      Total Bilirubin 0.4 mg/dL      Globulin 3.0 gm/dL      A/G Ratio 1.3 g/dL      BUN/Creatinine Ratio 20.2     Anion Gap 9.0 mmol/L      eGFR 89.1 mL/min/1.73     Narrative:      GFR Normal >60  Chronic Kidney Disease <60  Kidney Failure <15      Single High Sensitivity Troponin T [[id number]]  (Normal) Collected: 03/26/24 0651    Specimen: Blood Updated: 03/26/24 0725     HS Troponin T 16 ng/L     Narrative:      High Sensitive Troponin T Reference Range:  <14.0 ng/L- Negative Female for AMI  <22.0 ng/L- Negative Male for AMI  >=14 - Abnormal Female indicating possible myocardial injury.  >=22 - Abnormal Male indicating possible myocardial injury.   Clinicians would have to utilize clinical acumen, EKG, Troponin, and serial changes to determine if it is an Acute Myocardial Infarction or myocardial injury due to an underlying chronic condition.         BNP [[id number]]  (Normal) Collected: 03/26/24 0651    Specimen: Blood Updated: 03/26/24 0724     proBNP 52.3 pg/mL     Narrative:      This assay is used as an aid in the diagnosis of individuals suspected of having heart failure. It can be used as an aid in the diagnosis of acute decompensated heart failure (ADHF) in patients presenting with signs and symptoms of ADHF to the emergency department (ED). In addition, NT-proBNP of <300 pg/mL indicates ADHF is not likely.    Age Range Result Interpretation  NT-proBNP Concentration (pg/mL:      <50             Positive            >450                   Gray                 300-450                    Negative             <300    50-75           Positive            >900                  Gray                300-900                  Negative            <300      >75             Positive            >1800                  Gray                300-1800                  Negative            <300    CBC & Differential [[id number]]  (Abnormal) Collected: 03/26/24 0651    Specimen: Blood Updated: 03/26/24 0706     Narrative:      The following orders were created for panel order CBC & Differential.  Procedure                               Abnormality         Status                     ---------                               -----------         ------                     CBC Auto Differential[[id number]]        Abnormal            Final result                 Please view results for these tests on the individual orders.    CBC Auto Differential [[id number]]  (Abnormal) Collected: 03/26/24 0651    Specimen: Blood Updated: 03/26/24 0706     WBC 10.37 10*3/mm3      RBC 5.25 10*6/mm3      Hemoglobin 16.1 g/dL      Hematocrit 48.4 %      MCV 92.2 fL      MCH 30.7 pg      MCHC 33.3 g/dL      RDW 13.1 %      RDW-SD 44.2 fl      MPV 9.7 fL      Platelets 223 10*3/mm3      Neutrophil % 72.5 %      Lymphocyte % 17.6 %      Monocyte % 6.9 %      Eosinophil % 2.0 %      Basophil % 0.4 %      Immature Grans % 0.6 %      Neutrophils, Absolute 7.51 10*3/mm3      Lymphocytes, Absolute 1.83 10*3/mm3      Monocytes, Absolute 0.72 10*3/mm3      Eosinophils, Absolute 0.21 10*3/mm3      Basophils, Absolute 0.04 10*3/mm3      Immature Grans, Absolute 0.06 10*3/mm3      nRBC 0.0 /100 WBC           Data Review:  Results from last 7 days   Lab Units 03/27/24  0735 03/26/24  0651   SODIUM mmol/L 139 138   POTASSIUM mmol/L 5.0 4.8   CHLORIDE mmol/L 97* 100   CO2 mmol/L 34.0* 29.0   BUN mg/dL 21* 21*   CREATININE mg/dL 0.83 1.04   GLUCOSE mg/dL 132* 168*   CALCIUM mg/dL 9.5 9.3     Results from last 7 days   Lab Units 03/27/24  0749 03/26/24  0651   WBC 10*3/mm3 11.69* 10.37   HEMOGLOBIN g/dL 15.7 16.1   HEMATOCRIT % 47.4 48.4   PLATELETS 10*3/mm3 227 223             Lab Results   Lab Value Date/Time    TROPONINT 16 03/26/2024 0651    TROPONINT 44 (H) 03/07/2024 1742    TROPONINT 38 (H) 03/07/2024 1410         Results from last 7 days   Lab Units 03/26/24  0651   ALK PHOS U/L 58   BILIRUBIN mg/dL 0.4   ALT (SGPT) U/L 34   AST (SGOT) U/L 21             No  "results found for: \"POCGLU\"        Past Medical History:   Diagnosis Date    COPD (chronic obstructive pulmonary disease)     Hypertension        Assessment:  Active Hospital Problems    Diagnosis  POA    **Acute on chronic respiratory failure with hypoxia and hypercapnia [J96.21, J96.22]  Yes    Pleuritic chest pain [R07.81]  Unknown      Resolved Hospital Problems   No resolved problems to display.       Plan:  Pulmonary consult noted and appreciated.  Continue with antibiotics oxygen weaning if able and bronchodilators with steroids.  Follow-up on labs and cultures.  Cardiology consult noted.    Eduardo Luis MD  3/27/2024  16:41 EDT    "

## 2024-03-27 NOTE — PROGRESS NOTES
[facility]     Mar/chart reviewed  Follow up acute on chronic hypoxemic respiratory failure  Some cough with sputum  Some flank pain    Vital Sign Min/Max for last 24 hours  Temp  Min: 97.7 °F (36.5 °C)  Max: 98.3 °F (36.8 °C)   BP  Min: 121/70  Max: 172/95   Pulse  Min: 73  Max: 96   Resp  Min: 16  Max: 22   SpO2  Min: 89 %  Max: 95 %   Flow (L/min)  Min: 60  Max: 600   Weight  Min: 160 kg (352 lb 4.7 oz)  Max: 160 kg (352 lb 4.7 oz)   177/2000  Appears ill, axox3,   perrl, eomi, normal sclera  mmm, no jvd, trachea midline, neck supple,  chest decreased ae bilaterally, + crackles, no wheezes,   rrr,   soft, nt, nd +bs,  no c/c/ 1+ edema  Skin warm, dry no rashes    Labs: 3/27: reviewed:  Cbc, bmp pending  3/26:   Urine strep neg  Rpp neg    A/P:  Acute hypoxemic respiratory failure - HIs degree of hypoxemia is out of proportion to PFT -- repeat echo with bubble to eval for shunt?   PNeumonia -- could just be atelectasis on ct -- will treat with antibiotics for now  Flank/chest pain -- suspect MSK or pleuritic. --will treat for pleurisy with small dose steorids.   Mixed obstructive and restrictive lung disease  Chronic hypercapnic respiratory failure  Chronic diastolic chf -- last echo looked pretty good, consider RHC?  Morbid obesity  8.  SHARMILA    Agree with need for RHC, hopefully can get his oxygen requirement imprved a bit prior to that.

## 2024-03-27 NOTE — PLAN OF CARE
Problem: Adult Inpatient Plan of Care  Goal: Plan of Care Review  Outcome: Ongoing, Progressing  Flowsheets (Taken 3/27/2024 1812)  Progress: improving  Plan of Care Reviewed With: patient  Goal: Patient-Specific Goal (Individualized)  Outcome: Ongoing, Progressing  Goal: Absence of Hospital-Acquired Illness or Injury  Outcome: Ongoing, Progressing  Goal: Optimal Comfort and Wellbeing  Outcome: Ongoing, Progressing  Intervention: Provide Person-Centered Care  Recent Flowsheet Documentation  Taken 3/27/2024 0800 by Amanda Colbert RN  Trust Relationship/Rapport:   care explained   choices provided   emotional support provided   questions answered   questions encouraged  Goal: Readiness for Transition of Care  Outcome: Ongoing, Progressing     Problem: Noninvasive Ventilation Acute  Goal: Effective Unassisted Ventilation and Oxygenation  Outcome: Ongoing, Progressing   Goal Outcome Evaluation:  Plan of Care Reviewed With: patient        Progress: improving    AAO pleasant male. Denies c/o pain. On HGF @ 60%. Michelle well.lung sounds with crackles in bases. Large obese male. IS given instructed how and when to use. Has sores on tongue. IV dressing on r AC changed. Void via urinal. Eat meals.     Report to floor transferred on 100% NRB.

## 2024-03-28 PROBLEM — E66.01 MORBID OBESITY WITH BMI OF 50.0-59.9, ADULT: Status: ACTIVE | Noted: 2024-03-28

## 2024-03-28 PROBLEM — J96.12 CHRONIC RESPIRATORY FAILURE WITH HYPERCAPNIA: Status: ACTIVE | Noted: 2024-03-26

## 2024-03-28 PROBLEM — I50.33 ACUTE ON CHRONIC DIASTOLIC CONGESTIVE HEART FAILURE: Status: ACTIVE | Noted: 2024-03-28

## 2024-03-28 PROBLEM — G47.33 OBSTRUCTIVE SLEEP APNEA: Status: ACTIVE | Noted: 2024-03-28

## 2024-03-28 PROBLEM — J96.01 ACUTE RESPIRATORY FAILURE WITH HYPOXIA: Status: ACTIVE | Noted: 2024-03-28

## 2024-03-28 PROBLEM — J15.9 BACTERIAL PNEUMONIA: Status: ACTIVE | Noted: 2024-03-28

## 2024-03-28 LAB
ANION GAP SERPL CALCULATED.3IONS-SCNC: 10 MMOL/L (ref 5–15)
BASOPHILS # BLD AUTO: 0.03 10*3/MM3 (ref 0–0.2)
BASOPHILS NFR BLD AUTO: 0.3 % (ref 0–1.5)
BUN SERPL-MCNC: 20 MG/DL (ref 6–20)
BUN/CREAT SERPL: 24.4 (ref 7–25)
CALCIUM SPEC-SCNC: 8.7 MG/DL (ref 8.6–10.5)
CHLORIDE SERPL-SCNC: 99 MMOL/L (ref 98–107)
CO2 SERPL-SCNC: 32 MMOL/L (ref 22–29)
CREAT SERPL-MCNC: 0.82 MG/DL (ref 0.76–1.27)
DEPRECATED RDW RBC AUTO: 44.3 FL (ref 37–54)
EGFRCR SERPLBLD CKD-EPI 2021: 109 ML/MIN/1.73
EOSINOPHIL # BLD AUTO: 0.12 10*3/MM3 (ref 0–0.4)
EOSINOPHIL NFR BLD AUTO: 1.2 % (ref 0.3–6.2)
ERYTHROCYTE [DISTWIDTH] IN BLOOD BY AUTOMATED COUNT: 13.1 % (ref 12.3–15.4)
GLUCOSE SERPL-MCNC: 93 MG/DL (ref 65–99)
HCT VFR BLD AUTO: 46.6 % (ref 37.5–51)
HGB BLD-MCNC: 15.1 G/DL (ref 13–17.7)
IMM GRANULOCYTES # BLD AUTO: 0.05 10*3/MM3 (ref 0–0.05)
IMM GRANULOCYTES NFR BLD AUTO: 0.5 % (ref 0–0.5)
LYMPHOCYTES # BLD AUTO: 2.86 10*3/MM3 (ref 0.7–3.1)
LYMPHOCYTES NFR BLD AUTO: 29.7 % (ref 19.6–45.3)
MCH RBC QN AUTO: 30.1 PG (ref 26.6–33)
MCHC RBC AUTO-ENTMCNC: 32.4 G/DL (ref 31.5–35.7)
MCV RBC AUTO: 92.8 FL (ref 79–97)
MONOCYTES # BLD AUTO: 0.62 10*3/MM3 (ref 0.1–0.9)
MONOCYTES NFR BLD AUTO: 6.4 % (ref 5–12)
NEUTROPHILS NFR BLD AUTO: 5.95 10*3/MM3 (ref 1.7–7)
NEUTROPHILS NFR BLD AUTO: 61.9 % (ref 42.7–76)
NRBC BLD AUTO-RTO: 0 /100 WBC (ref 0–0.2)
PLATELET # BLD AUTO: 190 10*3/MM3 (ref 140–450)
PMV BLD AUTO: 10.1 FL (ref 6–12)
POTASSIUM SERPL-SCNC: 4 MMOL/L (ref 3.5–5.2)
RBC # BLD AUTO: 5.02 10*6/MM3 (ref 4.14–5.8)
SODIUM SERPL-SCNC: 141 MMOL/L (ref 136–145)
WBC NRBC COR # BLD AUTO: 9.63 10*3/MM3 (ref 3.4–10.8)

## 2024-03-28 PROCEDURE — 94799 UNLISTED PULMONARY SVC/PX: CPT

## 2024-03-28 PROCEDURE — 85025 COMPLETE CBC W/AUTO DIFF WBC: CPT | Performed by: HOSPITALIST

## 2024-03-28 PROCEDURE — 63710000001 PREDNISONE PER 1 MG: Performed by: INTERNAL MEDICINE

## 2024-03-28 PROCEDURE — 80048 BASIC METABOLIC PNL TOTAL CA: CPT | Performed by: HOSPITALIST

## 2024-03-28 PROCEDURE — 94760 N-INVAS EAR/PLS OXIMETRY 1: CPT

## 2024-03-28 PROCEDURE — 94761 N-INVAS EAR/PLS OXIMETRY MLT: CPT

## 2024-03-28 PROCEDURE — 94664 DEMO&/EVAL PT USE INHALER: CPT

## 2024-03-28 PROCEDURE — 25010000002 FUROSEMIDE PER 20 MG: Performed by: INTERNAL MEDICINE

## 2024-03-28 RX ORDER — FUROSEMIDE 40 MG/1
40 TABLET ORAL
Status: DISCONTINUED | OUTPATIENT
Start: 2024-03-28 | End: 2024-03-31 | Stop reason: HOSPADM

## 2024-03-28 RX ADMIN — NYSTATIN 500000 UNITS: 100000 SUSPENSION ORAL at 08:18

## 2024-03-28 RX ADMIN — CEFDINIR 300 MG: 300 CAPSULE ORAL at 22:14

## 2024-03-28 RX ADMIN — FUROSEMIDE 40 MG: 40 TABLET ORAL at 17:46

## 2024-03-28 RX ADMIN — CEFDINIR 300 MG: 300 CAPSULE ORAL at 08:18

## 2024-03-28 RX ADMIN — Medication 10 ML: at 22:14

## 2024-03-28 RX ADMIN — LISINOPRIL 10 MG: 10 TABLET ORAL at 08:18

## 2024-03-28 RX ADMIN — NYSTATIN 500000 UNITS: 100000 SUSPENSION ORAL at 17:46

## 2024-03-28 RX ADMIN — NYSTATIN 500000 UNITS: 100000 SUSPENSION ORAL at 12:36

## 2024-03-28 RX ADMIN — Medication 10 ML: at 08:30

## 2024-03-28 RX ADMIN — IPRATROPIUM BROMIDE AND ALBUTEROL SULFATE 3 ML: .5; 3 SOLUTION RESPIRATORY (INHALATION) at 06:50

## 2024-03-28 RX ADMIN — PREDNISONE 40 MG: 20 TABLET ORAL at 08:18

## 2024-03-28 RX ADMIN — IPRATROPIUM BROMIDE AND ALBUTEROL SULFATE 3 ML: .5; 3 SOLUTION RESPIRATORY (INHALATION) at 22:47

## 2024-03-28 RX ADMIN — NYSTATIN 500000 UNITS: 100000 SUSPENSION ORAL at 22:13

## 2024-03-28 RX ADMIN — IPRATROPIUM BROMIDE AND ALBUTEROL SULFATE 3 ML: .5; 3 SOLUTION RESPIRATORY (INHALATION) at 14:33

## 2024-03-28 RX ADMIN — FUROSEMIDE 40 MG: 10 INJECTION, SOLUTION INTRAMUSCULAR; INTRAVENOUS at 08:18

## 2024-03-28 RX ADMIN — IPRATROPIUM BROMIDE AND ALBUTEROL SULFATE 3 ML: .5; 3 SOLUTION RESPIRATORY (INHALATION) at 10:35

## 2024-03-28 NOTE — PLAN OF CARE
Goal Outcome Evaluation:  Plan of Care Reviewed With: patient              No acute distress noted this shift, high flow at 60L continues, safety maintained, continue plan of care

## 2024-03-28 NOTE — PROGRESS NOTES
Josh Spring   48 y.o.  male    LOS: 1 day   Patient Care Team:  Provider, No Known as PCP - General      Subjective     Interval History:   Sitting on a chair  Patient Complaints:     Review of Systems:       Medication Review:   Current Facility-Administered Medications:     acetaminophen (TYLENOL) tablet 650 mg, 650 mg, Oral, Q4H PRN **OR** acetaminophen (TYLENOL) 160 MG/5ML oral solution 650 mg, 650 mg, Oral, Q4H PRN **OR** acetaminophen (TYLENOL) suppository 650 mg, 650 mg, Rectal, Q4H PRN, Eduardo Luis MD    sennosides-docusate (PERICOLACE) 8.6-50 MG per tablet 2 tablet, 2 tablet, Oral, BID PRN **AND** polyethylene glycol (MIRALAX) packet 17 g, 17 g, Oral, Daily PRN **AND** bisacodyl (DULCOLAX) EC tablet 5 mg, 5 mg, Oral, Daily PRN **AND** bisacodyl (DULCOLAX) suppository 10 mg, 10 mg, Rectal, Daily PRN, Eduardo Luis MD    cefdinir (OMNICEF) capsule 300 mg, 300 mg, Oral, Q12H, Dougie Lang MD, 300 mg at 03/28/24 0818    furosemide (LASIX) injection 40 mg, 40 mg, Intravenous, BID, Puneet Xavier MD, 40 mg at 03/28/24 0818    HYDROcodone-acetaminophen (NORCO) 5-325 MG per tablet 1 tablet, 1 tablet, Oral, Q6H PRN, Eduardo Luis MD, 1 tablet at 03/26/24 2335    ipratropium-albuterol (DUO-NEB) nebulizer solution 3 mL, 3 mL, Nebulization, 4x Daily - RT, Dougie Lang MD, 3 mL at 03/28/24 0650    ipratropium-albuterol (DUO-NEB) nebulizer solution 3 mL, 3 mL, Nebulization, Q4H PRN, Eduardo Luis MD    lisinopril (PRINIVIL,ZESTRIL) tablet 10 mg, 10 mg, Oral, Q24H, Eduardo Luis MD, 10 mg at 03/28/24 0818    morphine injection 4 mg, 4 mg, Intravenous, Q4H PRN, Eduardo Luis MD, 4 mg at 03/26/24 1609    nystatin (MYCOSTATIN) 100,000 unit/mL suspension 500,000 Units, 500,000 Units, Swish & Swallow, 4x Daily, Eduardo Luis MD, 500,000 Units at 03/28/24 0818    ondansetron ODT (ZOFRAN-ODT) disintegrating tablet 4 mg, 4 mg, Oral, Q6H PRN **OR** ondansetron (ZOFRAN) injection 4 mg, 4 mg, Intravenous,  Q6H PRN, Eduardo Luis MD    predniSONE (DELTASONE) tablet 40 mg, 40 mg, Oral, Daily With Breakfast, Dougie Lang MD, 40 mg at 03/28/24 0818    sodium chloride 0.9 % flush 10 mL, 10 mL, Intravenous, PRN, Robbie Daniel MD    sodium chloride 0.9 % flush 10 mL, 10 mL, Intravenous, Q12H, Eduardo Luis MD, 10 mL at 03/28/24 0830    sodium chloride 0.9 % flush 10 mL, 10 mL, Intravenous, PRN, Eduardo Luis MD    sodium chloride 0.9 % infusion 40 mL, 40 mL, Intravenous, MATAN, Eduardo Luis MD      Objective   Vital Sign Min/Max for last 24 hours  Temp  Min: 98.1 °F (36.7 °C)  Max: 98.6 °F (37 °C)   BP  Min: 117/80  Max: 164/94    Pulse  Min: 70  Max: 94     Wt Readings from Last 3 Encounters:   03/28/24 (!) 161 kg (355 lb 9.6 oz)   03/13/24 (!) 159 kg (349 lb 12.8 oz)        Intake/Output Summary (Last 24 hours) at 3/28/2024 1019  Last data filed at 3/28/2024 0830  Gross per 24 hour   Intake 600 ml   Output 3050 ml   Net -2450 ml     Physical Exam:      General Appearance:    Well developed and well nourished in no acute distress   Head:    Normocephalic, atraumatic   Eyes:            Conjunctivae normal, anicteric, no xanthelasma   Neck:   supple, trachea midline, no thyromegaly, no carotid bruit, no JVD, no elevated CVP   Lungs:     Clear to auscultation,respirations regular, even and                unlabored    Heart:    Regular rhythm and normal rate, normal S1 and S2,            No murmur, no gallop, no rub, no click   Chest Wall:    No abnormalities observed   Abdomen:     Normal bowel sounds, soft, nondistended           Rectal:     Deferred   Extremities:   No edema. Moves all extremities well, no cyanosis, no erythema   Pulses:   Pulses palpable and equal bilaterally   Skin:   No bleeding, bruising or rash   Neurologic:   awake alert and oriented x3, speech clear and approp, no facial drooping     :    Monitor:      Results Review:         Sodium Sodium   Date Value Ref Range Status   03/28/2024 141  "136 - 145 mmol/L Final   03/27/2024 139 136 - 145 mmol/L Final   03/26/2024 138 136 - 145 mmol/L Final      Potassium Potassium   Date Value Ref Range Status   03/28/2024 4.0 3.5 - 5.2 mmol/L Final     Comment:     Slight hemolysis detected by analyzer. Result may be falsely elevated.   03/27/2024 5.0 3.5 - 5.2 mmol/L Final     Comment:     Slight hemolysis detected by analyzer. Result may be falsely elevated.   03/26/2024 4.8 3.5 - 5.2 mmol/L Final     Comment:     Specimen hemolyzed.  Result may be falsely elevated.      Chloride Chloride   Date Value Ref Range Status   03/28/2024 99 98 - 107 mmol/L Final   03/27/2024 97 (L) 98 - 107 mmol/L Final   03/26/2024 100 98 - 107 mmol/L Final      Bicarbonate No results found for: \"PLASMABICARB\"   BUN BUN   Date Value Ref Range Status   03/28/2024 20 6 - 20 mg/dL Final   03/27/2024 21 (H) 6 - 20 mg/dL Final   03/26/2024 21 (H) 6 - 20 mg/dL Final      Creatinine Creatinine   Date Value Ref Range Status   03/28/2024 0.82 0.76 - 1.27 mg/dL Final   03/27/2024 0.83 0.76 - 1.27 mg/dL Final   03/26/2024 1.04 0.76 - 1.27 mg/dL Final      Calcium Calcium   Date Value Ref Range Status   03/28/2024 8.7 8.6 - 10.5 mg/dL Final   03/27/2024 9.5 8.6 - 10.5 mg/dL Final   03/26/2024 9.3 8.6 - 10.5 mg/dL Final      Magnesium No results found for: \"MG\"     Results from last 7 days   Lab Units 03/28/24  0411   WBC 10*3/mm3 9.63   HEMOGLOBIN g/dL 15.1   HEMATOCRIT % 46.6   PLATELETS 10*3/mm3 190     Lab Results   Lab Value Date/Time    TROPONINT 16 03/26/2024 0651    TROPONINT 44 (H) 03/07/2024 1742    TROPONINT 38 (H) 03/07/2024 1410            Echo EF Estimated  No results found for: \"ECHOEFEST\"      Assessment/ Plan  Assessment & Plan   Active Hospital Problems    Diagnosis  POA    **Acute on chronic respiratory failure with hypoxia and hypercapnia [J96.21, J96.22]  Yes    Pleuritic chest pain [R07.81]  Unknown       Acute hypoxic respiratory failure with chronic hypercapnic failure  PNA " vs atelectasis  COPD  Chronic diastolic CHF    A. Echo  3/7/24: Left ventricular systolic function is hyperdynamic (EF > 70%). Calculated left ventricular EF = 78.8% Normal left ventricular cavity size noted. Left ventricular wall thickness is consistent with severe concentric hypertrophy. All left ventricular wall segments contract normally. Left ventricular diastolic function was normal.   The right ventricular cavity is mildly dilated. Normal right ventricular systolic function noted.  The left atrial cavity is moderately dilated. The right atrial cavity is moderately dilated.   Trace tricuspid valve regurgitation is present. Insufficient TR velocity profile to estimate the right ventricular systolic pressure.   Borderline dilation of the ascending aorta is present. Aortic arch = 3.7 cm        Will switch to p.o. Lasix 40 mg twice a day  When more stable right heart catheterization will be considered, will need to ask [place] [facility]ology to do the procedure  Will order repeat echo with bubble study  I offered a right heart cath, patient is somewhat reluctant.  He want to think about it before consenting to the procedure.      Puneet Xavier MD  03/28/24  10:19 EDT

## 2024-03-28 NOTE — CASE MANAGEMENT/SOCIAL WORK
Continued Stay Note  [facility]     Patient Name: Josh Hinojosa  MRN: [id number]  Today's Date: 3/28/2024    Admit Date: 3/26/2024    Plan: Home, family/friend to transport   Discharge Plan       Row Name 03/28/24 1018       Plan    Plan Home, family/friend to transport    Plan Comments Met with pt at bedside. Introduced self and explained role of . Confirmed previous discharge planning notes. Pt stated that his family or a friend will transport home. Advised that CCP is available should any further needs arise.                   Discharge Codes    No documentation.                 Expected Discharge Date and Time       Expected Discharge Date Expected Discharge Time    Mar 30, 2024               Breana Ruggiero RN

## 2024-03-28 NOTE — PROGRESS NOTES
Name: Josh Hinojosa ADMIT: 3/26/2024   : 1975  PCP: Provider, No Known    MRN: [id number] LOS: 1 days   AGE/SEX: 48 y.o. male  ROOM: [facility]/     Subjective   Subjective   Feels like he is breathing okay.  Slight cough minimally productive.       Objective   Objective   Vital Signs  Temp:  [98.1 °F (36.7 °C)-98.6 °F (37 °C)] 98.2 °F (36.8 °C)  Heart Rate:  [70-94] 79  Resp:  [18-23] 22  BP: (117-164)/(80-94) 117/80  SpO2:  [90 %-99 %] 92 %  on  Flow (L/min):  [40-60] 40;   Device (Oxygen Therapy): heated;humidified;high-flow nasal cannula  Body mass index is 52.51 kg/m².  Physical Exam  Vitals and nursing note reviewed.   Constitutional:       General: He is not in acute distress.  Cardiovascular:      Rate and Rhythm: Normal rate and regular rhythm.   Pulmonary:      Effort: Pulmonary effort is normal.      Breath sounds: Normal breath sounds.   Abdominal:      General: Bowel sounds are normal.      Palpations: Abdomen is soft.      Tenderness: There is no abdominal tenderness.   Musculoskeletal:         General: No swelling.   Skin:     General: Skin is warm and dry.   Neurological:      Mental Status: He is alert. Mental status is at baseline.       Results Review     I reviewed the patient's new clinical results.  Results from last 7 days   Lab Units 24  0411 24  0749 24  0651   WBC 10*3/mm3 9.63 11.69* 10.37   HEMOGLOBIN g/dL 15.1 15.7 16.1   PLATELETS 10*3/mm3 190 227 223     Results from last 7 days   Lab Units 24  0411 24  0735 24  0651   SODIUM mmol/L 141 139 138   POTASSIUM mmol/L 4.0 5.0 4.8   CHLORIDE mmol/L 99 97* 100   CO2 mmol/L 32.0* 34.0* 29.0   BUN mg/dL 20 21* 21*   CREATININE mg/dL 0.82 0.83 1.04   GLUCOSE mg/dL 93 132* 168*   EGFR mL/min/1.73 109.0 108.6 89.1     Results from last 7 days   Lab Units 24  0651   ALBUMIN g/dL 4.0   BILIRUBIN mg/dL 0.4   ALK PHOS U/L 58   AST (SGOT) U/L 21   ALT (SGPT) U/L 34     Results from last 7 days   Lab  "Units 03/28/24  0411 03/27/24  0735 03/26/24  0651   CALCIUM mg/dL 8.7 9.5 9.3   ALBUMIN g/dL  --   --  4.0     Results from last 7 days   Lab Units 03/26/24  0651   PROCALCITONIN ng/mL 0.06     No results found for: \"HGBA1C\", \"POCGLU\"    No radiology results for the last day    I have personally reviewed all medications:  Scheduled Medications  cefdinir, 300 mg, Oral, Q12H  furosemide, 40 mg, Oral, BID  ipratropium-albuterol, 3 mL, Nebulization, 4x Daily - RT  lisinopril, 10 mg, Oral, Q24H  nystatin, 500,000 Units, Swish & Swallow, 4x Daily  predniSONE, 40 mg, Oral, Daily With Breakfast  sodium chloride, 10 mL, Intravenous, Q12H    Infusions   Diet  Diet: Regular/House, Cardiac; Low Sodium (2g); Fluid Consistency: Thin (IDDSI 0)    I have personally reviewed:  [x]  Laboratory   [x]  Microbiology   [x]  Radiology   [x]  EKG/Telemetry  [x]  Cardiology/Vascular   []  Pathology    []  Records       Assessment/Plan     Active Hospital Problems    Diagnosis  POA    **Bacterial pneumonia [J15.9]  Yes    Morbid obesity with BMI of 50.0-59.9, adult [E66.01, Z68.43]  Not Applicable    Acute on chronic diastolic congestive heart failure [I50.33]  Yes    Acute respiratory failure with hypoxia [J96.01]  Yes    Obstructive sleep apnea [G47.33]  Yes    COPD (chronic obstructive pulmonary disease) [J44.9]  Yes    Hypertension [I10]  Yes    Chronic respiratory failure with hypercapnia [J96.12]  Yes    Pleuritic chest pain [R07.81]  Yes      Resolved Hospital Problems   No resolved problems to display.       48 y.o. male admitted with Bacterial pneumonia.    Appreciate assistance from pulmonology and cardiology.  IV diuresis now changed to oral Lasix.  Echocardiogram pending.  Patient reluctant regarding heart catheterization.  Prefers to wait and see what echocardiogram looks before discussing further with cardiology.  Respiratory status seems stable.       SCDs for DVT prophylaxis.  Full code.  Discussed with patient and nursing " staff.  Anticipate discharge home when cleared by consultants.      Rui Ramsey MD  [facility]  03/28/24  11:33 EDT

## 2024-03-28 NOTE — PLAN OF CARE
Goal Outcome Evaluation:            Pt had uneventful day. Pt given PO lasix. 40L high flow. Vitals stable and safety maintained.

## 2024-03-28 NOTE — PROGRESS NOTES
[facility]      Mar/chart reviewed  Follow up acute on chronic hypoxemic respiratory failure  Some cough with sputum    Vital Sign Min/Max for last 24 hours  Temp  Min: 98.1 °F (36.7 °C)  Max: 98.6 °F (37 °C)   BP  Min: 131/95  Max: 164/94   Pulse  Min: 70  Max: 94   Resp  Min: 18  Max: 23   SpO2  Min: 89 %  Max: 99 %   Flow (L/min)  Min: 60  Max: 60   Weight  Min: 161 kg (355 lb 9.6 oz)  Max: 161 kg (355 lb 9.6 oz)   480/3300      Appears ill, axox3,   perrl, eomi, normal sclera  mmm, no jvd, trachea midline, neck supple,  chest decreased ae bilaterally, + crackles, no wheezes,   rrr,   soft, nt, nd +bs,  no c/c/ 1+ edema  Skin warm, dry no rashes    Labs: 3/28: reviewed:  Glucose 93  Bun 20  Cr 0.82  Bicarb 32  Wbc 9.6  Hgb 15.1  Plts 190    A/P:  Acute hypoxemic respiratory failure - HIs degree of hypoxemia is out of proportion to PFT -- repeat echo with bubble to eval for shunt?   PNeumonia -- could just be atelectasis on ct -- will treat with antibiotics for now  Flank/chest pain -- suspect MSK or pleuritic. --will treat for pleurisy with small dose steorids.   Mixed obstructive and restrictive lung disease --preserved DLCO and actual lung volume on PFT with some suggestion of restriction on spirometry -- hyopxemia IS out of proportion to his PFT findings.   Chronic hypercapnic respiratory failure  Chronic diastolic chf -- last echo looked pretty good, consider RHC?  Morbid obesity  8.  SHARMILA    Improving I suspect mainly due to diuresis.  Will try and transition to regular nasal cannula.  His baseline respiratory status is out of proportion to both his PFT and echo, so still think RHC might be beneficial.

## 2024-03-29 ENCOUNTER — APPOINTMENT (OUTPATIENT)
Dept: CARDIOLOGY | Facility: HOSPITAL | Age: 49
DRG: 193 | End: 2024-03-29
Payer: COMMERCIAL

## 2024-03-29 LAB
ANION GAP SERPL CALCULATED.3IONS-SCNC: 11.4 MMOL/L (ref 5–15)
BUN SERPL-MCNC: 23 MG/DL (ref 6–20)
BUN/CREAT SERPL: 24.2 (ref 7–25)
CALCIUM SPEC-SCNC: 8.8 MG/DL (ref 8.6–10.5)
CHLORIDE SERPL-SCNC: 99 MMOL/L (ref 98–107)
CO2 SERPL-SCNC: 31.6 MMOL/L (ref 22–29)
CREAT SERPL-MCNC: 0.95 MG/DL (ref 0.76–1.27)
DEPRECATED RDW RBC AUTO: 45.1 FL (ref 37–54)
EGFRCR SERPLBLD CKD-EPI 2021: 99.3 ML/MIN/1.73
ERYTHROCYTE [DISTWIDTH] IN BLOOD BY AUTOMATED COUNT: 13.2 % (ref 12.3–15.4)
GLUCOSE SERPL-MCNC: 93 MG/DL (ref 65–99)
HCT VFR BLD AUTO: 45.4 % (ref 37.5–51)
HGB BLD-MCNC: 14.9 G/DL (ref 13–17.7)
MCH RBC QN AUTO: 30.4 PG (ref 26.6–33)
MCHC RBC AUTO-ENTMCNC: 32.8 G/DL (ref 31.5–35.7)
MCV RBC AUTO: 92.7 FL (ref 79–97)
PLATELET # BLD AUTO: 168 10*3/MM3 (ref 140–450)
PMV BLD AUTO: 9.9 FL (ref 6–12)
POTASSIUM SERPL-SCNC: 3.8 MMOL/L (ref 3.5–5.2)
RBC # BLD AUTO: 4.9 10*6/MM3 (ref 4.14–5.8)
SODIUM SERPL-SCNC: 142 MMOL/L (ref 136–145)
WBC NRBC COR # BLD AUTO: 7.95 10*3/MM3 (ref 3.4–10.8)

## 2024-03-29 PROCEDURE — 94799 UNLISTED PULMONARY SVC/PX: CPT

## 2024-03-29 PROCEDURE — 80048 BASIC METABOLIC PNL TOTAL CA: CPT | Performed by: HOSPITALIST

## 2024-03-29 PROCEDURE — 25510000001 PERFLUTREN (DEFINITY) 8.476 MG IN SODIUM CHLORIDE (PF) 0.9 % 10 ML INJECTION: Performed by: INTERNAL MEDICINE

## 2024-03-29 PROCEDURE — 94761 N-INVAS EAR/PLS OXIMETRY MLT: CPT

## 2024-03-29 PROCEDURE — 63710000001 PREDNISONE PER 1 MG: Performed by: INTERNAL MEDICINE

## 2024-03-29 PROCEDURE — 94664 DEMO&/EVAL PT USE INHALER: CPT

## 2024-03-29 PROCEDURE — 85027 COMPLETE CBC AUTOMATED: CPT | Performed by: HOSPITALIST

## 2024-03-29 PROCEDURE — 94760 N-INVAS EAR/PLS OXIMETRY 1: CPT

## 2024-03-29 PROCEDURE — 93306 TTE W/DOPPLER COMPLETE: CPT

## 2024-03-29 RX ADMIN — NYSTATIN 500000 UNITS: 100000 SUSPENSION ORAL at 17:37

## 2024-03-29 RX ADMIN — IPRATROPIUM BROMIDE AND ALBUTEROL SULFATE 3 ML: .5; 3 SOLUTION RESPIRATORY (INHALATION) at 11:00

## 2024-03-29 RX ADMIN — IPRATROPIUM BROMIDE AND ALBUTEROL SULFATE 3 ML: .5; 3 SOLUTION RESPIRATORY (INHALATION) at 19:54

## 2024-03-29 RX ADMIN — CEFDINIR 300 MG: 300 CAPSULE ORAL at 23:13

## 2024-03-29 RX ADMIN — NYSTATIN 500000 UNITS: 100000 SUSPENSION ORAL at 23:14

## 2024-03-29 RX ADMIN — IPRATROPIUM BROMIDE AND ALBUTEROL SULFATE 3 ML: .5; 3 SOLUTION RESPIRATORY (INHALATION) at 15:25

## 2024-03-29 RX ADMIN — PREDNISONE 40 MG: 20 TABLET ORAL at 09:11

## 2024-03-29 RX ADMIN — Medication 10 ML: at 09:13

## 2024-03-29 RX ADMIN — CEFDINIR 300 MG: 300 CAPSULE ORAL at 09:11

## 2024-03-29 RX ADMIN — NYSTATIN 500000 UNITS: 100000 SUSPENSION ORAL at 09:11

## 2024-03-29 RX ADMIN — Medication 10 ML: at 23:13

## 2024-03-29 RX ADMIN — PERFLUTREN 2 ML: 6.52 INJECTION, SUSPENSION INTRAVENOUS at 16:53

## 2024-03-29 RX ADMIN — FUROSEMIDE 40 MG: 40 TABLET ORAL at 09:11

## 2024-03-29 RX ADMIN — NYSTATIN 500000 UNITS: 100000 SUSPENSION ORAL at 13:08

## 2024-03-29 RX ADMIN — LISINOPRIL 10 MG: 10 TABLET ORAL at 09:11

## 2024-03-29 RX ADMIN — IPRATROPIUM BROMIDE AND ALBUTEROL SULFATE 3 ML: .5; 3 SOLUTION RESPIRATORY (INHALATION) at 07:07

## 2024-03-29 RX ADMIN — FUROSEMIDE 40 MG: 40 TABLET ORAL at 17:37

## 2024-03-29 NOTE — PLAN OF CARE
Goal Outcome Evaluation:      Pt on 4L NC. Echo done today. PO lasix given. Safety maintained and vitals stable.

## 2024-03-29 NOTE — PAYOR COMM NOTE
"Josh Hinojosa (48 y.o. Male)        PT REMAINS IN HOUSE AT THIS TIME.   PRIMARY DR HAS NOT ROUNDED AT THIS TIME.    HERE ARE THE CONSULTS FOR TODAY.     REF#[id number]    PLEASE CALL   OR  [phone]    THANK YOU    NAVI BLUNT LPN [facility]   Date of Birth   1975    Social Security Number       Address   [address] [place] [address]    Home Phone   [phone]    MRN   [id number]       Lutheran   Zoroastrianism    Marital Status   Single                            Admission Date   3/26/24    Admission Type   Emergency    Admitting Provider   Eduardo Luis MD    Attending Provider   Rui Ramsey MD    Department, Room/Bed   [address], [address]/1       Discharge Date       Discharge Disposition       Discharge Destination                                 Attending Provider: Rui Ramsey MD    Allergies: No Known Allergies    Isolation: None   Infection: None   Code Status: CPR    Ht: 175.3 cm (69\")   Wt: 161 kg (355 lb 9.6 oz)    Admission Cmt: None   Principal Problem: Bacterial pneumonia [J15.9]                   Active Insurance as of 3/26/2024       Primary Coverage       Payor Plan Insurance Group Employer/Plan Group    "[employer][note 3] BY DEJAN "[employer] BY DEJAN [id number]       Payor Plan Address Payor Plan Phone Number Payor Plan Fax Number Effective Dates    [address] [address]   1/1/2021 - None Entered    [facility] [id number]         Subscriber Name Subscriber Birth Date Member ID       JOSH HINOJOSA 1975 [id number]                     Emergency Contacts        (Rel.) Home Phone Work Phone Mobile Phone    elizabeth washington (Sister) -- -- [phone]              [place]: [facility] [id number]  Tax ID [id number]  Oxygen Therapy (last day)       Date/Time SpO2 Device (Oxygen Therapy) Flow (L/min) Oxygen Concentration (%) ETCO2 (mmHg)    03/29/24 1101 91 nasal cannula;humidified 6 -- --    03/29/24 0920 -- heated;high-flow nasal " cannula -- 40 --    03/29/24 0713 99 heated;high-flow nasal cannula;humidified 30 40 --    03/29/24 0708 94 heated;humidified;high-flow nasal cannula 40 45 --    03/29/24 0258 96 NPPV/NIV -- 60 --    03/29/24 0127 98 NPPV/NIV -- 60 --    03/29/24 0008 -- high-flow nasal cannula;heated;humidified -- -- --    03/29/24 0002 93 high-flow nasal cannula;heated;humidified -- -- --    03/29/24 0001 88 high-flow nasal cannula;heated;humidified -- -- --    03/29/24 0000 90 -- -- -- --    03/28/24 2256 94 high-flow nasal cannula;heated;humidified 40 45 --    03/28/24 2247 93 high-flow nasal cannula;heated;humidified 40 45 --    03/28/24 2001 93 high-flow nasal cannula;heated;humidified -- -- --    03/28/24 2000 88 high-flow nasal cannula;heated;humidified -- -- --    03/28/24 1959 95 -- -- -- --    03/28/24 1455 -- heated;high-flow nasal cannula 40 -- --    03/28/24 1437 96 -- 40 45 --    03/28/24 1433 90 heated;humidified;high-flow nasal cannula 40 60  --    03/28/24 1346 97 heated;humidified;nasal cannula -- -- --    03/28/24 1034 92 heated;humidified;high-flow nasal cannula 40 50 --    03/28/24 0830 -- heated;high-flow nasal cannula 60 50 --    03/28/24 0801 96 heated;humidified;nasal cannula -- -- --    03/28/24 0654 97 heated;humidified;high-flow nasal cannula 60 50 --    03/28/24 0651 94 heated;humidified;high-flow nasal cannula 60 60 --    03/28/24 0210 97 NPPV/NIV -- 60 --    03/28/24 0054 -- nasal prongs;high-flow nasal cannula;humidified 60 60 --          Intake & Output (last day)         03/28 0701 03/29 0700 03/29 0701 03/30 0700    P.O. 120 120    Total Intake(mL/kg) 120 (0.7) 120 (0.7)    Urine (mL/kg/hr) 2150 (0.6)     Stool 0     Total Output 2150     Net -2030 +120          Urine Unmeasured Occurrence 0 x     Stool Unmeasured Occurrence 2 x           Lines, Drains & Airways       Active LDAs       Name Placement date Placement time Site Days    Peripheral IV 03/26/24 0903 Right Antecubital 03/26/24 0903   Antecubital  3                       Physician Progress Notes (last 24 hours)        Ari Garcia MD at 24 0821              [facility]  [phone]  Dr. Ari Garcia    Subjective:  LOS: 2    Chief Complaint: Pneumonia    Sitting in a chair.  Feeling better.  Using hospital NIV.  Also has at home.  Also on oxygen at home at 2 L chronically.    Objective   Vital Signs past 24hrs  Temp range: Temp (24hrs), Av.5 °F (36.9 °C), Min:98.1 °F (36.7 °C), Max:99 °F (37.2 °C)    BP range: BP: (132-198)/(75-91) 132/75  Pulse range: Heart Rate:  [] 75  Resp rate range: Resp:  [17-22] 18  Device (Oxygen Therapy): heated;high-flow nasal cannula;humidifiedFlow (L/min):  [30-60] 30  Oxygen range:SpO2:  [88 %-99 %] 99 %   Mechanical Ventilator:     Physical Exam  Constitutional:       Appearance: He is obese.   Eyes:      Pupils: Pupils are equal, round, and reactive to light.   Cardiovascular:      Rate and Rhythm: Normal rate and regular rhythm.      Heart sounds: No murmur heard.  Pulmonary:      Effort: Pulmonary effort is normal.      Breath sounds: Examination of the right-lower field reveals rales. Examination of the left-lower field reveals rales. Decreased breath sounds and rales present.   Abdominal:      General: Bowel sounds are normal.      Palpations: Abdomen is soft. There is no mass.      Tenderness: There is no abdominal tenderness.   Musculoskeletal:         General: Swelling (trace) present.   Neurological:      Mental Status: He is alert.       Results Review:    I have reviewed the laboratory and imaging data since the last note by [facility] physician.  My annotations are noted in assessment and plan.      Result Review:  I have personally reviewed the results from last note by [facility] physician to 3/29/2024 08:21 EDT and agree with these findings:  [x]  Laboratory list / accordion  [x]  Microbiology  [x]  Radiology  []  EKG/Telemetry   []  Cardiology/Vascular   []  Pathology  []  Old  records  []  Other:    Medication Review:  I have reviewed the current MAR.  My annotations are noted in assessment and plan.    cefdinir, 300 mg, Oral, Q12H  furosemide, 40 mg, Oral, BID  ipratropium-albuterol, 3 mL, Nebulization, 4x Daily - RT  lisinopril, 10 mg, Oral, Q24H  nystatin, 500,000 Units, Swish & Swallow, 4x Daily  predniSONE, 40 mg, Oral, Daily With Breakfast  sodium chloride, 10 mL, Intravenous, Q12H           Lines, Drains & Airways       Active LDAs       Name Placement date Placement time Site Days    Peripheral IV 03/26/24 0903 Right Antecubital 03/26/24  0903  Antecubital  2                  Isolation status: No active isolations    Dietary Orders (From admission, onward)       Start     Ordered    03/28/24 1042  Diet: Regular/House, Cardiac; Low Sodium (2g); Fluid Consistency: Thin (IDDSI 0)  Diet Effective Now        References:    Diet Order [place]   Question Answer Comment   Diets: Regular/House    Diets: Cardiac    Cardiac Diet: Low Sodium (2g)    Fluid Consistency: Thin (IDDSI 0)        03/28/24 1041                    PCCM Problems  Acute hypoxic respiratory failure  Chronic hypoxic and hypercapnic respiratory failure  Bibasilar consolidation, pneumonia vs atelectasis  Chronic HFpEF  COPD exacerbation  Current cigarette smoker  Current marijuana smoker  Super obese  SHARMILA      THESE ARE NEW MEDICAL PROBLEMS TO ME.    Plan of Treatment    Currently on high flow cannula.  Only 40%.  Will ask RT to switch to regular nasal cannula and see if he tolerates.    Bibasilar consolidation, pneumonia versus atelectasis.  Complete out course of antibiotics and repeat CT required in 6 to 8 weeks.    Patient is on 2 L oxygen at home after last discharge.  Will try to wean him down to same.    Patient has chronic respiratory failure and uses a PAP device, at home at night.  He will continue same on discharge.    Patient is on prednisone, presumably for COPD exacerbation.  Can wean off.    He will be  counseled to quit smoking cigarettes and marijuana completely.      Ari Garcia MD  03/29/24  08:21 EDT      Part of this note may be an electronic transcription/translation of spoken language to printed text using the Dragon Dictation System.      Electronically signed by Ari Garcia MD at 03/29/24 1042       Puneet Xavier MD at 03/29/24 0815          Josh Hinojosa   48 y.o.  male    LOS: 2 days   Patient Care Team:  Provider, No Known as PCP - General      Subjective     Interval History:     Patient Complaints:     Review of Systems:       Medication Review:   Current Facility-Administered Medications:     acetaminophen (TYLENOL) tablet 650 mg, 650 mg, Oral, Q4H PRN **OR** acetaminophen (TYLENOL) 160 MG/5ML oral solution 650 mg, 650 mg, Oral, Q4H PRN **OR** acetaminophen (TYLENOL) suppository 650 mg, 650 mg, Rectal, Q4H PRN, Eduardo Luis MD    sennosides-docusate (PERICOLACE) 8.6-50 MG per tablet 2 tablet, 2 tablet, Oral, BID PRN **AND** polyethylene glycol (MIRALAX) packet 17 g, 17 g, Oral, Daily PRN **AND** bisacodyl (DULCOLAX) EC tablet 5 mg, 5 mg, Oral, Daily PRN **AND** bisacodyl (DULCOLAX) suppository 10 mg, 10 mg, Rectal, Daily PRN, Eduardo Luis MD    cefdinir (OMNICEF) capsule 300 mg, 300 mg, Oral, Q12H, Dougie Lang MD, 300 mg at 03/28/24 2214    furosemide (LASIX) tablet 40 mg, 40 mg, Oral, BID, Puneet Xavier MD, 40 mg at 03/28/24 1746    HYDROcodone-acetaminophen (NORCO) 5-325 MG per tablet 1 tablet, 1 tablet, Oral, Q6H PRN, Eduardo Luis MD, 1 tablet at 03/26/24 2335    ipratropium-albuterol (DUO-NEB) nebulizer solution 3 mL, 3 mL, Nebulization, 4x Daily - RT, Dougie Lang MD, 3 mL at 03/29/24 0707    ipratropium-albuterol (DUO-NEB) nebulizer solution 3 mL, 3 mL, Nebulization, Q4H PRN, Eduardo Luis MD    lisinopril (PRINIVIL,ZESTRIL) tablet 10 mg, 10 mg, Oral, Q24H, Eduardo Luis MD, 10 mg at 03/28/24 0818    morphine injection 4 mg, 4 mg, Intravenous, Q4H PRN, Beard,  Eduardo LAGOS MD, 4 mg at 03/26/24 1609    nystatin (MYCOSTATIN) 100,000 unit/mL suspension 500,000 Units, 500,000 Units, Swish & Swallow, 4x Daily, Eduardo Luis MD, 500,000 Units at 03/28/24 2213    ondansetron ODT (ZOFRAN-ODT) disintegrating tablet 4 mg, 4 mg, Oral, Q6H PRN **OR** ondansetron (ZOFRAN) injection 4 mg, 4 mg, Intravenous, Q6H PRN, Eduardo Luis MD    predniSONE (DELTASONE) tablet 40 mg, 40 mg, Oral, Daily With Breakfast, Dougie Lang MD, 40 mg at 03/28/24 0818    sodium chloride 0.9 % flush 10 mL, 10 mL, Intravenous, PRN, Robbie Daniel MD    sodium chloride 0.9 % flush 10 mL, 10 mL, Intravenous, Q12H, Eduardo Luis MD, 10 mL at 03/28/24 2214    sodium chloride 0.9 % flush 10 mL, 10 mL, Intravenous, PRN, Eduardo Luis MD    sodium chloride 0.9 % infusion 40 mL, 40 mL, Intravenous, PRN, Eduardo Luis MD      Objective   Vital Sign Min/Max for last 24 hours  Temp  Min: 98.1 °F (36.7 °C)  Max: 99 °F (37.2 °C)   BP  Min: 132/75  Max: 198/86    Pulse  Min: 75  Max: 103     Wt Readings from Last 3 Encounters:   03/28/24 (!) 161 kg (355 lb 9.6 oz)   03/13/24 (!) 159 kg (349 lb 12.8 oz)        Intake/Output Summary (Last 24 hours) at 3/29/2024 0816  Last data filed at 3/28/2024 2157  Gross per 24 hour   Intake 120 ml   Output 1750 ml   Net -1630 ml     Physical Exam:      General Appearance:    Well developed and well nourished in no acute distress   Head:    Normocephalic, atraumatic   Eyes:            Conjunctivae normal, anicteric, no xanthelasma   Neck:   supple, trachea midline, no thyromegaly, no carotid bruit, no JVD, no elevated CVP   Lungs:     Clear to auscultation,respirations regular, even and                unlabored    Heart:    Regular rhythm and normal rate, normal S1 and S2,            No murmur, no gallop, no rub, no click   Chest Wall:    No abnormalities observed   Abdomen:     Normal bowel sounds, soft, nondistended           Rectal:     Deferred   Extremities:   No edema. Moves  "all extremities well, no cyanosis, no erythema   Pulses:   Pulses palpable and equal bilaterally   Skin:   No bleeding, bruising or rash   Neurologic:   awake alert and oriented x3, speech clear and approp, no facial drooping     :    Monitor:      Results Review:         Sodium Sodium   Date Value Ref Range Status   03/29/2024 142 136 - 145 mmol/L Final   03/28/2024 141 136 - 145 mmol/L Final   03/27/2024 139 136 - 145 mmol/L Final      Potassium Potassium   Date Value Ref Range Status   03/29/2024 3.8 3.5 - 5.2 mmol/L Final   03/28/2024 4.0 3.5 - 5.2 mmol/L Final     Comment:     Slight hemolysis detected by analyzer. Result may be falsely elevated.   03/27/2024 5.0 3.5 - 5.2 mmol/L Final     Comment:     Slight hemolysis detected by analyzer. Result may be falsely elevated.      Chloride Chloride   Date Value Ref Range Status   03/29/2024 99 98 - 107 mmol/L Final   03/28/2024 99 98 - 107 mmol/L Final   03/27/2024 97 (L) 98 - 107 mmol/L Final      Bicarbonate No results found for: \"PLASMABICARB\"   BUN BUN   Date Value Ref Range Status   03/29/2024 23 (H) 6 - 20 mg/dL Final   03/28/2024 20 6 - 20 mg/dL Final   03/27/2024 21 (H) 6 - 20 mg/dL Final      Creatinine Creatinine   Date Value Ref Range Status   03/29/2024 0.95 0.76 - 1.27 mg/dL Final   03/28/2024 0.82 0.76 - 1.27 mg/dL Final   03/27/2024 0.83 0.76 - 1.27 mg/dL Final      Calcium Calcium   Date Value Ref Range Status   03/29/2024 8.8 8.6 - 10.5 mg/dL Final   03/28/2024 8.7 8.6 - 10.5 mg/dL Final   03/27/2024 9.5 8.6 - 10.5 mg/dL Final      Magnesium No results found for: \"MG\"     Results from last 7 days   Lab Units 03/29/24  0437   WBC 10*3/mm3 7.95   HEMOGLOBIN g/dL 14.9   HEMATOCRIT % 45.4   PLATELETS 10*3/mm3 168     Lab Results   Lab Value Date/Time    TROPONINT 16 03/26/2024 0651    TROPONINT 44 (H) 03/07/2024 1742    TROPONINT 38 (H) 03/07/2024 1410            Echo EF Estimated  No results found for: \"ECHOEFEST\"      Assessment/ Plan  Assessment & " Plan   Active Hospital Problems    Diagnosis  POA    **Bacterial pneumonia [J15.9]  Yes     Priority: Low    Morbid obesity with BMI of 50.0-59.9, adult [E66.01, Z68.43]  Not Applicable     Priority: Low    Acute on chronic diastolic congestive heart failure [I50.33]  Yes     Priority: Low    Acute respiratory failure with hypoxia [J96.01]  Yes     Priority: Low    Obstructive sleep apnea [G47.33]  Yes     Priority: Low    COPD (chronic obstructive pulmonary disease) [J44.9]  Yes     Priority: Low    Hypertension [I10]  Yes     Priority: Low    Chronic respiratory failure with hypercapnia [J96.12]  Yes     Priority: Low    Pleuritic chest pain [R07.81]  Yes     Priority: Low       Acute hypoxic respiratory failure with chronic hypercapnic failure  PNA   Pulm foll on abx  Also on steroids for pleuritic pain  COPD  Chronic diastolic CHF    A. Echo  3/7/24: Left ventricular systolic function is hyperdynamic (EF > 70%). Calculated left ventricular EF = 78.8% Normal left ventricular cavity size noted. Left ventricular wall thickness is consistent with severe concentric hypertrophy. All left ventricular wall segments contract normally. Left ventricular diastolic function was normal.   The right ventricular cavity is mildly dilated. Normal right ventricular systolic function noted.  The left atrial cavity is moderately dilated. The right atrial cavity is moderately dilated.   Trace tricuspid valve regurgitation is present. Insufficient TR velocity profile to estimate the right ventricular systolic pressure.   Borderline dilation of the ascending aorta is present. Aortic arch = 3.7 cm   5. SHARMILA/CPAP     Plan  Repeat echo 3/28/2024 pending  Considering RHC- pt wants to wait for echo results  Md to follow with interview and exam/plan      Rhoda Colindres, DORIS  03/29/24  08:16 EDT    Patient interviewed and examined  I reviewed and agree with the Rhoda Colindres's notes and assessment and accept the inherent risks  Await  echocardiogram with bubble study          Electronically signed by Puneet Xavier MD at 03/29/24 1010       No notes of this type exist for this encounter.

## 2024-03-29 NOTE — PLAN OF CARE
Goal Outcome Evaluation:              Outcome Evaluation: CONTINUES TO REQUIRE OPTIFLOW DURING THE DAY AND BIPAP WHILE SLEEPING OVERNIGHT  TO KEEP SATS AT LEAST 90%.

## 2024-03-29 NOTE — PROGRESS NOTES
[facility]   48 y.o.  male    LOS: 2 days   Patient Care Team:  Provider, No Known as PCP - General      Subjective     Interval History:     Patient Complaints:     Review of Systems:       Medication Review:   Current Facility-Administered Medications:     acetaminophen (TYLENOL) tablet 650 mg, 650 mg, Oral, Q4H PRN **OR** acetaminophen (TYLENOL) 160 MG/5ML oral solution 650 mg, 650 mg, Oral, Q4H PRN **OR** acetaminophen (TYLENOL) suppository 650 mg, 650 mg, Rectal, Q4H PRN, Eduardo Luis MD    sennosides-docusate (PERICOLACE) 8.6-50 MG per tablet 2 tablet, 2 tablet, Oral, BID PRN **AND** polyethylene glycol (MIRALAX) packet 17 g, 17 g, Oral, Daily PRN **AND** bisacodyl (DULCOLAX) EC tablet 5 mg, 5 mg, Oral, Daily PRN **AND** bisacodyl (DULCOLAX) suppository 10 mg, 10 mg, Rectal, Daily PRN, Eduardo Luis MD    cefdinir (OMNICEF) capsule 300 mg, 300 mg, Oral, Q12H, Dougie Lang MD, 300 mg at 03/28/24 2214    furosemide (LASIX) tablet 40 mg, 40 mg, Oral, BID, Puneet Xavier MD, 40 mg at 03/28/24 1746    HYDROcodone-acetaminophen (NORCO) 5-325 MG per tablet 1 tablet, 1 tablet, Oral, Q6H PRN, Eduardo Luis MD, 1 tablet at 03/26/24 2335    ipratropium-albuterol (DUO-NEB) nebulizer solution 3 mL, 3 mL, Nebulization, 4x Daily - RT, Dougie Lang MD, 3 mL at 03/29/24 0707    ipratropium-albuterol (DUO-NEB) nebulizer solution 3 mL, 3 mL, Nebulization, Q4H PRN, Eduardo Luis MD    lisinopril (PRINIVIL,ZESTRIL) tablet 10 mg, 10 mg, Oral, Q24H, Eduardo Luis MD, 10 mg at 03/28/24 0818    morphine injection 4 mg, 4 mg, Intravenous, Q4H PRN, Eduardo Luis MD, 4 mg at 03/26/24 1609    nystatin (MYCOSTATIN) 100,000 unit/mL suspension 500,000 Units, 500,000 Units, Swish & Swallow, 4x Daily, Eduardo Luis MD, 500,000 Units at 03/28/24 2152    ondansetron ODT (ZOFRAN-ODT) disintegrating tablet 4 mg, 4 mg, Oral, Q6H PRN **OR** ondansetron (ZOFRAN) injection 4 mg, 4 mg, Intravenous, Q6H PRN, Eduardo Luis MD     predniSONE (DELTASONE) tablet 40 mg, 40 mg, Oral, Daily With Breakfast, Dougie Lang MD, 40 mg at 03/28/24 0818    sodium chloride 0.9 % flush 10 mL, 10 mL, Intravenous, PRN, Robbie Daniel MD    sodium chloride 0.9 % flush 10 mL, 10 mL, Intravenous, Q12H, Eduardo Luis MD, 10 mL at 03/28/24 2214    sodium chloride 0.9 % flush 10 mL, 10 mL, Intravenous, PRN, Eduardo Luis MD    sodium chloride 0.9 % infusion 40 mL, 40 mL, Intravenous, PRN, Eduardo Luis MD      Objective   Vital Sign Min/Max for last 24 hours  Temp  Min: 98.1 °F (36.7 °C)  Max: 99 °F (37.2 °C)   BP  Min: 132/75  Max: 198/86    Pulse  Min: 75  Max: 103     Wt Readings from Last 3 Encounters:   03/28/24 (!) 161 kg (355 lb 9.6 oz)   03/13/24 (!) 159 kg (349 lb 12.8 oz)        Intake/Output Summary (Last 24 hours) at 3/29/2024 0816  Last data filed at 3/28/2024 2157  Gross per 24 hour   Intake 120 ml   Output 1750 ml   Net -1630 ml     Physical Exam:      General Appearance:    Well developed and well nourished in no acute distress   Head:    Normocephalic, atraumatic   Eyes:            Conjunctivae normal, anicteric, no xanthelasma   Neck:   supple, trachea midline, no thyromegaly, no carotid bruit, no JVD, no elevated CVP   Lungs:     Clear to auscultation,respirations regular, even and                unlabored    Heart:    Regular rhythm and normal rate, normal S1 and S2,            No murmur, no gallop, no rub, no click   Chest Wall:    No abnormalities observed   Abdomen:     Normal bowel sounds, soft, nondistended           Rectal:     Deferred   Extremities:   No edema. Moves all extremities well, no cyanosis, no erythema   Pulses:   Pulses palpable and equal bilaterally   Skin:   No bleeding, bruising or rash   Neurologic:   awake alert and oriented x3, speech clear and approp, no facial drooping     :    Monitor:      Results Review:         Sodium Sodium   Date Value Ref Range Status   03/29/2024 142 136 - 145 mmol/L Final  "  03/28/2024 141 136 - 145 mmol/L Final   03/27/2024 139 136 - 145 mmol/L Final      Potassium Potassium   Date Value Ref Range Status   03/29/2024 3.8 3.5 - 5.2 mmol/L Final   03/28/2024 4.0 3.5 - 5.2 mmol/L Final     Comment:     Slight hemolysis detected by analyzer. Result may be falsely elevated.   03/27/2024 5.0 3.5 - 5.2 mmol/L Final     Comment:     Slight hemolysis detected by analyzer. Result may be falsely elevated.      Chloride Chloride   Date Value Ref Range Status   03/29/2024 99 98 - 107 mmol/L Final   03/28/2024 99 98 - 107 mmol/L Final   03/27/2024 97 (L) 98 - 107 mmol/L Final      Bicarbonate No results found for: \"PLASMABICARB\"   BUN BUN   Date Value Ref Range Status   03/29/2024 23 (H) 6 - 20 mg/dL Final   03/28/2024 20 6 - 20 mg/dL Final   03/27/2024 21 (H) 6 - 20 mg/dL Final      Creatinine Creatinine   Date Value Ref Range Status   03/29/2024 0.95 0.76 - 1.27 mg/dL Final   03/28/2024 0.82 0.76 - 1.27 mg/dL Final   03/27/2024 0.83 0.76 - 1.27 mg/dL Final      Calcium Calcium   Date Value Ref Range Status   03/29/2024 8.8 8.6 - 10.5 mg/dL Final   03/28/2024 8.7 8.6 - 10.5 mg/dL Final   03/27/2024 9.5 8.6 - 10.5 mg/dL Final      Magnesium No results found for: \"MG\"     Results from last 7 days   Lab Units 03/29/24  0437   WBC 10*3/mm3 7.95   HEMOGLOBIN g/dL 14.9   HEMATOCRIT % 45.4   PLATELETS 10*3/mm3 168     Lab Results   Lab Value Date/Time    TROPONINT 16 03/26/2024 0651    TROPONINT 44 (H) 03/07/2024 1742    TROPONINT 38 (H) 03/07/2024 1410            Echo EF Estimated  No results found for: \"ECHOEFEST\"      Assessment/ Plan  Assessment & Plan   Active Hospital Problems    Diagnosis  POA    **Bacterial pneumonia [J15.9]  Yes     Priority: Low    Morbid obesity with BMI of 50.0-59.9, adult [E66.01, Z68.43]  Not Applicable     Priority: Low    Acute on chronic diastolic congestive heart failure [I50.33]  Yes     Priority: Low    Acute respiratory failure with hypoxia [J96.01]  Yes     " Priority: Low    Obstructive sleep apnea [G47.33]  Yes     Priority: Low    COPD (chronic obstructive pulmonary disease) [J44.9]  Yes     Priority: Low    Hypertension [I10]  Yes     Priority: Low    Chronic respiratory failure with hypercapnia [J96.12]  Yes     Priority: Low    Pleuritic chest pain [R07.81]  Yes     Priority: Low       Acute hypoxic respiratory failure with chronic hypercapnic failure  PNA   Pulm foll on abx  Also on steroids for pleuritic pain  COPD  Chronic diastolic CHF    A. Echo  3/7/24: Left ventricular systolic function is hyperdynamic (EF > 70%). Calculated left ventricular EF = 78.8% Normal left ventricular cavity size noted. Left ventricular wall thickness is consistent with severe concentric hypertrophy. All left ventricular wall segments contract normally. Left ventricular diastolic function was normal.   The right ventricular cavity is mildly dilated. Normal right ventricular systolic function noted.  The left atrial cavity is moderately dilated. The right atrial cavity is moderately dilated.   Trace tricuspid valve regurgitation is present. Insufficient TR velocity profile to estimate the right ventricular systolic pressure.   Borderline dilation of the ascending aorta is present. Aortic arch = 3.7 cm   5. SHARMILA/CPAP     Plan  Repeat echo 3/28/2024 pending  Considering RHC- pt wants to wait for echo results  Md to follow with interview and exam/plan      Rhoda Colindres, DORIS  03/29/24  08:16 EDT    Patient interviewed and examined  I reviewed and agree with the Rhoda Colindres's notes and assessment and accept the inherent risks  Await echocardiogram with bubble study

## 2024-03-29 NOTE — CASE MANAGEMENT/SOCIAL WORK
Continued Stay Note  [facility]     Patient Name: Josh Hinojosa  MRN: [id number]  Today's Date: 3/29/2024    Admit Date: 3/26/2024    Plan: Home, family to transport   Discharge Plan       Row Name 03/29/24 1604       Plan    Plan Home, family to transport    Plan Comments Met briefly with pt at bedside. Confirmed no dc needs should he be dc over the weekend. Family to transport home.                   Discharge Codes    No documentation.                 Expected Discharge Date and Time       Expected Discharge Date Expected Discharge Time    Apr 1, 2024               Breana Ruggiero RN

## 2024-03-29 NOTE — PROGRESS NOTES
[facility]  [phone]  Dr. Ari Garcia    Subjective:  LOS: 2    Chief Complaint: Pneumonia    Sitting in a chair.  Feeling better.  Using hospital NIV.  Also has at home.  Also on oxygen at home at 2 L chronically.    Objective   Vital Signs past 24hrs  Temp range: Temp (24hrs), Av.5 °F (36.9 °C), Min:98.1 °F (36.7 °C), Max:99 °F (37.2 °C)    BP range: BP: (132-198)/(75-91) 132/75  Pulse range: Heart Rate:  [] 75  Resp rate range: Resp:  [17-22] 18  Device (Oxygen Therapy): heated;high-flow nasal cannula;humidifiedFlow (L/min):  [30-60] 30  Oxygen range:SpO2:  [88 %-99 %] 99 %   Mechanical Ventilator:     Physical Exam  Constitutional:       Appearance: He is obese.   Eyes:      Pupils: Pupils are equal, round, and reactive to light.   Cardiovascular:      Rate and Rhythm: Normal rate and regular rhythm.      Heart sounds: No murmur heard.  Pulmonary:      Effort: Pulmonary effort is normal.      Breath sounds: Examination of the right-lower field reveals rales. Examination of the left-lower field reveals rales. Decreased breath sounds and rales present.   Abdominal:      General: Bowel sounds are normal.      Palpations: Abdomen is soft. There is no mass.      Tenderness: There is no abdominal tenderness.   Musculoskeletal:         General: Swelling (trace) present.   Neurological:      Mental Status: He is alert.       Results Review:    I have reviewed the laboratory and imaging data since the last note by [facility] physician.  My annotations are noted in assessment and plan.      Result Review:  I have personally reviewed the results from last note by [facility] physician to 3/29/2024 08:21 EDT and agree with these findings:  [x]  Laboratory list / accordion  [x]  Microbiology  [x]  Radiology  []  EKG/Telemetry   []  Cardiology/Vascular   []  Pathology  []  Old records  []  Other:    Medication Review:  I have reviewed the current MAR.  My annotations are noted in assessment and  plan.    cefdinir, 300 mg, Oral, Q12H  furosemide, 40 mg, Oral, BID  ipratropium-albuterol, 3 mL, Nebulization, 4x Daily - RT  lisinopril, 10 mg, Oral, Q24H  nystatin, 500,000 Units, Swish & Swallow, 4x Daily  predniSONE, 40 mg, Oral, Daily With Breakfast  sodium chloride, 10 mL, Intravenous, Q12H           Lines, Drains & Airways       Active LDAs       Name Placement date Placement time Site Days    Peripheral IV 03/26/24 0903 Right Antecubital 03/26/24  0903  Antecubital  2                  Isolation status: No active isolations    Dietary Orders (From admission, onward)       Start     Ordered    03/28/24 1042  Diet: Regular/House, Cardiac; Low Sodium (2g); Fluid Consistency: Thin (IDDSI 0)  Diet Effective Now        References:    Diet Order [place]   Question Answer Comment   Diets: Regular/House    Diets: Cardiac    Cardiac Diet: Low Sodium (2g)    Fluid Consistency: Thin (IDDSI 0)        03/28/24 1041                    PCCM Problems  Acute hypoxic respiratory failure  Chronic hypoxic and hypercapnic respiratory failure  Bibasilar consolidation, pneumonia vs atelectasis  Chronic HFpEF  COPD exacerbation  Current cigarette smoker  Current marijuana smoker  Super obese  SHARMILA      THESE ARE NEW MEDICAL PROBLEMS TO ME.    Plan of Treatment    Currently on high flow cannula.  Only 40%.  Will ask RT to switch to regular nasal cannula and see if he tolerates.    Bibasilar consolidation, pneumonia versus atelectasis.  Complete out course of antibiotics and repeat CT required in 6 to 8 weeks.    Patient is on 2 L oxygen at home after last discharge.  Will try to wean him down to same.    Patient has chronic respiratory failure and uses a PAP device, at home at night.  He will continue same on discharge.    Patient is on prednisone, presumably for COPD exacerbation.  Can wean off.    He will be counseled to quit smoking cigarettes and marijuana completely.      Ari Garcia MD  03/29/24  08:21 EDT      Part of this  note may be an electronic transcription/translation of spoken language to printed text using the Dragon Dictation System.

## 2024-03-29 NOTE — PROGRESS NOTES
Name: Josh Hinojosa ADMIT: 3/26/2024   : 1975  PCP: Provider, No Known    MRN: [id number] LOS: 2 days   AGE/SEX: 48 y.o. male  ROOM: [facility]/1     Subjective   Subjective   Now on regular nasal cannula and feels like he is breathing comfortably.  Denies any other problems or complaints.       Objective   Objective   Vital Signs  Temp:  [98.1 °F (36.7 °C)-99 °F (37.2 °C)] 98.1 °F (36.7 °C)  Heart Rate:  [] 84  Resp:  [17-22] 20  BP: (132-198)/(75-91) 132/75  SpO2:  [88 %-99 %] 91 %  on  Flow (L/min):  [6-40] 6;   Device (Oxygen Therapy): nasal cannula;humidified  Body mass index is 52.51 kg/m².  Physical Exam  Vitals and nursing note reviewed.   Constitutional:       General: He is not in acute distress.     Appearance: He is obese. He is ill-appearing.   Cardiovascular:      Rate and Rhythm: Normal rate and regular rhythm.   Pulmonary:      Effort: Pulmonary effort is normal.      Breath sounds: Normal breath sounds.   Abdominal:      General: Bowel sounds are normal.      Palpations: Abdomen is soft.      Tenderness: There is no abdominal tenderness.   Musculoskeletal:         General: No swelling.   Skin:     General: Skin is warm and dry.   Neurological:      Mental Status: He is alert. Mental status is at baseline.       Results Review     I reviewed the patient's new clinical results.  Results from last 7 days   Lab Units 24  0749 24  0651   WBC 10*3/mm3 7.95 9.63 11.69* 10.37   HEMOGLOBIN g/dL 14.9 15.1 15.7 16.1   PLATELETS 10*3/mm3 168 190 227 223     Results from last 7 days   Lab Units 24  0735 24  0651   SODIUM mmol/L 142 141 139 138   POTASSIUM mmol/L 3.8 4.0 5.0 4.8   CHLORIDE mmol/L 99 99 97* 100   CO2 mmol/L 31.6* 32.0* 34.0* 29.0   BUN mg/dL 23* 20 21* 21*   CREATININE mg/dL 0.95 0.82 0.83 1.04   GLUCOSE mg/dL 93 93 132* 168*   EGFR mL/min/1.73 99.3 109.0 108.6 89.1     Results from last 7 days   Lab  "Units 03/26/24  0651   ALBUMIN g/dL 4.0   BILIRUBIN mg/dL 0.4   ALK PHOS U/L 58   AST (SGOT) U/L 21   ALT (SGPT) U/L 34     Results from last 7 days   Lab Units 03/29/24  0437 03/28/24  0411 03/27/24  0735 03/26/24  0651   CALCIUM mg/dL 8.8 8.7 9.5 9.3   ALBUMIN g/dL  --   --   --  4.0     Results from last 7 days   Lab Units 03/26/24  0651   PROCALCITONIN ng/mL 0.06     No results found for: \"HGBA1C\", \"POCGLU\"    No radiology results for the last day    I have personally reviewed all medications:  Scheduled Medications  cefdinir, 300 mg, Oral, Q12H  furosemide, 40 mg, Oral, BID  ipratropium-albuterol, 3 mL, Nebulization, 4x Daily - RT  lisinopril, 10 mg, Oral, Q24H  nystatin, 500,000 Units, Swish & Swallow, 4x Daily  predniSONE, 40 mg, Oral, Daily With Breakfast  sodium chloride, 10 mL, Intravenous, Q12H    Infusions   Diet  Diet: Regular/House, Cardiac; Low Sodium (2g); Fluid Consistency: Thin (IDDSI 0)    I have personally reviewed:  [x]  Laboratory   [x]  Microbiology   [x]  Radiology   [x]  EKG/Telemetry  [x]  Cardiology/Vascular   []  Pathology    []  Records       Assessment/Plan     Active Hospital Problems    Diagnosis  POA    **Bacterial pneumonia [J15.9]  Yes    Morbid obesity with BMI of 50.0-59.9, adult [E66.01, Z68.43]  Not Applicable    Acute on chronic diastolic congestive heart failure [I50.33]  Yes    Acute respiratory failure with hypoxia [J96.01]  Yes    Obstructive sleep apnea [G47.33]  Yes    COPD (chronic obstructive pulmonary disease) [J44.9]  Yes    Hypertension [I10]  Yes    Chronic respiratory failure with hypercapnia [J96.12]  Yes    Pleuritic chest pain [R07.81]  Yes      Resolved Hospital Problems   No resolved problems to display.       48 y.o. male admitted with Bacterial pneumonia.    Currently on nasal cannula at 5 to 6 L and patient breathing comfortably.  He has oxygen at home.  Appreciate pulmonology assistance.  Tapering steroids.  Plan possible discharge over the weekend when " cleared by pulmonology.     Echo pending/not yet done      SCDs for DVT prophylaxis.  Full code.  Discussed with patient.  Anticipate discharge home when cleared by consultants.      Rui Ramsey MD  [facility]  03/29/24  12:43 EDT

## 2024-03-30 LAB
AORTIC DIMENSIONLESS INDEX: 0.8 (DI)
ASCENDING AORTA: 3.1 CM
BH CV ECHO MEAS - ACS: 2.9 CM
BH CV ECHO MEAS - AO MAX PG: 15.7 MMHG
BH CV ECHO MEAS - AO MEAN PG: 8.7 MMHG
BH CV ECHO MEAS - AO ROOT DIAM: 3.7 CM
BH CV ECHO MEAS - AO V2 MAX: 198.2 CM/SEC
BH CV ECHO MEAS - AO V2 VTI: 30.9 CM
BH CV ECHO MEAS - AVA(I,D): 2.9 CM2
BH CV ECHO MEAS - EDV(CUBED): 205.9 ML
BH CV ECHO MEAS - EDV(MOD-SP2): 175 ML
BH CV ECHO MEAS - EDV(MOD-SP4): 130 ML
BH CV ECHO MEAS - EF(MOD-BP): 62.5 %
BH CV ECHO MEAS - EF(MOD-SP2): 61.1 %
BH CV ECHO MEAS - EF(MOD-SP4): 63.1 %
BH CV ECHO MEAS - ESV(CUBED): 59.7 ML
BH CV ECHO MEAS - ESV(MOD-SP2): 68 ML
BH CV ECHO MEAS - ESV(MOD-SP4): 48 ML
BH CV ECHO MEAS - FS: 33.8 %
BH CV ECHO MEAS - IVS/LVPW: 0.9 CM
BH CV ECHO MEAS - IVSD: 1.48 CM
BH CV ECHO MEAS - LA DIMENSION: 3.7 CM
BH CV ECHO MEAS - LAT PEAK E' VEL: 10.3 CM/SEC
BH CV ECHO MEAS - LV DIASTOLIC VOL/BSA (35-75): 49.3 CM2
BH CV ECHO MEAS - LV MASS(C)D: 441 GRAMS
BH CV ECHO MEAS - LV MAX PG: 7.5 MMHG
BH CV ECHO MEAS - LV MEAN PG: 3.7 MMHG
BH CV ECHO MEAS - LV SYSTOLIC VOL/BSA (12-30): 18.2 CM2
BH CV ECHO MEAS - LV V1 MAX: 137.4 CM/SEC
BH CV ECHO MEAS - LV V1 VTI: 24.2 CM
BH CV ECHO MEAS - LVIDD: 5.9 CM
BH CV ECHO MEAS - LVIDS: 3.9 CM
BH CV ECHO MEAS - LVOT AREA: 3.7 CM2
BH CV ECHO MEAS - LVOT DIAM: 2.17 CM
BH CV ECHO MEAS - LVPWD: 1.65 CM
BH CV ECHO MEAS - MED PEAK E' VEL: 5.9 CM/SEC
BH CV ECHO MEAS - MV A DUR: 0.13 SEC
BH CV ECHO MEAS - MV A MAX VEL: 104.8 CM/SEC
BH CV ECHO MEAS - MV DEC SLOPE: 445.3 CM/SEC2
BH CV ECHO MEAS - MV DEC TIME: 212 SEC
BH CV ECHO MEAS - MV E MAX VEL: 93.3 CM/SEC
BH CV ECHO MEAS - MV E/A: 0.89
BH CV ECHO MEAS - MV MAX PG: 5.6 MMHG
BH CV ECHO MEAS - MV MEAN PG: 2.9 MMHG
BH CV ECHO MEAS - MV P1/2T: 77.8 MSEC
BH CV ECHO MEAS - MV V2 VTI: 29.6 CM
BH CV ECHO MEAS - MVA(P1/2T): 2.8 CM2
BH CV ECHO MEAS - MVA(VTI): 3 CM2
BH CV ECHO MEAS - PA ACC TIME: 0.11 SEC
BH CV ECHO MEAS - PA V2 MAX: 119.8 CM/SEC
BH CV ECHO MEAS - RAP SYSTOLE: 3 MMHG
BH CV ECHO MEAS - RV MAX PG: 2.47 MMHG
BH CV ECHO MEAS - RV V1 MAX: 78.6 CM/SEC
BH CV ECHO MEAS - RV V1 VTI: 13.4 CM
BH CV ECHO MEAS - RVSP: 17.2 MMHG
BH CV ECHO MEAS - SI(MOD-SP2): 40.6 ML/M2
BH CV ECHO MEAS - SI(MOD-SP4): 31.1 ML/M2
BH CV ECHO MEAS - SV(LVOT): 89.8 ML
BH CV ECHO MEAS - SV(MOD-SP2): 107 ML
BH CV ECHO MEAS - SV(MOD-SP4): 82 ML
BH CV ECHO MEAS - TAPSE (>1.6): 2.9 CM
BH CV ECHO MEAS - TR MAX PG: 14.2 MMHG
BH CV ECHO MEAS - TR MAX VEL: 188.2 CM/SEC
BH CV ECHO MEASUREMENTS AVERAGE E/E' RATIO: 11.52
BH CV ECHO SHUNT ASSESSMENT PERFORMED (HIDDEN SCRIPTING): 1
BH CV XLRA - RV BASE: 4.5 CM
BH CV XLRA - RV LENGTH: 9.5 CM
BH CV XLRA - RV MID: 3.6 CM
BH CV XLRA - TDI S': 22.5 CM/SEC
LEFT ATRIUM VOLUME INDEX: 23 ML/M2
SINUS: 3.3 CM
STJ: 3.1 CM

## 2024-03-30 PROCEDURE — 94799 UNLISTED PULMONARY SVC/PX: CPT

## 2024-03-30 PROCEDURE — 94660 CPAP INITIATION&MGMT: CPT

## 2024-03-30 PROCEDURE — 94760 N-INVAS EAR/PLS OXIMETRY 1: CPT

## 2024-03-30 PROCEDURE — 94761 N-INVAS EAR/PLS OXIMETRY MLT: CPT

## 2024-03-30 PROCEDURE — 63710000001 PREDNISONE PER 1 MG: Performed by: INTERNAL MEDICINE

## 2024-03-30 PROCEDURE — 94664 DEMO&/EVAL PT USE INHALER: CPT

## 2024-03-30 RX ADMIN — FUROSEMIDE 40 MG: 40 TABLET ORAL at 09:04

## 2024-03-30 RX ADMIN — NYSTATIN 500000 UNITS: 100000 SUSPENSION ORAL at 21:30

## 2024-03-30 RX ADMIN — IPRATROPIUM BROMIDE AND ALBUTEROL SULFATE 3 ML: .5; 3 SOLUTION RESPIRATORY (INHALATION) at 15:58

## 2024-03-30 RX ADMIN — Medication 10 ML: at 09:04

## 2024-03-30 RX ADMIN — NYSTATIN 500000 UNITS: 100000 SUSPENSION ORAL at 17:11

## 2024-03-30 RX ADMIN — NYSTATIN 500000 UNITS: 100000 SUSPENSION ORAL at 12:58

## 2024-03-30 RX ADMIN — IPRATROPIUM BROMIDE AND ALBUTEROL SULFATE 3 ML: .5; 3 SOLUTION RESPIRATORY (INHALATION) at 08:15

## 2024-03-30 RX ADMIN — CEFDINIR 300 MG: 300 CAPSULE ORAL at 09:03

## 2024-03-30 RX ADMIN — IPRATROPIUM BROMIDE AND ALBUTEROL SULFATE 3 ML: .5; 3 SOLUTION RESPIRATORY (INHALATION) at 12:11

## 2024-03-30 RX ADMIN — LISINOPRIL 10 MG: 10 TABLET ORAL at 09:03

## 2024-03-30 RX ADMIN — CEFDINIR 300 MG: 300 CAPSULE ORAL at 21:30

## 2024-03-30 RX ADMIN — PREDNISONE 40 MG: 20 TABLET ORAL at 09:04

## 2024-03-30 RX ADMIN — NYSTATIN 500000 UNITS: 100000 SUSPENSION ORAL at 09:04

## 2024-03-30 RX ADMIN — IPRATROPIUM BROMIDE AND ALBUTEROL SULFATE 3 ML: .5; 3 SOLUTION RESPIRATORY (INHALATION) at 20:43

## 2024-03-30 RX ADMIN — FUROSEMIDE 40 MG: 40 TABLET ORAL at 17:11

## 2024-03-30 RX ADMIN — Medication 10 ML: at 21:30

## 2024-03-30 NOTE — PROGRESS NOTES
Name: Josh Hinojosa ADMIT: 3/26/2024   : 1975  PCP: Provider, No Known    MRN: [id number] LOS: 3 days   AGE/SEX: 48 y.o. male  ROOM: [facility]/     Subjective   Subjective   Breathing okay since transition to nasal cannula yesterday.       Objective   Objective   Vital Signs  Temp:  [98.1 °F (36.7 °C)-99.3 °F (37.4 °C)] 98.1 °F (36.7 °C)  Heart Rate:  [77-92] 77  Resp:  [18-20] 18  BP: (123-142)/(76-86) 123/80  SpO2:  [90 %-100 %] 90 %  on  Flow (L/min):  [5-6] 5;   Device (Oxygen Therapy): humidified;nasal cannula  Body mass index is 52.42 kg/m².  Physical Exam  Vitals and nursing note reviewed.   Constitutional:       General: He is not in acute distress.     Appearance: He is obese. He is ill-appearing.   Cardiovascular:      Rate and Rhythm: Normal rate and regular rhythm.   Pulmonary:      Effort: Pulmonary effort is normal.      Breath sounds: Normal breath sounds.   Abdominal:      General: Bowel sounds are normal.      Palpations: Abdomen is soft.      Tenderness: There is no abdominal tenderness.   Musculoskeletal:         General: No swelling.   Skin:     General: Skin is warm and dry.   Neurological:      Mental Status: He is alert. Mental status is at baseline.       Results Review     I reviewed the patient's new clinical results.  Results from last 7 days   Lab Units 24  0749 24  0651   WBC 10*3/mm3 7.95 9.63 11.69* 10.37   HEMOGLOBIN g/dL 14.9 15.1 15.7 16.1   PLATELETS 10*3/mm3 168 190 227 223     Results from last 7 days   Lab Units 24  0437 24  0411 24  0735 24  0651   SODIUM mmol/L 142 141 139 138   POTASSIUM mmol/L 3.8 4.0 5.0 4.8   CHLORIDE mmol/L 99 99 97* 100   CO2 mmol/L 31.6* 32.0* 34.0* 29.0   BUN mg/dL 23* 20 21* 21*   CREATININE mg/dL 0.95 0.82 0.83 1.04   GLUCOSE mg/dL 93 93 132* 168*   EGFR mL/min/1.73 99.3 109.0 108.6 89.1     Results from last 7 days   Lab Units 24  0651   ALBUMIN g/dL 4.0   BILIRUBIN  "mg/dL 0.4   ALK PHOS U/L 58   AST (SGOT) U/L 21   ALT (SGPT) U/L 34     Results from last 7 days   Lab Units 03/29/24  0437 03/28/24  0411 03/27/24  0735 03/26/24  0651   CALCIUM mg/dL 8.8 8.7 9.5 9.3   ALBUMIN g/dL  --   --   --  4.0     Results from last 7 days   Lab Units 03/26/24  0651   PROCALCITONIN ng/mL 0.06     No results found for: \"HGBA1C\", \"POCGLU\"    No radiology results for the last day    I have personally reviewed all medications:  Scheduled Medications  cefdinir, 300 mg, Oral, Q12H  furosemide, 40 mg, Oral, BID  ipratropium-albuterol, 3 mL, Nebulization, 4x Daily - RT  lisinopril, 10 mg, Oral, Q24H  nystatin, 500,000 Units, Swish & Swallow, 4x Daily  predniSONE, 40 mg, Oral, Daily With Breakfast  sodium chloride, 10 mL, Intravenous, Q12H    Infusions   Diet  Diet: Regular/House, Cardiac; Low Sodium (2g); Fluid Consistency: Thin (IDDSI 0)    I have personally reviewed:  [x]  Laboratory   [x]  Microbiology   [x]  Radiology   [x]  EKG/Telemetry  [x]  Cardiology/Vascular   []  Pathology    []  Records       Assessment/Plan     Active Hospital Problems    Diagnosis  POA    **Bacterial pneumonia [J15.9]  Yes    Morbid obesity with BMI of 50.0-59.9, adult [E66.01, Z68.43]  Not Applicable    Acute on chronic diastolic congestive heart failure [I50.33]  Yes    Acute respiratory failure with hypoxia [J96.01]  Yes    Obstructive sleep apnea [G47.33]  Yes    COPD (chronic obstructive pulmonary disease) [J44.9]  Yes    Hypertension [I10]  Yes    Chronic respiratory failure with hypercapnia [J96.12]  Yes    Pleuritic chest pain [R07.81]  Yes      Resolved Hospital Problems   No resolved problems to display.       48 y.o. male admitted with Bacterial pneumonia.    Currently on nasal cannula at 5 L.    Appreciate pulmonology assistance.  Tapering steroids.  Plan discharge when cleared by pulmonology.     Echo done.  Patient cleared for discharge by cardiology.      SCDs for DVT prophylaxis.  Full code.  Discussed " with patient.  Anticipate discharge home when cleared by consultants.      Rui Ramsey MD  [facility]  03/30/24  12:06 EDT

## 2024-03-30 NOTE — PLAN OF CARE
Goal Outcome Evaluation:  Plan of Care Reviewed With: patient        Progress: improving  Outcome Evaluation: VSS; no compaints of pain; pt ad tre; trying to wean oxygen; safety maintained

## 2024-03-30 NOTE — PLAN OF CARE
Goal Outcome Evaluation:              Outcome Evaluation: O2 SATS STABLE AT  5 L/M N/C DURING DAY WHILE AWAKE AND USING BIPAP OVENIGHT. NO C/O'S VOICED.

## 2024-03-30 NOTE — PROGRESS NOTES
"      [facility]         Dr Soni Sayied   LOS: 3 days   Patient Care Team:  Provider, No Known as PCP - General    Chief Complaint: Acute hypoxemic respiratory failure with bilateral pneumonia morbid obesity multiple issues as listed below    Interval History: Patient has been transitioned to high flow cannula currently on 5 L.  Sats 90%.  He feels overall better.    REVIEW OF SYSTEMS:   CARDIOVASCULAR: No chest pain, chest pressure or chest discomfort. No palpitations or edema.   RESPIRATORY:  shortness of breath with activity.  GASTROINTESTINAL: No anorexia, nausea, vomiting or diarrhea. No abdominal pain or blood.   HEMATOLOGIC: No bleeding or bruising.     Ventilator/Non-Invasive Ventilation Settings (From admission, onward)       Start     Ordered    03/26/24 1218  NIPPV-IPPV / BIPAP / CPAP  At Bedtime & As Needed-RT        Question Answer Comment   Indication Sleep Apnea    Type BIPAP    IPAP 8    EPAP 18    Titrate Oxygen for SpO2 88 - 92%        03/26/24 1217                      Vital Signs  Temp:  [98.1 °F (36.7 °C)-99.3 °F (37.4 °C)] 98.1 °F (36.7 °C)  Heart Rate:  [77-92] 77  Resp:  [18-20] 18  BP: (123-142)/(76-86) 123/80    Intake/Output Summary (Last 24 hours) at 3/30/2024 1056  Last data filed at 3/30/2024 0515  Gross per 24 hour   Intake 360 ml   Output 300 ml   Net 60 ml     Flowsheet Rows      Flowsheet Row First Filed Value   Admission Height 175.3 cm (69\") Documented at 03/26/2024 0636   Admission Weight 159 kg (350 lb) Documented at 03/26/2024 0636            Physical Exam:  Patient is examined using the personal protective equipment as per guidelines from infection control for this particular patient as enacted.  Hand hygiene was performed before and after patient interaction.   General Appearance:    Alert, cooperative, in no acute distress.  Following simple commands  ENT Mallampati between 3 and 4 no nasal congestion  Neck midline trachea, no thyromegaly   Lungs:   " Diminished breath sounds with crackles bilaterally    Heart:    Regular rhythm and normal rate, normal S1 and S2, no            murmur, no gallop, no rub, no click   Chest Wall:    No abnormalities observed   Abdomen:     Normal bowel sounds, no masses, no organomegaly, soft        nontender, nondistended, no guarding, no rebound                tenderness   Extremities:   Moves all extremities well, no edema, no cyanosis, no             redness  CNS no focal neurological deficits normal sensory exam  Skin no rashes no nodules  Musculoskeletal no cyanosis no clubbing normal range of motion     Results Review:        Results from last 7 days   Lab Units 03/29/24  0437 03/28/24 0411 03/27/24  0735   SODIUM mmol/L 142 141 139   POTASSIUM mmol/L 3.8 4.0 5.0   CHLORIDE mmol/L 99 99 97*   CO2 mmol/L 31.6* 32.0* 34.0*   BUN mg/dL 23* 20 21*   CREATININE mg/dL 0.95 0.82 0.83   GLUCOSE mg/dL 93 93 132*   CALCIUM mg/dL 8.8 8.7 9.5     Results from last 7 days   Lab Units 03/26/24  0651   HSTROP T ng/L 16     Results from last 7 days   Lab Units 03/29/24  0437 03/28/24  0411 03/27/24  0749   WBC 10*3/mm3 7.95 9.63 11.69*   HEMOGLOBIN g/dL 14.9 15.1 15.7   HEMATOCRIT % 45.4 46.6 47.4   PLATELETS 10*3/mm3 168 190 227                     Results from last 7 days   Lab Units 03/26/24  0824   PH, ARTERIAL pH units 7.384   PO2 ART mm Hg 129.1*   PCO2, ARTERIAL mm Hg 54.4*   HCO3 ART mmol/L 32.5*       I reviewed the patient's new clinical results.  I personally viewed and interpreted the patient's chest x-ray.        Medication Review:   cefdinir, 300 mg, Oral, Q12H  furosemide, 40 mg, Oral, BID  ipratropium-albuterol, 3 mL, Nebulization, 4x Daily - RT  lisinopril, 10 mg, Oral, Q24H  nystatin, 500,000 Units, Swish & Swallow, 4x Daily  predniSONE, 40 mg, Oral, Daily With Breakfast  sodium chloride, 10 mL, Intravenous, Q12H             ASSESSMENT:   Acute hypoxic respiratory failure  Chronic hypoxic and hypercapnic respiratory  failure  Bibasilar consolidation, pneumonia vs atelectasis  Chronic HFpEF  COPD exacerbation  Current cigarette smoker  Current marijuana smoker  Super obese  SHARMILA    PLAN:  Patient transition to regular cannula on 5 L tolerating well.  Will continue to wean oxygen maintain sats of 90%  Bibasilar consolidation pneumonia versus atelectasis.  Continue and complete course of antibiotics switch to oral.  Follow-up CT chest in 6 to 8 weeks  At baseline patient uses 2 L oxygen.  Home CPAP if it is available can be started  Counseled patient to quit smoking  Once oxygen requirement under 4 L may discharge      Zachariah Londono MD  03/30/24  10:56 EDT

## 2024-03-30 NOTE — PROGRESS NOTES
[facility]   48 y.o.  male    LOS: 3 days   Patient Care Team:  Provider, No Known as PCP - General      Subjective   Sitting on a chair comfortably  Interval History:     Patient Complaints:     Review of Systems:       Medication Review:   Current Facility-Administered Medications:     acetaminophen (TYLENOL) tablet 650 mg, 650 mg, Oral, Q4H PRN **OR** acetaminophen (TYLENOL) 160 MG/5ML oral solution 650 mg, 650 mg, Oral, Q4H PRN **OR** acetaminophen (TYLENOL) suppository 650 mg, 650 mg, Rectal, Q4H PRN, Eduardo Luis MD    sennosides-docusate (PERICOLACE) 8.6-50 MG per tablet 2 tablet, 2 tablet, Oral, BID PRN **AND** polyethylene glycol (MIRALAX) packet 17 g, 17 g, Oral, Daily PRN **AND** bisacodyl (DULCOLAX) EC tablet 5 mg, 5 mg, Oral, Daily PRN **AND** bisacodyl (DULCOLAX) suppository 10 mg, 10 mg, Rectal, Daily PRN, Eduardo Luis MD    cefdinir (OMNICEF) capsule 300 mg, 300 mg, Oral, Q12H, Dougie Lang MD, 300 mg at 03/30/24 0903    furosemide (LASIX) tablet 40 mg, 40 mg, Oral, BID, RayRui MD, 40 mg at 03/30/24 0904    HYDROcodone-acetaminophen (NORCO) 5-325 MG per tablet 1 tablet, 1 tablet, Oral, Q6H PRN, Eduardo Luis MD, 1 tablet at 03/26/24 2335    ipratropium-albuterol (DUO-NEB) nebulizer solution 3 mL, 3 mL, Nebulization, 4x Daily - RT, Dougie Lang MD, 3 mL at 03/30/24 0815    ipratropium-albuterol (DUO-NEB) nebulizer solution 3 mL, 3 mL, Nebulization, Q4H PRN, Eduardo Luis MD    lisinopril (PRINIVIL,ZESTRIL) tablet 10 mg, 10 mg, Oral, Q24H, Eduardo Luis MD, 10 mg at 03/30/24 0903    morphine injection 4 mg, 4 mg, Intravenous, Q4H PRN, Eduardo Luis MD, 4 mg at 03/26/24 1609    nystatin (MYCOSTATIN) 100,000 unit/mL suspension 500,000 Units, 500,000 Units, Swish & Swallow, 4x Daily, Eduardo Luis MD, 500,000 Units at 03/30/24 0904    ondansetron ODT (ZOFRAN-ODT) disintegrating tablet 4 mg, 4 mg, Oral, Q6H PRN **OR** ondansetron (ZOFRAN) injection 4 mg, 4 mg, Intravenous, Q6H  PRN, Eduardo Luis MD    predniSONE (DELTASONE) tablet 40 mg, 40 mg, Oral, Daily With Breakfast, Dougie Lang MD, 40 mg at 03/30/24 0904    sodium chloride 0.9 % flush 10 mL, 10 mL, Intravenous, PRN, Robbie Daniel MD    sodium chloride 0.9 % flush 10 mL, 10 mL, Intravenous, Q12H, Eduardo Luis MD, 10 mL at 03/30/24 0904    sodium chloride 0.9 % flush 10 mL, 10 mL, Intravenous, PRN, Eduardo Luis MD    sodium chloride 0.9 % infusion 40 mL, 40 mL, Intravenous, Toby RODRIGUES Lyle E, MD      Objective   Vital Sign Min/Max for last 24 hours  Temp  Min: 98.1 °F (36.7 °C)  Max: 99.3 °F (37.4 °C)   BP  Min: 123/80  Max: 142/82    Pulse  Min: 77  Max: 92     Wt Readings from Last 3 Encounters:   03/29/24 (!) 161 kg (355 lb)   03/13/24 (!) 159 kg (349 lb 12.8 oz)        Intake/Output Summary (Last 24 hours) at 3/30/2024 0931  Last data filed at 3/30/2024 0515  Gross per 24 hour   Intake 360 ml   Output 300 ml   Net 60 ml     Physical Exam:      General Appearance:    Well developed and well nourished in no acute distress   Head:    Normocephalic, atraumatic   Eyes:            Conjunctivae normal, anicteric, no xanthelasma   Neck:   supple, trachea midline, no thyromegaly, no carotid bruit, no JVD, no elevated CVP   Lungs:     Clear to auscultation,respirations regular, even and                unlabored    Heart:    Regular rhythm and normal rate, normal S1 and S2,            No murmur, no gallop, no rub, no click   Chest Wall:    No abnormalities observed   Abdomen:     Normal bowel sounds, soft, nondistended           Rectal:     Deferred   Extremities:   No edema. Moves all extremities well, no cyanosis, no erythema   Pulses:   Pulses palpable and equal bilaterally   Skin:   No bleeding, bruising or rash   Neurologic:   awake alert and oriented x3, speech clear and approp, no facial drooping     :    Monitor:      Results Review:         Sodium Sodium   Date Value Ref Range Status   03/29/2024 142 136 - 299  "mmol/L Final   03/28/2024 141 136 - 145 mmol/L Final      Potassium Potassium   Date Value Ref Range Status   03/29/2024 3.8 3.5 - 5.2 mmol/L Final   03/28/2024 4.0 3.5 - 5.2 mmol/L Final     Comment:     Slight hemolysis detected by analyzer. Result may be falsely elevated.      Chloride Chloride   Date Value Ref Range Status   03/29/2024 99 98 - 107 mmol/L Final   03/28/2024 99 98 - 107 mmol/L Final      Bicarbonate No results found for: \"PLASMABICARB\"   BUN BUN   Date Value Ref Range Status   03/29/2024 23 (H) 6 - 20 mg/dL Final   03/28/2024 20 6 - 20 mg/dL Final      Creatinine Creatinine   Date Value Ref Range Status   03/29/2024 0.95 0.76 - 1.27 mg/dL Final   03/28/2024 0.82 0.76 - 1.27 mg/dL Final      Calcium Calcium   Date Value Ref Range Status   03/29/2024 8.8 8.6 - 10.5 mg/dL Final   03/28/2024 8.7 8.6 - 10.5 mg/dL Final      Magnesium No results found for: \"MG\"     Results from last 7 days   Lab Units 03/29/24  0437   WBC 10*3/mm3 7.95   HEMOGLOBIN g/dL 14.9   HEMATOCRIT % 45.4   PLATELETS 10*3/mm3 168     Lab Results   Lab Value Date/Time    TROPONINT 16 03/26/2024 0651    TROPONINT 44 (H) 03/07/2024 1742    TROPONINT 38 (H) 03/07/2024 1410            Echo EF Estimated  No results found for: \"ECHOEFEST\"    Echocardiogram Findings    Left Ventricle Left ventricular systolic function is normal. Left ventricular ejection fraction appears to be 61 - 65%.   Septal wall motion is normal. Normal left ventricular cavity size noted. Left ventricular wall thickness is consistent with mild concentric hypertrophy. All left ventricular wall segments contract normally. Left ventricular diastolic function was indeterminate. No evidence of left ventricular thrombus or mass present.   Right Ventricle Normal right ventricular cavity size, wall thickness, systolic function and septal motion noted.   Left Atrium Normal left atrial size and volume noted.  Saline test results are negative.   Right Atrium Normal right atrial " cavity size noted.   Aortic Valve The aortic valve is structurally normal.   Mitral Valve The mitral valve is normal in structure.   Tricuspid Valve Mild tricuspid valve regurgitation is present. Estimated right ventricular systolic pressure from tricuspid regurgitation is normal (<35 mmHg).   Greater Vessels No dilation of the aortic root is present. No dilation of the sinuses of Valsalva is present.   Pericardium There is no evidence of pericardial effusion. .       Assessment/ Plan  Assessment & Plan   Active Hospital Problems    Diagnosis  POA    **Bacterial pneumonia [J15.9]  Yes    Morbid obesity with BMI of 50.0-59.9, adult [E66.01, Z68.43]  Not Applicable    Acute on chronic diastolic congestive heart failure [I50.33]  Yes    Acute respiratory failure with hypoxia [J96.01]  Yes    Obstructive sleep apnea [G47.33]  Yes    COPD (chronic obstructive pulmonary disease) [J44.9]  Yes    Hypertension [I10]  Yes    Chronic respiratory failure with hypercapnia [J96.12]  Yes    Pleuritic chest pain [R07.81]  Yes   Acute hypoxic respiratory failure with chronic hypercapnic failure  PNA   Pulm foll on abx  Also on steroids for pleuritic pain  COPD  Chronic diastolic CHF    A. Echo  3/7/24: Left ventricular systolic function is hyperdynamic (EF > 70%). Calculated left ventricular EF = 78.8% Normal left ventricular cavity size noted. Left ventricular wall thickness is consistent with severe concentric hypertrophy. All left ventricular wall segments contract normally. Left ventricular diastolic function was normal.   The right ventricular cavity is mildly dilated. Normal right ventricular systolic function noted.  The left atrial cavity is moderately dilated. The right atrial cavity is moderately dilated.   Trace tricuspid valve regurgitation is present. Insufficient TR velocity profile to estimate the right ventricular systolic pressure.   Borderline dilation of the ascending aorta is present. Aortic arch = 3.7 cm   5.  SHARMILA/CPAP    Current echo shows only mild tricuspid regurgitation with RV systolic pressure 17 mmHg  Saline bubble study shows send no PFO  No right heart cath scheduled  Okay to discharge  We will follow as needed      Puneet Xavier MD  03/30/24  09:31 EDT

## 2024-03-31 ENCOUNTER — READMISSION MANAGEMENT (OUTPATIENT)
Dept: CALL CENTER | Facility: HOSPITAL | Age: 49
End: 2024-03-31
Payer: COMMERCIAL

## 2024-03-31 VITALS
BODY MASS INDEX: 46.65 KG/M2 | WEIGHT: 315 LBS | HEART RATE: 84 BPM | OXYGEN SATURATION: 96 % | RESPIRATION RATE: 14 BRPM | DIASTOLIC BLOOD PRESSURE: 73 MMHG | TEMPERATURE: 99 F | SYSTOLIC BLOOD PRESSURE: 106 MMHG | HEIGHT: 69 IN

## 2024-03-31 PROCEDURE — 63710000001 PREDNISONE PER 1 MG: Performed by: INTERNAL MEDICINE

## 2024-03-31 PROCEDURE — 94799 UNLISTED PULMONARY SVC/PX: CPT

## 2024-03-31 PROCEDURE — 94760 N-INVAS EAR/PLS OXIMETRY 1: CPT

## 2024-03-31 PROCEDURE — 94664 DEMO&/EVAL PT USE INHALER: CPT

## 2024-03-31 PROCEDURE — 94761 N-INVAS EAR/PLS OXIMETRY MLT: CPT

## 2024-03-31 PROCEDURE — 94660 CPAP INITIATION&MGMT: CPT

## 2024-03-31 RX ORDER — PREDNISONE 10 MG/1
TABLET ORAL
Qty: 30 TABLET | Refills: 0 | Status: SHIPPED | OUTPATIENT
Start: 2024-04-01 | End: 2024-04-13

## 2024-03-31 RX ADMIN — NYSTATIN 500000 UNITS: 100000 SUSPENSION ORAL at 08:39

## 2024-03-31 RX ADMIN — Medication 10 ML: at 08:39

## 2024-03-31 RX ADMIN — IPRATROPIUM BROMIDE AND ALBUTEROL SULFATE 3 ML: .5; 3 SOLUTION RESPIRATORY (INHALATION) at 11:43

## 2024-03-31 RX ADMIN — LISINOPRIL 10 MG: 10 TABLET ORAL at 08:38

## 2024-03-31 RX ADMIN — PREDNISONE 40 MG: 20 TABLET ORAL at 08:38

## 2024-03-31 RX ADMIN — FUROSEMIDE 40 MG: 40 TABLET ORAL at 08:39

## 2024-03-31 RX ADMIN — NYSTATIN 500000 UNITS: 100000 SUSPENSION ORAL at 12:05

## 2024-03-31 RX ADMIN — IPRATROPIUM BROMIDE AND ALBUTEROL SULFATE 3 ML: .5; 3 SOLUTION RESPIRATORY (INHALATION) at 08:43

## 2024-03-31 NOTE — OUTREACH NOTE
Prep Survey      Flowsheet Row Responses   Religious facility patient discharged from? [place]   Is LACE score < 7 ? No   Eligibility Readm Mgmt   Discharge diagnosis Bacterial pneumonia   Does the patient have one of the following disease processes/diagnoses(primary or secondary)? Pneumonia   Does the patient have Home health ordered? No   Is there a DME ordered? No   Prep survey completed? Yes            Catina DUFFY - Registered Nurse

## 2024-03-31 NOTE — PLAN OF CARE
Goal Outcome Evaluation:              Outcome Evaluation: HAD TO KEEP HIM ON 4 L/M N/C DURING DAY TO KEEP O2 SATS FROM DROPPING BELOW 90%. WAS UNABLE TO WEAN O2 DOWN. SATS STABLE ON BIPAP QVERNIGHT WHILE SLEEPING.

## 2024-03-31 NOTE — CASE MANAGEMENT/SOCIAL WORK
Case Management Discharge Note      Final Note: home         Selected Continued Care - Discharged on 3/31/2024 Admission date: 3/26/2024 - Discharge disposition: Home or Self Care      Destination    No services have been selected for the patient.                Durable Medical Equipment    No services have been selected for the patient.                Dialysis/Infusion    No services have been selected for the patient.                Home Medical Care    No services have been selected for the patient.                Therapy    No services have been selected for the patient.                Community Resources    No services have been selected for the patient.                Community & DME    No services have been selected for the patient.                    Selected Continued Care - Prior Encounters Includes continued care and service providers with selected services from prior encounters from 12/27/2023 to 3/31/2024      Discharged on 3/13/2024 Admission date: 3/7/2024 - Discharge disposition: Home or Self Care      Durable Medical Equipment       Service Provider Selected Services Address Phone Fax Patient Preferred    [facility] [id number] [address] [phone] --                               Final Discharge Disposition Code: 01 - home or self-care

## 2024-03-31 NOTE — DISCHARGE SUMMARY
Patient Name: Josh Hinojosa  : 1975  MRN: [id number]    Date of Admission: 3/26/2024  Date of Discharge:  3/31/2024  Primary Care Physician: Provider, No Known      Chief Complaint:   Shortness of Breath      Discharge Diagnoses     Active Hospital Problems    Diagnosis  POA    **Bacterial pneumonia [J15.9]  Yes    Morbid obesity with BMI of 50.0-59.9, adult [E66.01, Z68.43]  Not Applicable    Acute on chronic diastolic congestive heart failure [I50.33]  Yes    Acute respiratory failure with hypoxia [J96.01]  Yes    Obstructive sleep apnea [G47.33]  Yes    COPD (chronic obstructive pulmonary disease) [J44.9]  Yes    Hypertension [I10]  Yes    Chronic respiratory failure with hypercapnia [J96.12]  Yes    Pleuritic chest pain [R07.81]  Yes      Resolved Hospital Problems   No resolved problems to display.        Hospital Course     Mr. Hinojosa is a 48 y.o. male with a history of COPD and CHF who presented to [facility] initially complaining of pleuritic chest pain and shortness of breath.  Please see the admitting history and physical for further details.  He was found to have bilateral lower lobe infiltrates and severe hypoxia and was admitted to the hospital for further evaluation and treatment.  He had recently been in the hospital and treated for human metapneumovirus.  He was seen by pulmonology as well as cardiology.  He was given a course of antibiotic for presumed bacterial pneumonia.'s also treated with steroids and Lasix.  He is gradually weaned down to 3 L of oxygen and has been cleared for discharge by pulmonology and cardiology.  He will follow-up with pulmonology in 6 weeks and will need repeat CT scan chest.  He was counseled on the need to stop smoking.  Will send home on tapering prednisone.  He is completed antibiotics.    Is also felt he had decompensated heart failure and did respond to IV Lasix.  He will follow-up cardiology as outpatient.  Repeat echo here showed only  mild tricuspid regurgitation with RVSP 17 mmHg.  Initially they were considering right heart catheterization but decided against to this.  He will follow-up with his normal cardiologist.      Day of Discharge     Subjective:  Breathing better and wants to go home.    Physical Exam:  Temp:  [97.9 °F (36.6 °C)-99 °F (37.2 °C)] 99 °F (37.2 °C)  Heart Rate:  [72-89] 84  Resp:  [14-20] 14  BP: (106-143)/(73-85) 106/73  Body mass index is 52.42 kg/m².  Physical Exam  Vitals and nursing note reviewed.   Constitutional:       General: He is not in acute distress.     Appearance: He is obese. He is ill-appearing.   Cardiovascular:      Rate and Rhythm: Normal rate and regular rhythm.   Pulmonary:      Effort: Pulmonary effort is normal.      Breath sounds: Normal breath sounds.   Abdominal:      General: Bowel sounds are normal.      Palpations: Abdomen is soft.      Tenderness: There is no abdominal tenderness.   Musculoskeletal:         General: No swelling.   Skin:     General: Skin is warm and dry.   Neurological:      Mental Status: He is alert. Mental status is at baseline.         Consultants     Consult Orders (all) (From admission, onward)       Start     Ordered    03/26/24 1152  Inpatient Cardiology Consult  Once        Comments: Patient wishes to transfer to [facility] cardiology group   Specialty:  Cardiology  Provider:  Jeffrey Mcelroy MD    03/26/24 1151    03/26/24 1119  Inpatient Pulmonology Consult  Once        Specialty:  Pulmonary Disease  Provider:  Dougie Lang MD    03/26/24 1119    03/26/24 1020  [facility] (on-call MD unless specified) Details  Once,   Status:  Canceled        Specialty:  Hospitalist  Provider:  (Not yet assigned)    03/26/24 1019    03/26/24 1012  Pulmonology (on-call MD unless specified)  Once        Specialty:  Pulmonary Disease  Provider:  (Not yet assigned)    03/26/24 1011                  Procedures     * Surgery not found *    Imaging Results (All)       Procedure Component  Value Units Date/Time    CT Angiogram Chest [[id number]] Collected: 03/26/24 0947     Updated: 03/26/24 1003    Narrative:      CT ANGIOGRAM CHEST-     INDICATION: Pleuritic left chest pain. Hypoxia.     COMPARISON: None     TECHNIQUE:  Routine CTA chest with IV contrast. Coronal and sagittal reformats.  Three dimensional reconstructions. Radiation dose reduction techniques  were utilized, including automated exposure control and exposure  modulation based on body size.     FINDINGS:      Pulmonary arteries: Streak artifact from contrast in the venous system.  Suboptimal opacification of the pulmonary arterial system with the main  pulmonary artery measuring 138 Hounsfield units. Suboptimal evaluation  of segmental and subsegmental pulmonary arteries. No pulmonary embolism  identified.     Chest wall: No lymphadenopathy.     Thyroid: Visualized thyroid is normal.     Mediastinum: Coronary artery atherosclerotic calcifications. Borderline  cardiomegaly. No pericardial effusion. Enlarged main pulmonary artery  measuring 4.8 cm. AP window lymph node is mildly prominent measuring 1  cm. Subcarinal lymph node is mildly larger measuring 1.3 cm.     Lungs/pleura: Trace bilateral pleural fluid. Patent central airways.  Posterior dependent bilateral subsegmental atelectasis. Bibasilar lung  opacities, with some volume loss, greatest in the lower lobes and to a  lesser extent in the inferior right middle lobe and lingula.     Upper abdomen: Hepatic steatosis suspected. Cholelithiasis.  Nonobstructing nephrolithiasis seen in the superior pole right kidney  measures 2 mm. Small fluid attenuating cyst in the right mid kidney.     Osseous structures: No destructive osseous lesions..       Impression:         1. No pulmonary embolism identified though there is suboptimal  opacification of the pulmonary arterial system and segmental and  subsegmental pulmonary arteries are poorly evaluated.  2. Enlarged main pulmonary artery, can be  seen with pulmonary artery  hypertension.  3. Bibasilar lung opacities may represent partial atelectasis or  bronchopneumonia in the appropriate clinical setting.  4. Borderline cardiomegaly.  5. Hepatic steatosis.  6. Cholelithiasis     This report was finalized on 3/26/2024 10:00 AM by Dr. Mati Kirby M.D on Workstation: [id number]       XR Chest 1 View [[id number]] Collected: 03/26/24 0721     Updated: 03/26/24 0730    Narrative:      Portable chest radiograph     HISTORY: Shortness of air     TECHNIQUE: Single AP portable radiograph of the chest     COMPARISON: Multiple chest radiographs dating back to 3/9/2024       Impression:      FINDINGS AND IMPRESSION:  The lungs are hypoinflated. Evaluation is suboptimal due to patient body  habitus. There is an absence of lung markings within the right lung apex  with curvilinear hyperdensity projecting through this area not  definitively seen on prior radiograph. Small pneumothorax cannot be  excluded and further evaluation with chest CT is recommended.     There is mild to moderate bibasal pulmonary opacification, left greater  than right, which has mildly worsened since 3/12/2024. Attention on  above-mentioned chest CT is recommended to further characterize. Cardiac  silhouette is accentuated by low lung volumes.     This report was finalized on 3/26/2024 7:27 AM by Dr. Dutch Balbuena M.D  on Workstation: [id number]               Results for orders placed during the hospital encounter of 03/26/24    Adult Transthoracic Echo Complete W/ Cont if Necessary Per Protocol    Interpretation Summary    Left ventricular systolic function is normal. Left ventricular ejection fraction appears to be 61 - 65%.    Left ventricular wall thickness is consistent with mild concentric hypertrophy.    Left ventricular diastolic function was indeterminate.    Saline test results are negative.    Estimated right ventricular systolic pressure from tricuspid regurgitation is normal  "(<35 mmHg).    Pertinent Labs     Results from last 7 days   Lab Units 03/29/24 0437 03/28/24 0411 03/27/24  0749 03/26/24  0651   WBC 10*3/mm3 7.95 9.63 11.69* 10.37   HEMOGLOBIN g/dL 14.9 15.1 15.7 16.1   PLATELETS 10*3/mm3 168 190 227 223     Results from last 7 days   Lab Units 03/29/24 0437 03/28/24 0411 03/27/24  0735 03/26/24  0651   SODIUM mmol/L 142 141 139 138   POTASSIUM mmol/L 3.8 4.0 5.0 4.8   CHLORIDE mmol/L 99 99 97* 100   CO2 mmol/L 31.6* 32.0* 34.0* 29.0   BUN mg/dL 23* 20 21* 21*   CREATININE mg/dL 0.95 0.82 0.83 1.04   GLUCOSE mg/dL 93 93 132* 168*   EGFR mL/min/1.73 99.3 109.0 108.6 89.1     Results from last 7 days   Lab Units 03/26/24  0651   ALBUMIN g/dL 4.0   BILIRUBIN mg/dL 0.4   ALK PHOS U/L 58   AST (SGOT) U/L 21   ALT (SGPT) U/L 34     Results from last 7 days   Lab Units 03/29/24 0437 03/28/24 0411 03/27/24  0735 03/26/24  0651   CALCIUM mg/dL 8.8 8.7 9.5 9.3   ALBUMIN g/dL  --   --   --  4.0       Results from last 7 days   Lab Units 03/26/24  0651   HSTROP T ng/L 16   PROBNP pg/mL 52.3           Invalid input(s): \"LDLCALC\"  Results from last 7 days   Lab Units 03/27/24  0414   RESPCX  Rejected     Results from last 7 days   Lab Units 03/26/24  1633   COVID19  Not Detected       Test Results Pending at Discharge       Discharge Details        Discharge Medications        New Medications        Instructions Start Date   predniSONE 10 MG tablet  Commonly known as: DELTASONE   Take 4 tablets by mouth Daily With Breakfast for 3 days, THEN 3 tablets Daily With Breakfast for 3 days, THEN 2 tablets Daily With Breakfast for 3 days, THEN 1 tablet Daily With Breakfast for 3 days.   Start Date: April 1, 2024            Continue These Medications        Instructions Start Date   furosemide 40 MG tablet  Commonly known as: Lasix   40 mg, Oral, Daily      ipratropium-albuterol 0.5-2.5 mg/3 ml nebulizer  Commonly known as: DUO-NEB   3 mL, Nebulization, Every 4 Hours PRN      lisinopril 10 MG " tablet  Commonly known as: PRINIVIL,ZESTRIL   10 mg, Oral, Every 24 Hours Scheduled      nystatin 100,000 unit/mL suspension  Commonly known as: MYCOSTATIN   500,000 Units, Swish & Swallow, 4 Times Daily      Trelegy Ellipta 200-62.5-25 MCG/ACT inhaler  Generic drug: Fluticasone-Umeclidin-Vilant   1 puff, Inhalation, Daily - RT               No Known Allergies    Discharge Disposition:  Home or Self Care      Discharge Diet:  Diet Order   Procedures    Diet: Regular/House, Cardiac; Low Sodium (2g); Fluid Consistency: Thin (IDDSI 0)       Discharge Activity:   Activity Instructions       Activity as Tolerated              CODE STATUS:    Code Status and Medical Interventions:   Ordered at: 03/26/24 1803     Level Of Support Discussed With:    Patient     Code Status (Patient has no pulse and is not breathing):    CPR (Attempt to Resuscitate)     Medical Interventions (Patient has pulse or is breathing):    Full Support       No future appointments.  Additional Instructions for the Follow-ups that You Need to Schedule       Discharge Follow-up with PCP   As directed       Currently Documented PCP:    Provider, No Known    PCP Phone Number:    [phone]     Follow Up Details: 1 to 2 weeks (or sooner if problems)               Follow-up Information       Provider, No Known .    Why: 1 to 2 weeks (or sooner if problems)  Contact information:  [place] [address]  [phone]               Zachariah Londono MD Follow up in 6 week(s).    Specialties: Pulmonary Disease, Sleep Medicine, Intensive Care  Contact information:  [address] [id number] [phone]                             Additional Instructions for the Follow-ups that You Need to Schedule       Discharge Follow-up with PCP   As directed       Currently Documented PCP:    Provider, No Known    PCP Phone Number:    [phone]     Follow Up Details: 1 to 2 weeks (or sooner if problems)            Time Spent on  Discharge:  Greater than 30 minutes      Rui Ramsey MD  [facility]  03/31/24  13:14 EDT

## 2024-03-31 NOTE — PROGRESS NOTES
"      [facility]         Dr Soni Sayied   LOS: 4 days   Patient Care Team:  Provider, No Known as PCP - General    Chief Complaint: Acute hypoxemic respiratory failure with bilateral pneumonia morbid obesity multiple issues as listed below    Interval History: Oxygen requirement improved down to 3 L nasal cannula.  Sitting up in a chair no overnight issues reported    REVIEW OF SYSTEMS:   CARDIOVASCULAR: No chest pain, chest pressure or chest discomfort. No palpitations or edema.   RESPIRATORY:  shortness of breath with activity.  GASTROINTESTINAL: No anorexia, nausea, vomiting or diarrhea. No abdominal pain or blood.   HEMATOLOGIC: No bleeding or bruising.     Ventilator/Non-Invasive Ventilation Settings (From admission, onward)       Start     Ordered    03/26/24 1218  NIPPV-IPPV / BIPAP / CPAP  At Bedtime & As Needed-RT        Question Answer Comment   Indication Sleep Apnea    Type BIPAP    IPAP 8    EPAP 18    Titrate Oxygen for SpO2 88 - 92%        03/26/24 1217                      Vital Signs  Temp:  [97.9 °F (36.6 °C)-99 °F (37.2 °C)] 99 °F (37.2 °C)  Heart Rate:  [72-92] 84  Resp:  [14-20] 14  BP: (106-143)/(72-85) 106/73    Intake/Output Summary (Last 24 hours) at 3/31/2024 1308  Last data filed at 3/31/2024 1140  Gross per 24 hour   Intake 240 ml   Output 3400 ml   Net -3160 ml     Flowsheet Rows      Flowsheet Row First Filed Value   Admission Height 175.3 cm (69\") Documented at 03/26/2024 0636   Admission Weight 159 kg (350 lb) Documented at 03/26/2024 0636            Physical Exam:  Patient is examined using the personal protective equipment as per guidelines from infection control for this particular patient as enacted.  Hand hygiene was performed before and after patient interaction.   General Appearance:    Alert, cooperative, in no acute distress.  Following simple commands  ENT Mallampati between 3 and 4 no nasal congestion  Neck midline trachea, no thyromegaly   Lungs:   " Diminished breath sounds with crackles bilaterally    Heart:    Regular rhythm and normal rate, normal S1 and S2, no            murmur, no gallop, no rub, no click   Chest Wall:    No abnormalities observed   Abdomen:     Normal bowel sounds, no masses, no organomegaly, soft        nontender, nondistended, no guarding, no rebound                tenderness   Extremities:   Moves all extremities well, no edema, no cyanosis, no             redness  CNS no focal neurological deficits normal sensory exam  Skin no rashes no nodules  Musculoskeletal no cyanosis no clubbing normal range of motion     Results Review:        Results from last 7 days   Lab Units 03/29/24  0437 03/28/24  0411 03/27/24  0735   SODIUM mmol/L 142 141 139   POTASSIUM mmol/L 3.8 4.0 5.0   CHLORIDE mmol/L 99 99 97*   CO2 mmol/L 31.6* 32.0* 34.0*   BUN mg/dL 23* 20 21*   CREATININE mg/dL 0.95 0.82 0.83   GLUCOSE mg/dL 93 93 132*   CALCIUM mg/dL 8.8 8.7 9.5     Results from last 7 days   Lab Units 03/26/24  0651   HSTROP T ng/L 16     Results from last 7 days   Lab Units 03/29/24  0437 03/28/24  0411 03/27/24  0749   WBC 10*3/mm3 7.95 9.63 11.69*   HEMOGLOBIN g/dL 14.9 15.1 15.7   HEMATOCRIT % 45.4 46.6 47.4   PLATELETS 10*3/mm3 168 190 227                     Results from last 7 days   Lab Units 03/26/24  0824   PH, ARTERIAL pH units 7.384   PO2 ART mm Hg 129.1*   PCO2, ARTERIAL mm Hg 54.4*   HCO3 ART mmol/L 32.5*       I reviewed the patient's new clinical results.  I personally viewed and interpreted the patient's chest x-ray.        Medication Review:   furosemide, 40 mg, Oral, BID  ipratropium-albuterol, 3 mL, Nebulization, 4x Daily - RT  lisinopril, 10 mg, Oral, Q24H  nystatin, 500,000 Units, Swish & Swallow, 4x Daily  predniSONE, 40 mg, Oral, Daily With Breakfast  sodium chloride, 10 mL, Intravenous, Q12H             ASSESSMENT:   Acute hypoxic respiratory failure  Chronic hypoxic and hypercapnic respiratory failure  Bibasilar consolidation,  pneumonia vs atelectasis  Chronic HFpEF  COPD exacerbation  Current cigarette smoker  Current marijuana smoker  Super obese  SHARMILA    PLAN:  Oxygen requirement stable and improving.  Clinically improved.  Bibasilar consolidation pneumonia versus atelectasis.  Continue and complete course of antibiotics switch to oral.  Follow-up CT chest in 6 to 8 weeks  At baseline patient uses 2 L oxygen.  Home CPAP if it is available can be started  Counseled patient to quit smoking  No objections to discharge discussed with nursing staff.  Follow-up with me in 6 weeks in the office.      Zachariah Londono MD  03/31/24  13:08 EDT

## 2024-03-31 NOTE — DISCHARGE INSTRUCTIONS
Call the pulmonology office at [phone] to schedule a follow up appointment with Dr. Londono in 6 weeks.

## 2024-04-04 ENCOUNTER — READMISSION MANAGEMENT (OUTPATIENT)
Dept: CALL CENTER | Facility: HOSPITAL | Age: 49
End: 2024-04-04
Payer: COMMERCIAL

## 2024-04-04 NOTE — OUTREACH NOTE
COPD/PN Week 1 Survey      Flowsheet Row Responses   [facility] patient discharged from? [facility]   Does the patient have one of the following disease processes/diagnoses(primary or secondary)? Pneumonia   Week 1 attempt successful? Yes   Call start time 1236   Call end time 1240   Discharge diagnosis Bacterial pneumonia   Person spoke with today (if not patient) and relationship Patient   Meds reviewed with patient/caregiver? Yes   Does the patient have all medications ordered at discharge? Yes   Is the patient taking all medications as directed (includes completed medication regime)? Yes   Does the patient have a primary care provider?  Yes   Does the patient have an appointment with their PCP or specialist within 7 days of discharge? Greater than 7 days   Comments regarding PCP Patient reports that he set up new PCP appt for 4/8   Nursing Interventions Verified appointment date/time/provider   Has the patient kept scheduled appointments due by today? N/A   Has home health visited the patient within 72 hours of discharge? N/A   DME comments Wearing home O23L   Pulse Ox monitoring Intermittent   Pulse Ox device source Patient   O2 Sat comments Patient reports readings usually in the 90's,  reports that sometimes it will drop   O2 Sat: education provided Sat levels, Monitoring frequency, When to seek care   Psychosocial issues? No   Did the patient receive a copy of their discharge instructions? Yes   Nursing interventions Reviewed instructions with patient   What is the patient's perception of their health status since discharge? Improving   Is the patient/caregiver able to teach back the hierarchy of who to call/visit for symptoms/problems? PCP, Specialist, Home health nurse, Urgent Care, ED, 911 Yes   Is the patient/caregiver able to teach back signs and symptoms of worsening condition: Fever/chills, Shortness of breath, Chest pain   Is the patient/caregiver able to teach back importance of completing  antibiotic course of treatment? --  [N/A. Patient not sent home on antibiotic]   Week 1 call completed? Yes   Is the patient interested in additional calls from an ambulatory ? No   Would this patient benefit from a Referral to [facility] Social Work? No   Call end time 4045            LEMUEL LEBLANC - Registered Nurse

## 2024-04-08 ENCOUNTER — LAB (OUTPATIENT)
Dept: LAB | Facility: HOSPITAL | Age: 49
End: 2024-04-08
Payer: COMMERCIAL

## 2024-04-08 ENCOUNTER — OFFICE VISIT (OUTPATIENT)
Dept: CARDIOLOGY | Facility: CLINIC | Age: 49
End: 2024-04-08
Payer: COMMERCIAL

## 2024-04-08 VITALS
HEIGHT: 69 IN | BODY MASS INDEX: 46.65 KG/M2 | SYSTOLIC BLOOD PRESSURE: 130 MMHG | HEART RATE: 75 BPM | DIASTOLIC BLOOD PRESSURE: 80 MMHG | WEIGHT: 315 LBS

## 2024-04-08 DIAGNOSIS — I50.33 ACUTE ON CHRONIC DIASTOLIC CONGESTIVE HEART FAILURE: Primary | ICD-10-CM

## 2024-04-08 DIAGNOSIS — I50.33 ACUTE ON CHRONIC DIASTOLIC CONGESTIVE HEART FAILURE: ICD-10-CM

## 2024-04-08 LAB
ANION GAP SERPL CALCULATED.3IONS-SCNC: 10.2 MMOL/L (ref 5–15)
BUN SERPL-MCNC: 21 MG/DL (ref 6–20)
BUN/CREAT SERPL: 23.9 (ref 7–25)
CALCIUM SPEC-SCNC: 9.7 MG/DL (ref 8.6–10.5)
CHLORIDE SERPL-SCNC: 99 MMOL/L (ref 98–107)
CO2 SERPL-SCNC: 31.8 MMOL/L (ref 22–29)
CREAT SERPL-MCNC: 0.88 MG/DL (ref 0.76–1.27)
EGFRCR SERPLBLD CKD-EPI 2021: 106.7 ML/MIN/1.73
GLUCOSE SERPL-MCNC: 124 MG/DL (ref 65–99)
POTASSIUM SERPL-SCNC: 4.6 MMOL/L (ref 3.5–5.2)
SODIUM SERPL-SCNC: 141 MMOL/L (ref 136–145)

## 2024-04-08 PROCEDURE — 36415 COLL VENOUS BLD VENIPUNCTURE: CPT

## 2024-04-08 PROCEDURE — 3079F DIAST BP 80-89 MM HG: CPT | Performed by: INTERNAL MEDICINE

## 2024-04-08 PROCEDURE — 99204 OFFICE O/P NEW MOD 45 MIN: CPT | Performed by: INTERNAL MEDICINE

## 2024-04-08 PROCEDURE — 1159F MED LIST DOCD IN RCRD: CPT | Performed by: INTERNAL MEDICINE

## 2024-04-08 PROCEDURE — 1160F RVW MEDS BY RX/DR IN RCRD: CPT | Performed by: INTERNAL MEDICINE

## 2024-04-08 PROCEDURE — 93000 ELECTROCARDIOGRAM COMPLETE: CPT | Performed by: INTERNAL MEDICINE

## 2024-04-08 PROCEDURE — 80048 BASIC METABOLIC PNL TOTAL CA: CPT

## 2024-04-08 PROCEDURE — 3075F SYST BP GE 130 - 139MM HG: CPT | Performed by: INTERNAL MEDICINE

## 2024-04-08 RX ORDER — TORSEMIDE 20 MG/1
20 TABLET ORAL DAILY
Qty: 90 TABLET | Refills: 3 | Status: SHIPPED | OUTPATIENT
Start: 2024-04-08

## 2024-04-08 NOTE — PROGRESS NOTES
[facility]      Patient Name: Josh Hinojosa  :1975  Age: 48 y.o.  Encounter Provider:  Hermann Torres Jr, MD      Chief Complaint:   Chief Complaint   Patient presents with    Congestive Heart Failure         Congestive Heart Failure  Associated symptoms include shortness of breath. Pertinent negatives include no abdominal pain, chest pain, near-syncope or palpitations.     Josh Hinojosa is a 48 y.o. male with morbid obesity, COPD who presents for hospital follow-up.  He was recently diagnosed and hospitalized with metapneumovirus pneumonia.  He was back in the hospital with acute exacerbation of COPD versus acute on chronic diastolic heart failure.  He did diurese well and was sent home with supplemental oxygen.  This is the third hospitalization in the past 5 years for which she is required oxygen afterward.  States his weight has been quite labile and a few years ago he was able to lose 100 pounds but has gained all of that back.  He did quit smoking about a month ago.  Fair functional capacity with the use of oxygen and no chest pain.  No orthopnea, PND or edema but he feels that he is starting to gain weight again.  Family history of coronary artery disease.  He denies alcohol and admits to marijuana use.  No cardiac complaints at time of interview.      The following portions of the patient's history were reviewed and updated as appropriate: allergies, current medications, past family history, past medical history, past social history, past surgical history and problem list.      Review of Systems   Constitutional: Negative for chills and fever.   HENT:  Negative for hoarse voice and sore throat.    Eyes:  Negative for double vision and photophobia.   Cardiovascular:  Positive for dyspnea on exertion. Negative for chest pain, leg swelling, near-syncope, orthopnea, palpitations, paroxysmal nocturnal dyspnea and syncope.   Respiratory:  Positive for shortness of breath. Negative  "for cough and wheezing.    Skin:  Negative for poor wound healing and rash.   Musculoskeletal:  Negative for arthritis and joint swelling.   Gastrointestinal:  Negative for bloating, abdominal pain, hematemesis and hematochezia.   Neurological:  Negative for dizziness and focal weakness.   Psychiatric/Behavioral:  Negative for depression and suicidal ideas.        OBJECTIVE:   Vital Signs  Vitals:    04/08/24 1108   BP: 130/80   Pulse: 75     Estimated body mass index is 55.23 kg/m² as calculated from the following:    Height as of this encounter: 175.3 cm (69\").    Weight as of this encounter: 170 kg (374 lb).    Vitals reviewed.   Constitutional:       Appearance: Not in distress. Chronically ill-appearing.   Neck:      Comments: JVD difficult to assess given body habitus  Pulmonary:      Breath sounds: No wheezing. No rhonchi. No rales.      Comments: Poor air entry bilateral zones with no adventitious breath sounds appreciated  Chest:      Chest wall: Not tender to palpatation.   Cardiovascular:      PMI at left midclavicular line. Normal rate. Regular rhythm. Normal S1. Normal S2.       Murmurs: There is no murmur.      No gallop.  No click. No rub.   Pulses:     Intact distal pulses.   Edema:     Thigh: bilateral trace edema of the thigh.     Pretibial: bilateral trace edema of the pretibial area.     Ankle: bilateral trace edema of the ankle.     Feet: bilateral trace edema of the feet.  Abdominal:      General: Bowel sounds are normal.      Palpations: Abdomen is soft.      Tenderness: There is no abdominal tenderness.   Musculoskeletal: Normal range of motion.         General: No tenderness. Skin:     General: Skin is warm and dry.   Neurological:      General: No focal deficit present.      Mental Status: Alert and oriented to person, place and time.         ECG 12 Lead    Date/Time: 4/8/2024 11:39 AM  Performed by: Hermann Torres Jr., MD    Authorized by: Hermann Torres Jr., MD  Comparison: compared with " previous ECG from 3/8/2024  Similar to previous ECG  Rhythm: sinus rhythm  Conduction: right bundle branch block    Clinical impression: abnormal EKG           BUN   Date Value Ref Range Status   03/29/2024 23 (H) 6 - 20 mg/dL Final     Creatinine   Date Value Ref Range Status   03/29/2024 0.95 0.76 - 1.27 mg/dL Final     Potassium   Date Value Ref Range Status   03/29/2024 3.8 3.5 - 5.2 mmol/L Final   12/23/2021 3.5 (L) 3.7 - 5.0 mmol/L Final     ALT (SGPT)   Date Value Ref Range Status   03/26/2024 34 1 - 41 U/L Final     Comment:     Specimen hemolyzed.  Result may  be falsely elevated.     AST (SGOT)   Date Value Ref Range Status   03/26/2024 21 1 - 40 U/L Final     Comment:     Specimen hemolyzed.  Result may be falsely elevated.           ASSESSMENT:     48-year-old male with morbid obesity, COPD on home oxygen presents for acute on chronic diastolic heart failure      PLAN OF CARE:     Acute on chronic diastolic heart failure -not sure that Lasix will be a good choice for him moving forward.  We will check a BMP today.  If possible we will switch him over to torsemide.  Strong recommendation for weight loss and increased activity as tolerated.  COPD -Home oxygen dependence.  Follows with pulmonary.  History of tobacco abuse -encourage remained abstinence from nicotine products.  Morbid obesity -weight loss and exercise recommended  Hypertension -seemingly well-controlled at this time.  Mild concentric hypertrophy noted on echocardiogram.  Abnormal EKG -chronic right bundle branch block with no acute pathology on echocardiogram last month    Return to clinic 6 months    Addendum: Labs reviewed.  Switch to torsemide 20 mg.         Discharge Medications            Accurate as of April 8, 2024 11:17 AM. If you have any questions, ask your nurse or doctor.                Continue These Medications        Instructions Start Date   furosemide 40 MG tablet  Commonly known as: Lasix   40 mg, Oral, Daily       ipratropium-albuterol 0.5-2.5 mg/3 ml nebulizer  Commonly known as: DUO-NEB   3 mL, Nebulization, Every 4 Hours PRN      lisinopril 10 MG tablet  Commonly known as: PRINIVIL,ZESTRIL   10 mg, Oral, Every 24 Hours Scheduled      nystatin 100,000 unit/mL suspension  Commonly known as: MYCOSTATIN   500,000 Units, Swish & Swallow, 4 Times Daily      predniSONE 10 MG tablet  Commonly known as: DELTASONE   Take 4 tablets by mouth Daily With Breakfast for 3 days, THEN 3 tablets Daily With Breakfast for 3 days, THEN 2 tablets Daily With Breakfast for 3 days, THEN 1 tablet Daily With Breakfast for 3 days.   Start Date: April 1, 2024     Trelegy Ellipta 200-62.5-25 MCG/ACT inhaler  Generic drug: Fluticasone-Umeclidin-Vilant   1 puff, Inhalation, Daily - RT               Thank you for allowing me to participate in the care of your patient,      Sincerely,   Hermann Torres MD  [facility]  04/08/24  11:17 EDT

## 2024-04-17 ENCOUNTER — OFFICE VISIT (OUTPATIENT)
Dept: FAMILY MEDICINE CLINIC | Facility: CLINIC | Age: 49
End: 2024-04-17
Payer: COMMERCIAL

## 2024-04-17 DIAGNOSIS — J96.12 CHRONIC RESPIRATORY FAILURE WITH HYPERCAPNIA: ICD-10-CM

## 2024-04-17 DIAGNOSIS — J44.9 CHRONIC OBSTRUCTIVE PULMONARY DISEASE, UNSPECIFIED COPD TYPE: Chronic | ICD-10-CM

## 2024-04-17 DIAGNOSIS — B37.2 CANDIDIASIS, INTERTRIGO: ICD-10-CM

## 2024-04-17 DIAGNOSIS — F17.211 CIGARETTE NICOTINE DEPENDENCE IN REMISSION: ICD-10-CM

## 2024-04-17 DIAGNOSIS — R73.09 ELEVATED GLUCOSE LEVEL: ICD-10-CM

## 2024-04-17 DIAGNOSIS — Z13.220 SCREENING CHOLESTEROL LEVEL: ICD-10-CM

## 2024-04-17 DIAGNOSIS — G47.33 OBSTRUCTIVE SLEEP APNEA: ICD-10-CM

## 2024-04-17 DIAGNOSIS — J15.9 BACTERIAL PNEUMONIA: ICD-10-CM

## 2024-04-17 DIAGNOSIS — E66.01 MORBID OBESITY WITH BMI OF 50.0-59.9, ADULT: ICD-10-CM

## 2024-04-17 DIAGNOSIS — J96.01 ACUTE RESPIRATORY FAILURE WITH HYPOXIA: ICD-10-CM

## 2024-04-17 DIAGNOSIS — F12.90 MARIJUANA SMOKER, CONTINUOUS: ICD-10-CM

## 2024-04-17 DIAGNOSIS — E11.65 TYPE 2 DIABETES MELLITUS WITH HYPERGLYCEMIA, WITHOUT LONG-TERM CURRENT USE OF INSULIN: ICD-10-CM

## 2024-04-17 DIAGNOSIS — I10 PRIMARY HYPERTENSION: Primary | Chronic | ICD-10-CM

## 2024-04-17 DIAGNOSIS — I50.33 ACUTE ON CHRONIC DIASTOLIC CONGESTIVE HEART FAILURE: ICD-10-CM

## 2024-04-17 LAB
EXPIRATION DATE: ABNORMAL
HBA1C MFR BLD: 6.6 % (ref 4.5–5.7)
Lab: ABNORMAL

## 2024-04-17 PROCEDURE — 99204 OFFICE O/P NEW MOD 45 MIN: CPT | Performed by: NURSE PRACTITIONER

## 2024-04-17 PROCEDURE — 3079F DIAST BP 80-89 MM HG: CPT | Performed by: NURSE PRACTITIONER

## 2024-04-17 PROCEDURE — 3074F SYST BP LT 130 MM HG: CPT | Performed by: NURSE PRACTITIONER

## 2024-04-17 PROCEDURE — 83036 HEMOGLOBIN GLYCOSYLATED A1C: CPT | Performed by: NURSE PRACTITIONER

## 2024-04-17 PROCEDURE — 3044F HG A1C LEVEL LT 7.0%: CPT | Performed by: NURSE PRACTITIONER

## 2024-04-17 RX ORDER — NYSTATIN 100000 [USP'U]/G
POWDER TOPICAL 3 TIMES DAILY
Qty: 60 G | Refills: 1 | Status: SHIPPED | OUTPATIENT
Start: 2024-04-17

## 2024-04-18 LAB
ALBUMIN SERPL-MCNC: 4.7 G/DL (ref 3.5–5.2)
ALBUMIN/GLOB SERPL: 2 G/DL
ALP SERPL-CCNC: 64 U/L (ref 39–117)
ALT SERPL-CCNC: 30 U/L (ref 1–41)
AST SERPL-CCNC: 17 U/L (ref 1–40)
BASOPHILS # BLD AUTO: 0.06 10*3/MM3 (ref 0–0.2)
BASOPHILS NFR BLD AUTO: 0.8 % (ref 0–1.5)
BILIRUB SERPL-MCNC: 0.3 MG/DL (ref 0–1.2)
BUN SERPL-MCNC: 21 MG/DL (ref 6–20)
BUN/CREAT SERPL: 20.2 (ref 7–25)
CALCIUM SERPL-MCNC: 10 MG/DL (ref 8.6–10.5)
CHLORIDE SERPL-SCNC: 98 MMOL/L (ref 98–107)
CHOLEST SERPL-MCNC: 203 MG/DL (ref 0–200)
CO2 SERPL-SCNC: 34.5 MMOL/L (ref 22–29)
CREAT SERPL-MCNC: 1.04 MG/DL (ref 0.76–1.27)
EGFRCR SERPLBLD CKD-EPI 2021: 89.1 ML/MIN/1.73
EOSINOPHIL # BLD AUTO: 0.17 10*3/MM3 (ref 0–0.4)
EOSINOPHIL NFR BLD AUTO: 2.2 % (ref 0.3–6.2)
ERYTHROCYTE [DISTWIDTH] IN BLOOD BY AUTOMATED COUNT: 13.2 % (ref 12.3–15.4)
GLOBULIN SER CALC-MCNC: 2.3 GM/DL
GLUCOSE SERPL-MCNC: 111 MG/DL (ref 65–99)
HCT VFR BLD AUTO: 46.5 % (ref 37.5–51)
HDLC SERPL-MCNC: 40 MG/DL (ref 40–60)
HGB BLD-MCNC: 15.4 G/DL (ref 13–17.7)
IMM GRANULOCYTES # BLD AUTO: 0.04 10*3/MM3 (ref 0–0.05)
IMM GRANULOCYTES NFR BLD AUTO: 0.5 % (ref 0–0.5)
LDLC SERPL CALC-MCNC: 141 MG/DL (ref 0–100)
LYMPHOCYTES # BLD AUTO: 2.11 10*3/MM3 (ref 0.7–3.1)
LYMPHOCYTES NFR BLD AUTO: 27.7 % (ref 19.6–45.3)
MCH RBC QN AUTO: 30.1 PG (ref 26.6–33)
MCHC RBC AUTO-ENTMCNC: 33.1 G/DL (ref 31.5–35.7)
MCV RBC AUTO: 91 FL (ref 79–97)
MONOCYTES # BLD AUTO: 0.58 10*3/MM3 (ref 0.1–0.9)
MONOCYTES NFR BLD AUTO: 7.6 % (ref 5–12)
NEUTROPHILS # BLD AUTO: 4.65 10*3/MM3 (ref 1.7–7)
NEUTROPHILS NFR BLD AUTO: 61.2 % (ref 42.7–76)
NRBC BLD AUTO-RTO: 0 /100 WBC (ref 0–0.2)
PLATELET # BLD AUTO: 219 10*3/MM3 (ref 140–450)
POTASSIUM SERPL-SCNC: 4.6 MMOL/L (ref 3.5–5.2)
PROT SERPL-MCNC: 7 G/DL (ref 6–8.5)
RBC # BLD AUTO: 5.11 10*6/MM3 (ref 4.14–5.8)
SODIUM SERPL-SCNC: 142 MMOL/L (ref 136–145)
TRIGL SERPL-MCNC: 122 MG/DL (ref 0–150)
VLDLC SERPL CALC-MCNC: 22 MG/DL (ref 5–40)
WBC # BLD AUTO: 7.61 10*3/MM3 (ref 3.4–10.8)

## 2024-04-22 DIAGNOSIS — E78.2 MIXED HYPERLIPIDEMIA: Primary | ICD-10-CM

## 2024-04-22 RX ORDER — ATORVASTATIN CALCIUM 10 MG/1
10 TABLET, FILM COATED ORAL NIGHTLY
Qty: 90 TABLET | Refills: 1 | Status: SHIPPED | OUTPATIENT
Start: 2024-04-22

## 2024-04-27 PROBLEM — E11.65 TYPE 2 DIABETES MELLITUS WITH HYPERGLYCEMIA, WITHOUT LONG-TERM CURRENT USE OF INSULIN: Status: ACTIVE | Noted: 2024-04-27

## 2024-04-27 PROBLEM — B37.2 CANDIDIASIS, INTERTRIGO: Status: ACTIVE | Noted: 2024-04-27

## 2024-04-27 NOTE — ASSESSMENT & PLAN NOTE
Stable on Trelegy Ellipta daily, DUO Neb twice a day and continuous Oxygen at 3lpm nasal cannula.  Followed by Dr. Londono, Pulmonary/Sleep Medicine, next appointment on 5/9/24.

## 2024-04-27 NOTE — ASSESSMENT & PLAN NOTE
Keep area clean and dry.  Apply Nystatin Power to site PRN TID.  Place wicking cloth in between lower abdomen skin folds to wick moisture.  Will continue to monitor.

## 2024-04-27 NOTE — ASSESSMENT & PLAN NOTE
Diabetes is newly identified.  A1c 6.6 today.  Recommended an ADA diet.  Regular aerobic exercise.  Discussed ways to avoid symptomatic hypoglycemia.  Discussed foot care.  Reminded to get yearly retinal exam.  Diabetes educator referral.  Nutritionist referral.  Start Metformin 500mg daily with breakfast.  Discussed SE of medication.  Diabetes will be re-assessed in 2 months.

## 2024-04-27 NOTE — ASSESSMENT & PLAN NOTE
Hypertension is controlled on Lisinopril 10mg daily.  Continue current treatment plan.  Decrease table salt and foods high in salt.  Weight loss.  Increase activity daily.  Blood pressure will be re-assessed in 2 months.

## 2024-04-27 NOTE — ASSESSMENT & PLAN NOTE
"Improved; WBC 7.95 on lab from 3/29/24 and afebrile, and patient stated breathing fine today, get winded easily\".  Lab:  CBC  Will continue to monitor.    "

## 2024-04-27 NOTE — ASSESSMENT & PLAN NOTE
"Stable on \"Ventilator Machine\" with 3 liters supplemental oxygen, tolerating well.  He has follow-up appointment with Dr. Londono, Pulmonary/Sleep Medicine, located at [address] [place] [address], [address], [place] [id number], [phone], on 5/9/24.  Provided patient with Dr. Londono's contact information as indicated above.    "

## 2024-04-27 NOTE — ASSESSMENT & PLAN NOTE
"Stable, reports \"breathing fine today, get winded easily\".  Currently on Trelegy Ellipta daily, DUO Neb twice a day and continuous Oxygen at 3lpm via nasal cannula.  Continue current treatment plan.  Followed by Dr. Londono, Pulmonary/Sleep Medicine, next appointment on 5/9/24.    "

## 2024-04-27 NOTE — ASSESSMENT & PLAN NOTE
Patient's (Body mass index is 53.58 kg/m².) indicates that they are morbidly/severely obese (BMI > 40 or > 35 with obesity - related health condition) with health conditions that include obstructive sleep apnea, hypertension, diabetes mellitus, and COPD and CHF . Weight is newly identified. BMI  is above average; BMI management plan is completed. We discussed low calorie, low carb based diet program, portion control, and increasing exercise.   Will continue to monitor.

## 2024-04-27 NOTE — ASSESSMENT & PLAN NOTE
Stable on Torsemide 20mg daily.  Encouraged patient to monitor weight daily and fluid intake and to stay within recommended fluid restriction level.  Followed by Dr. Hermann Torres Jr., Cardiology.

## 2024-04-27 NOTE — ASSESSMENT & PLAN NOTE
Stable on Trelegy Ellipta daily, DUO Neb twice a day and continuous Oxygen at 3lpm per nasal cannula.  Continue current treatment plan.  Followed by Dr. Londono, Pulmonary/Sleep Medicine, next appointment on 5/9/24.  Discussed with patient that he is to follow-up on 5/9/24 with Dr. Londono, Pulmonary/Sleep Medicine, located at [address] [place] [address], [address], [place] [id number], [phone] and he agreed.

## 2024-04-29 NOTE — PROGRESS NOTES
"Chief Complaint  elevated blood pressure reading  (Establish care as new patient )    Subjective        HPI   Josh presents to [facility] to establish care and to follow-up on recent [facility] hospital visit from 3/26/24 to 3/31/24 for Shortness of Breath:    Hospital Course:    Mr. Hinojosa is a 48 y.o. male with a history of COPD and CHF who presented to [facility] initially complaining of pleuritic chest pain and shortness of breath.  Please see the admitting history and physical for further details.  He was found to have bilateral lower lobe infiltrates and severe hypoxia and was admitted to the hospital for further evaluation and treatment.  He had recently been in the hospital and treated for human metapneumovirus.  He was seen by pulmonology as well as cardiology.  He was given a course of antibiotic for presumed bacterial pneumonia.'s also treated with steroids and Lasix.  He is gradually weaned down to 3 L of oxygen and has been cleared for discharge by pulmonology and cardiology.  He will follow-up with pulmonology in 6 weeks and will need repeat CT scan chest.  He was counseled on the need to stop smoking.  Will send home on tapering prednisone.  He is completed antibiotics.     Is also felt he had decompensated heart failure and did respond to IV Lasix.  He will follow-up cardiology as outpatient.  Repeat echo here showed only mild tricuspid regurgitation with RVSP 17 mmHg.  Initially they were considering right heart catheterization but decided against to this.  He will follow-up with his normal cardiologist.     Copied text on this note has been reviewed and revised by me on 4/17/24.    Bacterial Pneumonia/Acute Respiratory Failure with Hypoxia/Chronic respiratory failure with hypercapnia/COPD - He reports breathing better since hospitalization discharge.  He is \"breathing fine today\", but he gets winded easily.  He has been using DUO Neb twice a day.  " "He is also on continuous Oxygen at 3lpm per nasal cannula.   Discussed with patient that he is to follow-up on 5/9/24 with Dr. Londono, Pulmonary/Sleep Medicine, located at [address] [place] [address], [address], [place] [address], [phone] and he agreed.    SHARMILA - he uses \"ventilator machine\" with 3 liters of supplemental oxygen every night.  He is tolerating machine well at night.  He quit smoking the day he was admitted to the hospitalization.  He admits still smoking marijuana, having a couple joints per day.    Skin rash in lower abdominal skin fold - he reports rash coming and going.  He requested refill of Nystatin powder medication.    BMI 53.58 - He has cut out table salt. He has been eating more ground turkey, frozen plain vegetables, grilling and using air fryer instead of frying foods with grease.    Review of Systems   Constitutional:  Negative for chills and fever.   Respiratory:  Positive for shortness of breath. Negative for cough and wheezing.         Breathing fine today, gets winded easily.   Cardiovascular:  Negative for chest pain, palpitations and leg swelling.     Objective   Vital Signs:   Vitals:    04/17/24 0831 04/17/24 0853   BP: 144/98 128/86   BP Location: Right arm Right arm   Patient Position: Sitting Sitting   Cuff Size: Adult Adult   Pulse:  62   Temp:  98.6 °F (37 °C)   TempSrc:  Oral   SpO2:  93%  Comment: while on continuous 0xygen at 3lpm per nasal cannula   Weight: (!) 165 kg (363 lb)    Height: 175.3 cm (69.02\")             4/17/2024     8:34 AM   PHQ-2/PHQ-9 Depression Screening   Little Interest or Pleasure in Doing Things 0-->not at all   Feeling Down, Depressed or Hopeless 0-->not at all   PHQ-9: Brief Depression Severity Measure Score 0     Physical Exam  Vitals and nursing note reviewed.   Constitutional:       General: He is not in acute distress.     Appearance: Normal appearance. He is not toxic-appearing or diaphoretic.      Comments: Severe obesity   HENT:      Head: " Normocephalic and atraumatic.   Eyes:      Conjunctiva/sclera: Conjunctivae normal.   Cardiovascular:      Rate and Rhythm: Normal rate and regular rhythm.      Heart sounds: Normal heart sounds. No murmur heard.  Pulmonary:      Effort: Pulmonary effort is normal. No respiratory distress.      Breath sounds: No wheezing or rhonchi.      Comments: Diminished, distant breath sounds.  Skin:     General: Skin is warm and dry.      Findings: Rash present.      Comments: Intertrigo Candidiasis of lower abdomen.   Neurological:      Mental Status: He is alert.   Psychiatric:         Mood and Affect: Mood normal.        Physical Exam     Result Review :     The following data was reviewed by: DORIS Morel on 04/17/2024:      Basic Metabolic Panel (03/29/2024 04:37)  CBC (No Diff) (03/29/2024 04:37)  Basic Metabolic Panel (04/08/2024 12:00)     Assessment and Plan    Assessment & Plan  Primary hypertension  Hypertension is controlled on Lisinopril 10mg daily.  Continue current treatment plan.  Decrease table salt and foods high in salt.  Weight loss.  Increase activity daily.  Blood pressure will be re-assessed in 2 months.    Type 2 diabetes mellitus with hyperglycemia, without long-term current use of insulin  Diabetes is newly identified.  A1c 6.6 today.  Recommended an ADA diet.  Regular aerobic exercise.  Discussed ways to avoid symptomatic hypoglycemia.  Discussed foot care.  Reminded to get yearly retinal exam.  Diabetes educator referral.  Nutritionist referral.  Start Metformin 500mg daily with breakfast.  Discussed SE of medication.  Diabetes will be re-assessed  in 2 months .    Chronic obstructive pulmonary disease, unspecified COPD type  Stable on Trelegy Ellipta daily, DUO Neb twice a day and continuous Oxygen at 3lpm per nasal cannula.  Continue current treatment plan.  Followed by Dr. Londono, Pulmonary/Sleep Medicine, next appointment on 5/9/24.  Discussed with patient that he is to follow-up on  "5/9/24 with Dr. Londono, Pulmonary/Sleep Medicine, located at [address] [facility], [address], [place] [address], [phone] and he agreed.    Acute on chronic diastolic congestive heart failure  Stable on Torsemide 20mg daily.  Encouraged patient to monitor weight daily and fluid intake and to stay within recommended fluid restriction level.  Followed by Dr. Hermann Torres Jr., Cardiology.    Bacterial pneumonia  Improved; WBC 7.95 on lab from 3/29/24 and afebrile, and patient stated breathing fine today, get winded easily\".  Lab:  CBC  Will continue to monitor.    Chronic respiratory failure with hypercapnia  Stable, reports \"breathing fine today, get winded easily\".  Currently on Trelegy Ellipta daily, DUO Neb twice a day and continuous Oxygen at 3lpm via nasal cannula.  Continue current treatment plan.  Followed by Dr. Londono, Pulmonary/Sleep Medicine, next appointment on 5/9/24.    Acute respiratory failure with hypoxia  Stable on Trelegy Ellipta daily, DUO Neb twice a day and continuous Oxygen at 3lpm nasal cannula.  Followed by Dr. Londono, Pulmonary/Sleep Medicine, next appointment on 5/9/24.    Obstructive sleep apnea  Stable on \"Ventilator Machine\" with 3 liters supplemental oxygen, tolerating well.  He has follow-up appointment with Dr. Londono, Pulmonary/Sleep Medicine, located at [address] [facility], [address], [place] [address], [phone], on 5/9/24.  Provided patient with Dr. Londono's contact information as indicated above.    Candidiasis, intertrigo  Keep area clean and dry.  Apply Nystatin Power to site PRN TID.  Place wicking cloth in between lower abdomen skin folds to wick moisture.  Will continue to monitor.    Screening cholesterol level    Elevated glucose level    Morbid obesity with BMI of 50.0-59.9, adult  Patient's (Body mass index is 53.58 kg/m².) indicates that they are morbidly/severely obese (BMI > 40 or > 35 with obesity - related health condition) with health conditions that include " obstructive sleep apnea, hypertension, diabetes mellitus, and COPD and CHF  . Weight is newly identified. BMI  is above average; BMI management plan is completed. We discussed low calorie, low carb based diet program, portion control, and increasing exercise.   Will continue to monitor.    Marijuana smoker, continuous  Smokes marijuana, 2 joints per day.  Encourage to stop smoking.  Will continue to monitor.    Cigarette nicotine dependence in remission  Patient reports topped smoking since hospital visit on 3/26/24.  Will continue to monitor.                                          Orders Placed This Encounter   Procedures    Comprehensive Metabolic Panel    Lipid Panel    Ambulatory Referral to Diabetic Education    POC Glycosylated Hemoglobin (Hb A1C)    CBC & Differential     New Medications Ordered This Visit   Medications    nystatin (MYCOSTATIN) 834698 UNIT/GM powder     Sig: Apply  topically to the appropriate area as directed 3 (Three) Times a Day.     Dispense:  60 g     Refill:  1    metFORMIN (GLUCOPHAGE) 500 MG tablet     Sig: Take 1 tablet by mouth Daily With Breakfast.     Dispense:  90 tablet     Refill:  1          Follow Up   Return in about 2 months (around 6/17/2024) for Next scheduled follow up Chronic Care.  Patient was given instructions and counseling regarding his condition or for health maintenance advice. Please see specific information pulled into the AVS if appropriate.

## 2024-05-02 VITALS
DIASTOLIC BLOOD PRESSURE: 86 MMHG | HEIGHT: 69 IN | BODY MASS INDEX: 46.65 KG/M2 | WEIGHT: 315 LBS | HEART RATE: 62 BPM | SYSTOLIC BLOOD PRESSURE: 128 MMHG | TEMPERATURE: 98.6 F | OXYGEN SATURATION: 93 %

## 2024-05-02 PROBLEM — F12.90 MARIJUANA SMOKER, CONTINUOUS: Status: ACTIVE | Noted: 2024-05-02

## 2024-05-02 PROBLEM — F17.211 CIGARETTE NICOTINE DEPENDENCE IN REMISSION: Status: ACTIVE | Noted: 2024-05-02

## 2024-05-02 NOTE — ASSESSMENT & PLAN NOTE
Patient reports topped smoking since hospital visit on 3/26/24.  Will continue to monitor.

## 2024-06-04 ENCOUNTER — TELEPHONE (OUTPATIENT)
Dept: FAMILY MEDICINE CLINIC | Facility: CLINIC | Age: 49
End: 2024-06-04

## 2024-06-04 DIAGNOSIS — E11.65 TYPE 2 DIABETES MELLITUS WITH HYPERGLYCEMIA, WITHOUT LONG-TERM CURRENT USE OF INSULIN: Primary | ICD-10-CM

## 2024-06-04 NOTE — TELEPHONE ENCOUNTER
PATIENT'S NIECE PILI (NOT ON [facility]) CALLED REQUESTING A  GLUCOSE MONITOR. HE WAS NEWLY DIAGNOSED WITH DIABETES    CALL BACK NUMBER [phone]    [id number] - [place], [place] - [address] AT [facility] - [phone] [facility] [phone]  [phone]

## 2024-06-05 RX ORDER — BLOOD-GLUCOSE METER
EACH MISCELLANEOUS
Qty: 1 KIT | Refills: 0 | Status: SHIPPED | OUTPATIENT
Start: 2024-06-05 | End: 2024-06-17 | Stop reason: SDUPTHER

## 2024-06-05 RX ORDER — BLOOD SUGAR DIAGNOSTIC
STRIP MISCELLANEOUS
Qty: 100 EACH | Refills: 1 | Status: SHIPPED | OUTPATIENT
Start: 2024-06-05

## 2024-06-05 RX ORDER — LANCETS 23 GAUGE
1 EACH MISCELLANEOUS
Qty: 200 EACH | Refills: 0 | Status: SHIPPED | OUTPATIENT
Start: 2024-06-05

## 2024-06-05 RX ORDER — GLUCOSAMINE HCL/CHONDROITIN SU 500-400 MG
CAPSULE ORAL
Qty: 100 EACH | Refills: 1 | Status: SHIPPED | OUTPATIENT
Start: 2024-06-05

## 2024-06-05 NOTE — TELEPHONE ENCOUNTER
Called patient in regards to diabetic supplies. Left message as patient wasn't available just stating diabetic supplies have been sent to pharmacy and if there is any other questions or concerns give us a call at the office

## 2024-06-05 NOTE — TELEPHONE ENCOUNTER
Please call patient and advise him that Glucometer and supplies went to his [facility]'s pharmacy.  Thank you.

## 2024-06-17 ENCOUNTER — OFFICE VISIT (OUTPATIENT)
Dept: FAMILY MEDICINE CLINIC | Facility: CLINIC | Age: 49
End: 2024-06-17
Payer: COMMERCIAL

## 2024-06-17 VITALS
DIASTOLIC BLOOD PRESSURE: 78 MMHG | HEART RATE: 70 BPM | OXYGEN SATURATION: 95 % | BODY MASS INDEX: 46.65 KG/M2 | HEIGHT: 69 IN | WEIGHT: 315 LBS | SYSTOLIC BLOOD PRESSURE: 128 MMHG

## 2024-06-17 DIAGNOSIS — R60.0 BILATERAL LOWER EXTREMITY EDEMA: ICD-10-CM

## 2024-06-17 DIAGNOSIS — I10 PRIMARY HYPERTENSION: Primary | Chronic | ICD-10-CM

## 2024-06-17 DIAGNOSIS — I50.33 ACUTE ON CHRONIC DIASTOLIC CONGESTIVE HEART FAILURE: ICD-10-CM

## 2024-06-17 DIAGNOSIS — J96.01 ACUTE RESPIRATORY FAILURE WITH HYPOXIA: Chronic | ICD-10-CM

## 2024-06-17 DIAGNOSIS — E78.00 PURE HYPERCHOLESTEROLEMIA: Chronic | ICD-10-CM

## 2024-06-17 DIAGNOSIS — I51.7 MILD CONCENTRIC LEFT VENTRICULAR HYPERTROPHY: ICD-10-CM

## 2024-06-17 DIAGNOSIS — E11.65 TYPE 2 DIABETES MELLITUS WITH HYPERGLYCEMIA, WITHOUT LONG-TERM CURRENT USE OF INSULIN: Chronic | ICD-10-CM

## 2024-06-17 DIAGNOSIS — J44.9 CHRONIC OBSTRUCTIVE PULMONARY DISEASE, UNSPECIFIED COPD TYPE: Chronic | ICD-10-CM

## 2024-06-17 LAB
EXPIRATION DATE: ABNORMAL
HBA1C MFR BLD: 6 % (ref 4.5–5.7)
Lab: ABNORMAL

## 2024-06-17 PROCEDURE — 83036 HEMOGLOBIN GLYCOSYLATED A1C: CPT | Performed by: NURSE PRACTITIONER

## 2024-06-17 PROCEDURE — 3078F DIAST BP <80 MM HG: CPT | Performed by: NURSE PRACTITIONER

## 2024-06-17 PROCEDURE — 3074F SYST BP LT 130 MM HG: CPT | Performed by: NURSE PRACTITIONER

## 2024-06-17 PROCEDURE — 99214 OFFICE O/P EST MOD 30 MIN: CPT | Performed by: NURSE PRACTITIONER

## 2024-06-17 PROCEDURE — 3044F HG A1C LEVEL LT 7.0%: CPT | Performed by: NURSE PRACTITIONER

## 2024-06-17 RX ORDER — FLUTICASONE FUROATE, UMECLIDINIUM BROMIDE AND VILANTEROL TRIFENATATE 200; 62.5; 25 UG/1; UG/1; UG/1
1 POWDER RESPIRATORY (INHALATION)
Qty: 60 EACH | Refills: 3 | Status: SHIPPED | OUTPATIENT
Start: 2024-06-17

## 2024-06-17 RX ORDER — BLOOD-GLUCOSE METER
EACH MISCELLANEOUS
Qty: 1 KIT | Refills: 0 | Status: SHIPPED | OUTPATIENT
Start: 2024-06-17

## 2024-06-17 RX ORDER — LISINOPRIL 10 MG/1
10 TABLET ORAL
Qty: 90 TABLET | Refills: 1 | Status: SHIPPED | OUTPATIENT
Start: 2024-06-17

## 2024-06-17 RX ORDER — ATORVASTATIN CALCIUM 10 MG/1
10 TABLET, FILM COATED ORAL NIGHTLY
Qty: 90 TABLET | Refills: 1 | Status: SHIPPED | OUTPATIENT
Start: 2024-06-17

## 2024-06-17 NOTE — ASSESSMENT & PLAN NOTE
Hypertension is controlled.  Decrease table salt and foods high in salt.  Discussed importance of weight loss.  Increase activity daily.  Continue Lisinopril 10mg daily and Torsemide 20mg daily.  Blood pressure will be re-assessed in 3 months.

## 2024-06-17 NOTE — ASSESSMENT & PLAN NOTE
Controlled on Duo-Neb daily due being out of Trelegy Ellipta for a couple weeks.  Continue Trelegy Ellipta daily every day; use Duo-Neb PRN for SOB and wheezing.  Advised patient will submit another referral to Dr. Lang and highly recommend patient follow closely with Dr. Lang, Pulmonology and he agreed.

## 2024-06-17 NOTE — ASSESSMENT & PLAN NOTE
Lipid abnormalities are controlled on Atorvastatin 10mg nightly.  Encouraged lifestyle changes.  Continue current treatment plan.  Will re-assess lipids today.

## 2024-06-17 NOTE — PROGRESS NOTES
"Chief Complaint  Hypertension (2 month follow up )    Subjective        HPI   Josh presents to [facility] for Hypertension and Diabetes:    Hypertension - he is taking Lisinopril 10mg daily.  He denies any complaints.    Acute on Chronic Diastolic HF - currently on Torsemide 20mg daily.  ECHO shows LVEF = 61-65%; Mild Concentric Hypertrophy.    Hyperlipidemia - he has not been taking Atorvastatin because he said pharmacy did not give it to him.  Advised patient that I will send Atorvastatin again to the pharmacy today.    Diabetes - his last A1c was 6.6 on 4/17/24.  He is taking Metformin 500mg daily with breakfast.  He denies SE of medication.  He has not been checking his blood sugar because pharmacy only gave him supplies and did not give him a glucometer.   Advised patient that I will re-send glucometer to his pharmacy today.    COPD/Acute Respiratory Failure with Hypoxia - he has been out of [facility] for a couple weeks.  He has been using Duo-Neb \"maybe once a day\" and is also on continuous Oxygen at 2lpm via nasal cannula.    He reports when he moves around he is having SOB.  He denies SOB sitting in office today.  He reports feeling fine until he gets up and starts to walk around, then he feels SOB.  Patient stated saw Dr. Dougie Lang twice since hospital discharge but I do not see Office Notes from Dr. Lang since last hospital discharge on 3/31/24.  Advised patient that I do not see notes from Dr. Lang and that referral from 3/13/24 is still pending.  Recommend referral to continue seeing Dr. Lang, Pulmonologist and he agreed.          Objective   Vital Signs:   Vitals:    06/17/24 0915   BP: 128/78   BP Location: Left arm   Patient Position: Sitting   Cuff Size: Large Adult   Pulse: 70   SpO2: 95%   Weight: (!) 166 kg (366 lb)   Height: 175.3 cm (69.02\")            4/17/2024     8:34 AM   PHQ-2/PHQ-9 Depression Screening   Little Interest or Pleasure in Doing Things " 0-->not at all   Feeling Down, Depressed or Hopeless 0-->not at all   PHQ-9: Brief Depression Severity Measure Score 0        Physical Exam  Vitals and nursing note reviewed.   Constitutional:       General: He is not in acute distress.     Appearance: He is morbidly obese.      Comments: Chronically ill appearing   HENT:      Head: Normocephalic and atraumatic.   Cardiovascular:      Rate and Rhythm: Normal rate and regular rhythm.      Heart sounds: Normal heart sounds.      Comments: Distant heart sounds  Pulmonary:      Effort: Pulmonary effort is normal. No respiratory distress.      Breath sounds: No wheezing or rhonchi.      Comments: Decreased breath sounds throughout bilateral lobes.  Musculoskeletal:      Right lower leg: Edema present.      Left lower leg: Edema present.      Comments: BLE trace pitting edema.   Skin:     General: Skin is warm.      Findings: No erythema.   Neurological:      Mental Status: He is alert.          Result Review :     The following data was reviewed by: DORIS Morel on 06/17/2024:      CBC & Differential (04/17/2024 09:38)  Comprehensive Metabolic Panel (04/17/2024 09:38)  Lipid Panel (04/17/2024 09:38)  POC Glycosylated Hemoglobin (Hb A1C) (04/17/2024 11:36)     Assessment and Plan    Assessment & Plan  Primary hypertension  Hypertension is controlled.  Decrease table salt and foods high in salt.  Discussed importance of weight loss.  Increase activity daily.  Continue Lisinopril 10mg daily and Torsemide 20mg daily.  Blood pressure will be re-assessed in 3 months.    Pure hypercholesterolemia  Lipid abnormalities are controlled on Atorvastatin 10mg nightly.  Encouraged lifestyle changes.  Continue current treatment plan.  Will re-assess lipids today.    Type 2 diabetes mellitus with hyperglycemia, without long-term current use of insulin  Diabetes is stable. A1c '6.0 today.  Recommended an ADA diet.  Discussed ways to avoid symptomatic hypoglycemia.  Discussed  foot care.  Reminded to get yearly retinal exam.  Referred to DM Educator last visit in 4/2024.  Continue Metformin 500mg daily with food.  Diabetes will be re-assessed in 3 months.    Chronic obstructive pulmonary disease, unspecified COPD type  Controlled on Duo-Neb daily due being out of Trelegy Ellipta for a couple weeks.  Continue Trelegy Ellipta daily every day; use Duo-Neb PRN for SOB and wheezing.  Advised patient will submit another referral to Dr. Lang and highly recommend patient follow closely with Dr. Lang, Pulmonology and he agreed.    Acute respiratory failure with hypoxia  Stable on continuous Oxygen at 2lpm nasal cannula.  Advised patient to follow closely with Dr. Lang.    Acute on chronic diastolic congestive heart failure  ECHO shows LVEF = 61-65%; Mild Concentric Hypertrophy.  Currently on Torsemide 20mg daily.  Continue current treatment plan.  Followed by Cardiology.    Mild concentric left ventricular hypertrophy    Bilateral lower extremity edema  BLE trace pitting edema.  Decrease table salt and foods high in salt.  Increase activity daily.  Elevate BLE when sitting for extended timeframe.  Will continue to monitor.      Orders Placed This Encounter   Procedures    Lipid Panel    Comprehensive Metabolic Panel    Ambulatory Referral to Pulmonology    POC Glycosylated Hemoglobin (Hb A1C)     New Medications Ordered This Visit   Medications    atorvastatin (LIPITOR) 10 MG tablet     Sig: Take 1 tablet by mouth Every Night.     Dispense:  90 tablet     Refill:  1    Fluticasone-Umeclidin-Vilant (Trelegy Ellipta) 200-62.5-25 MCG/ACT inhaler     Sig: Inhale 1 puff Daily.     Dispense:  60 each     Refill:  3    lisinopril (PRINIVIL,ZESTRIL) 10 MG tablet     Sig: Take 1 tablet by mouth Daily.     Dispense:  90 tablet     Refill:  1    Blood Glucose Monitoring Suppl (Accu-Chek Nell Plus) w/Device kit     Sig: Test daily before breakfast.  E11.65.     Dispense:  1 kit     Refill:  0     May  substitute for insurance formulary.          Follow Up   Return in about 3 months (around 9/17/2024) for Next scheduled follow  - HTN, HLD, DM.  Patient was given instructions and counseling regarding his condition or for health maintenance advice. Please see specific information pulled into the AVS if appropriate.

## 2024-06-17 NOTE — ASSESSMENT & PLAN NOTE
Diabetes is stable. A1c '6.0 today.  Recommended an ADA diet.  Discussed ways to avoid symptomatic hypoglycemia.  Discussed foot care.  Reminded to get yearly retinal exam.  Referred to DM Educator last visit in 4/2024.  Continue Metformin 500mg daily with food.  Diabetes will be re-assessed in 3 months.

## 2024-06-18 LAB
ALBUMIN SERPL-MCNC: 4.5 G/DL (ref 4.1–5.1)
ALP SERPL-CCNC: 66 IU/L (ref 44–121)
ALT SERPL-CCNC: 20 IU/L (ref 0–44)
AST SERPL-CCNC: 13 IU/L (ref 0–40)
BILIRUB SERPL-MCNC: 0.3 MG/DL (ref 0–1.2)
BUN SERPL-MCNC: 17 MG/DL (ref 6–24)
BUN/CREAT SERPL: 17 (ref 9–20)
CALCIUM SERPL-MCNC: 9.7 MG/DL (ref 8.7–10.2)
CHLORIDE SERPL-SCNC: 98 MMOL/L (ref 96–106)
CHOLEST SERPL-MCNC: 137 MG/DL (ref 100–199)
CO2 SERPL-SCNC: 30 MMOL/L (ref 20–29)
CREAT SERPL-MCNC: 0.98 MG/DL (ref 0.76–1.27)
EGFRCR SERPLBLD CKD-EPI 2021: 95 ML/MIN/1.73
GLOBULIN SER CALC-MCNC: 2.2 G/DL (ref 1.5–4.5)
GLUCOSE SERPL-MCNC: 106 MG/DL (ref 70–99)
HDLC SERPL-MCNC: 32 MG/DL
LDLC SERPL CALC-MCNC: 84 MG/DL (ref 0–99)
POTASSIUM SERPL-SCNC: 4.4 MMOL/L (ref 3.5–5.2)
PROT SERPL-MCNC: 6.7 G/DL (ref 6–8.5)
SODIUM SERPL-SCNC: 142 MMOL/L (ref 134–144)
TRIGL SERPL-MCNC: 117 MG/DL (ref 0–149)
VLDLC SERPL CALC-MCNC: 21 MG/DL (ref 5–40)

## 2024-06-23 PROBLEM — I51.7 MILD CONCENTRIC LEFT VENTRICULAR HYPERTROPHY: Status: ACTIVE | Noted: 2024-06-23

## 2024-06-23 NOTE — ASSESSMENT & PLAN NOTE
ECHO shows LVEF = 61-65%; Mild Concentric Hypertrophy.  Currently on Torsemide 20mg daily.  Continue current treatment plan.  Followed by Cardiology.

## 2024-06-24 ENCOUNTER — TELEPHONE (OUTPATIENT)
Dept: FAMILY MEDICINE CLINIC | Facility: CLINIC | Age: 49
End: 2024-06-24
Payer: COMMERCIAL

## 2024-06-24 NOTE — ASSESSMENT & PLAN NOTE
Stable on continuous Oxygen at 2lpm nasal cannula.  Advised patient to follow closely with Dr. Lang.

## 2024-08-14 DIAGNOSIS — B37.2 CANDIDIASIS, INTERTRIGO: ICD-10-CM

## 2024-08-14 RX ORDER — NYSTATIN 100000 [USP'U]/G
POWDER TOPICAL
Qty: 60 G | Refills: 1 | Status: SHIPPED | OUTPATIENT
Start: 2024-08-14

## 2024-09-17 ENCOUNTER — OFFICE VISIT (OUTPATIENT)
Dept: FAMILY MEDICINE CLINIC | Facility: CLINIC | Age: 49
End: 2024-09-17
Payer: COMMERCIAL

## 2024-09-17 VITALS
OXYGEN SATURATION: 92 % | HEART RATE: 84 BPM | BODY MASS INDEX: 46.65 KG/M2 | HEIGHT: 69 IN | WEIGHT: 315 LBS | DIASTOLIC BLOOD PRESSURE: 78 MMHG | SYSTOLIC BLOOD PRESSURE: 122 MMHG

## 2024-09-17 DIAGNOSIS — E78.00 PURE HYPERCHOLESTEROLEMIA: Chronic | ICD-10-CM

## 2024-09-17 DIAGNOSIS — I51.7 MILD CONCENTRIC LEFT VENTRICULAR HYPERTROPHY: Chronic | ICD-10-CM

## 2024-09-17 DIAGNOSIS — J44.9 CHRONIC OBSTRUCTIVE PULMONARY DISEASE, UNSPECIFIED COPD TYPE: Chronic | ICD-10-CM

## 2024-09-17 DIAGNOSIS — I10 PRIMARY HYPERTENSION: Primary | Chronic | ICD-10-CM

## 2024-09-17 DIAGNOSIS — E66.01 MORBID OBESITY WITH BMI OF 50.0-59.9, ADULT: Chronic | ICD-10-CM

## 2024-09-17 DIAGNOSIS — E11.65 TYPE 2 DIABETES MELLITUS WITH HYPERGLYCEMIA, WITHOUT LONG-TERM CURRENT USE OF INSULIN: Chronic | ICD-10-CM

## 2024-09-17 DIAGNOSIS — F17.210 CIGARETTE NICOTINE DEPENDENCE WITHOUT COMPLICATION: ICD-10-CM

## 2024-09-17 PROCEDURE — 1125F AMNT PAIN NOTED PAIN PRSNT: CPT | Performed by: NURSE PRACTITIONER

## 2024-09-17 PROCEDURE — 3074F SYST BP LT 130 MM HG: CPT | Performed by: NURSE PRACTITIONER

## 2024-09-17 PROCEDURE — 1159F MED LIST DOCD IN RCRD: CPT | Performed by: NURSE PRACTITIONER

## 2024-09-17 PROCEDURE — 3078F DIAST BP <80 MM HG: CPT | Performed by: NURSE PRACTITIONER

## 2024-09-17 PROCEDURE — 99214 OFFICE O/P EST MOD 30 MIN: CPT | Performed by: NURSE PRACTITIONER

## 2024-09-17 PROCEDURE — 3044F HG A1C LEVEL LT 7.0%: CPT | Performed by: NURSE PRACTITIONER

## 2024-09-17 PROCEDURE — 1160F RVW MEDS BY RX/DR IN RCRD: CPT | Performed by: NURSE PRACTITIONER

## 2024-09-18 LAB
ALBUMIN SERPL-MCNC: 4.5 G/DL (ref 3.5–5.2)
ALBUMIN/CREAT UR: <19 MG/G CREAT (ref 0–29)
ALBUMIN/GLOB SERPL: 2 G/DL
ALP SERPL-CCNC: 67 U/L (ref 39–117)
ALT SERPL-CCNC: 18 U/L (ref 1–41)
AST SERPL-CCNC: 15 U/L (ref 1–40)
BASOPHILS # BLD AUTO: 0.08 10*3/MM3 (ref 0–0.2)
BASOPHILS NFR BLD AUTO: 1 % (ref 0–1.5)
BILIRUB SERPL-MCNC: 0.3 MG/DL (ref 0–1.2)
BUN SERPL-MCNC: 16 MG/DL (ref 6–20)
BUN/CREAT SERPL: 15.2 (ref 7–25)
CALCIUM SERPL-MCNC: 9.6 MG/DL (ref 8.6–10.5)
CHLORIDE SERPL-SCNC: 98 MMOL/L (ref 98–107)
CHOLEST SERPL-MCNC: 116 MG/DL (ref 0–200)
CO2 SERPL-SCNC: 34.7 MMOL/L (ref 22–29)
CREAT SERPL-MCNC: 1.05 MG/DL (ref 0.76–1.27)
CREAT UR-MCNC: 16 MG/DL
EGFRCR SERPLBLD CKD-EPI 2021: 87.6 ML/MIN/1.73
EOSINOPHIL # BLD AUTO: 0.22 10*3/MM3 (ref 0–0.4)
EOSINOPHIL NFR BLD AUTO: 2.7 % (ref 0.3–6.2)
ERYTHROCYTE [DISTWIDTH] IN BLOOD BY AUTOMATED COUNT: 12.2 % (ref 12.3–15.4)
GLOBULIN SER CALC-MCNC: 2.3 GM/DL
GLUCOSE SERPL-MCNC: 111 MG/DL (ref 65–99)
HBA1C MFR BLD: 6.3 % (ref 4.8–5.6)
HCT VFR BLD AUTO: 48.6 % (ref 37.5–51)
HDLC SERPL-MCNC: 31 MG/DL (ref 40–60)
HGB BLD-MCNC: 16.4 G/DL (ref 13–17.7)
IMM GRANULOCYTES # BLD AUTO: 0.03 10*3/MM3 (ref 0–0.05)
IMM GRANULOCYTES NFR BLD AUTO: 0.4 % (ref 0–0.5)
LDLC SERPL CALC-MCNC: 63 MG/DL (ref 0–100)
LYMPHOCYTES # BLD AUTO: 2.02 10*3/MM3 (ref 0.7–3.1)
LYMPHOCYTES NFR BLD AUTO: 25.1 % (ref 19.6–45.3)
MCH RBC QN AUTO: 32 PG (ref 26.6–33)
MCHC RBC AUTO-ENTMCNC: 33.7 G/DL (ref 31.5–35.7)
MCV RBC AUTO: 94.9 FL (ref 79–97)
MICROALBUMIN UR-MCNC: <3 UG/ML
MONOCYTES # BLD AUTO: 0.58 10*3/MM3 (ref 0.1–0.9)
MONOCYTES NFR BLD AUTO: 7.2 % (ref 5–12)
NEUTROPHILS # BLD AUTO: 5.12 10*3/MM3 (ref 1.7–7)
NEUTROPHILS NFR BLD AUTO: 63.6 % (ref 42.7–76)
NRBC BLD AUTO-RTO: 0 /100 WBC (ref 0–0.2)
PLATELET # BLD AUTO: 235 10*3/MM3 (ref 140–450)
POTASSIUM SERPL-SCNC: 4.8 MMOL/L (ref 3.5–5.2)
PROT SERPL-MCNC: 6.8 G/DL (ref 6–8.5)
RBC # BLD AUTO: 5.12 10*6/MM3 (ref 4.14–5.8)
SODIUM SERPL-SCNC: 138 MMOL/L (ref 136–145)
TRIGL SERPL-MCNC: 122 MG/DL (ref 0–150)
VLDLC SERPL CALC-MCNC: 22 MG/DL (ref 5–40)
WBC # BLD AUTO: 8.05 10*3/MM3 (ref 3.4–10.8)

## 2024-09-25 ENCOUNTER — TELEPHONE (OUTPATIENT)
Dept: FAMILY MEDICINE CLINIC | Facility: CLINIC | Age: 49
End: 2024-09-25
Payer: COMMERCIAL

## 2024-10-02 NOTE — PLAN OF CARE
Goal Outcome Evaluation:                      Pt remains in cicu, A/Ox4. On 6L when off of BIPAP. Pt up in the chair most of the day. Pt has HTN. Bp 169/101 dr. Lopez notified, no new orders. Family stated that pt was previously taking Bp meds but previous PCP D/C meds due to pt losing weight and not needing them, pt has regained the weight and has been having HTN but doesn't have a PCP right now. Diet ordered.                          no weight-bearing restrictions

## 2024-11-26 DIAGNOSIS — E11.65 TYPE 2 DIABETES MELLITUS WITH HYPERGLYCEMIA, WITHOUT LONG-TERM CURRENT USE OF INSULIN: ICD-10-CM

## 2024-12-17 ENCOUNTER — OFFICE VISIT (OUTPATIENT)
Dept: FAMILY MEDICINE CLINIC | Facility: CLINIC | Age: 49
End: 2024-12-17
Payer: COMMERCIAL

## 2024-12-17 VITALS
SYSTOLIC BLOOD PRESSURE: 158 MMHG | WEIGHT: 315 LBS | OXYGEN SATURATION: 91 % | HEART RATE: 74 BPM | DIASTOLIC BLOOD PRESSURE: 100 MMHG | HEIGHT: 69 IN | BODY MASS INDEX: 46.65 KG/M2

## 2024-12-17 DIAGNOSIS — J44.9 CHRONIC OBSTRUCTIVE PULMONARY DISEASE, UNSPECIFIED COPD TYPE: Chronic | ICD-10-CM

## 2024-12-17 DIAGNOSIS — E78.00 PURE HYPERCHOLESTEROLEMIA: Chronic | ICD-10-CM

## 2024-12-17 DIAGNOSIS — I10 PRIMARY HYPERTENSION: Primary | Chronic | ICD-10-CM

## 2024-12-17 DIAGNOSIS — E11.65 TYPE 2 DIABETES MELLITUS WITH HYPERGLYCEMIA, WITHOUT LONG-TERM CURRENT USE OF INSULIN: Chronic | ICD-10-CM

## 2024-12-17 DIAGNOSIS — F17.210 CIGARETTE NICOTINE DEPENDENCE WITHOUT COMPLICATION: Chronic | ICD-10-CM

## 2024-12-17 DIAGNOSIS — I50.33 ACUTE ON CHRONIC DIASTOLIC CONGESTIVE HEART FAILURE: Chronic | ICD-10-CM

## 2024-12-17 DIAGNOSIS — J96.01 ACUTE RESPIRATORY FAILURE WITH HYPOXIA: Chronic | ICD-10-CM

## 2024-12-17 LAB
BUN SERPL-MCNC: 15 MG/DL (ref 6–20)
BUN/CREAT SERPL: 14.4 (ref 7–25)
CALCIUM SERPL-MCNC: 9.6 MG/DL (ref 8.6–10.5)
CHLORIDE SERPL-SCNC: 99 MMOL/L (ref 98–107)
CO2 SERPL-SCNC: 36.5 MMOL/L (ref 22–29)
CREAT SERPL-MCNC: 1.04 MG/DL (ref 0.76–1.27)
EGFRCR SERPLBLD CKD-EPI 2021: 88.6 ML/MIN/1.73
GLUCOSE SERPL-MCNC: 96 MG/DL (ref 65–99)
POTASSIUM SERPL-SCNC: 5 MMOL/L (ref 3.5–5.2)
SODIUM SERPL-SCNC: 141 MMOL/L (ref 136–145)

## 2024-12-17 PROCEDURE — 1159F MED LIST DOCD IN RCRD: CPT | Performed by: NURSE PRACTITIONER

## 2024-12-17 PROCEDURE — 1160F RVW MEDS BY RX/DR IN RCRD: CPT | Performed by: NURSE PRACTITIONER

## 2024-12-17 PROCEDURE — 3077F SYST BP >= 140 MM HG: CPT | Performed by: NURSE PRACTITIONER

## 2024-12-17 PROCEDURE — 3080F DIAST BP >= 90 MM HG: CPT | Performed by: NURSE PRACTITIONER

## 2024-12-17 PROCEDURE — 99214 OFFICE O/P EST MOD 30 MIN: CPT | Performed by: NURSE PRACTITIONER

## 2024-12-17 PROCEDURE — 1125F AMNT PAIN NOTED PAIN PRSNT: CPT | Performed by: NURSE PRACTITIONER

## 2024-12-17 RX ORDER — ATORVASTATIN CALCIUM 10 MG/1
10 TABLET, FILM COATED ORAL NIGHTLY
Qty: 90 TABLET | Refills: 1 | Status: SHIPPED | OUTPATIENT
Start: 2024-12-17

## 2024-12-17 RX ORDER — LISINOPRIL 10 MG/1
10 TABLET ORAL
Qty: 90 TABLET | Refills: 1 | Status: SHIPPED | OUTPATIENT
Start: 2024-12-17

## 2024-12-17 RX ORDER — TORSEMIDE 20 MG/1
20 TABLET ORAL DAILY
Qty: 90 TABLET | Refills: 1 | Status: SHIPPED | OUTPATIENT
Start: 2024-12-17

## 2024-12-17 RX ORDER — FLUTICASONE FUROATE, UMECLIDINIUM BROMIDE AND VILANTEROL TRIFENATATE 200; 62.5; 25 UG/1; UG/1; UG/1
1 POWDER RESPIRATORY (INHALATION)
Qty: 60 EACH | Refills: 3 | Status: SHIPPED | OUTPATIENT
Start: 2024-12-17

## 2024-12-17 NOTE — ASSESSMENT & PLAN NOTE
Lipid abnormalities are improved on Atorvastatin 10mg nightly.  Encouraged lifestyle changes.  Continue current treatment plan.  Will continue to monitor lipids.    Orders:    atorvastatin (LIPITOR) 10 MG tablet; Take 1 tablet by mouth Every Night.

## 2024-12-17 NOTE — ASSESSMENT & PLAN NOTE
Diabetes is controlled; A1c is 6.3 on prior lab.  Follow ADA diet.  Increase activity daily.  Weight loss.  Get annual vision screening.  Continue Metformin 500mg daily with breakfast.  Will re-assess diabetes in 1 month.

## 2024-12-17 NOTE — ASSESSMENT & PLAN NOTE
Stable on 3lpm nasal cannula.  Continue current treatment plan.  He is followed by Dr. Dougie Lang.    Orders:    Fluticasone-Umeclidin-Vilant (Trelegy Ellipta) 200-62.5-25 MCG/ACT inhaler; Inhale 1 puff Daily.

## 2024-12-17 NOTE — ASSESSMENT & PLAN NOTE
Hypertension is uncontrolled due to being out of Torsemide 20mg daily.  Decrease table salt and foods high in salt.  Weight loss.  Increase activity daily.  Continue Lisinopril 10mg daily and Torsemide 20mg daily.  Blood pressure will be re-assessed in 1 month.    Orders:    torsemide (DEMADEX) 20 MG tablet; Take 1 tablet by mouth Daily.    lisinopril (PRINIVIL,ZESTRIL) 10 MG tablet; Take 1 tablet by mouth Daily.    Basic Metabolic Panel

## 2024-12-17 NOTE — PROGRESS NOTES
"Chief Complaint  Hypertension (3 month follow up ) and Med Refill    Subjective        HPI   History of Present Illness      Josh presents to [facility] for follow-up on Hypertension, Hyperlipidemia, CHF, Diabetes and COPD:     Hypertension - he is taking Lisinopril 10mg daily.  He denies any complaints.     Acute on Chronic Diastolic HF - he is currently taking Torsemide 20mg daily.  He has been out of Torsemide for 2-3 weeks.  ECHO shows LVEF = 61-65%; Mild Concentric Hypertrophy.  Followed by Dr. Torres, Cardiology.     Hyperlipidemia - he is taking Atorvastatin 10mg in the morning so he does not forget.  He denies myalgia.     Diabetes - his last A1c was 6.3 on 9/17/24.  He is taking Metformin 500mg daily with breakfast.  He denies SE to medication.  He has been checking his blood sugar 6 times per week.  His random blood glucose levels range between .  He denies having hypoglycemic episode.     COPD/Acute Respiratory Failure with Hypoxia - he is taking Trelegy daily and Duo-Neb.  He is also on continuous Oxygen at 3lpm via nasal cannula.  He denies SOB when sitting while on Oxygen at 3lpm via nasal cannula.  He has occasional SOB when walking while on Oxygen at 3lpm via nasal cannula.  He is followed by Dr. Dougie Lang, Pulmonology, next appoointment in 6 months.     Nicotine Dependence - he admits smoking only 4 cigarettes per day.  He is working on cutting back.        Objective   Vital Signs:   Vitals:    12/17/24 0929 12/17/24 1038   BP: (!) 164/110 158/100  Comment: re-checked   BP Location: Left arm Left arm   Patient Position: Sitting Sitting   Cuff Size: Large Adult Adult   Pulse: 74    SpO2: 91%  Comment: with oxygen    Weight: (!) 169 kg (373 lb 6.4 oz)    Height: 175.3 cm (69.02\")            Physical Exam  Vitals and nursing note reviewed.   Constitutional:       General: He is not in acute distress.     Appearance: Normal appearance. He is not ill-appearing. "   HENT:      Head: Normocephalic and atraumatic.   Cardiovascular:      Rate and Rhythm: Normal rate.      Heart sounds: Normal heart sounds. No murmur heard.     Comments: Distant heart sounds  Pulmonary:      Effort: Pulmonary effort is normal. No respiratory distress.      Breath sounds: Normal breath sounds. No wheezing or rales.   Neurological:      Mental Status: He is alert.   Psychiatric:         Mood and Affect: Mood normal.          Result Review :     The following data was reviewed by: DORIS Morel on 12/17/2024:      Microalbumin / Creatinine Urine Ratio - Urine, Clean Catch (09/17/2024 10:30)  CBC & Differential (09/17/2024 10:38)  Comprehensive Metabolic Panel (09/17/2024 10:38)  Lipid Panel (09/17/2024 10:38)  Hemoglobin A1c (09/17/2024 10:38)     Assessment and Plan    Assessment & Plan  Primary hypertension  Hypertension is uncontrolled due to being out of Torsemide 20mg daily.  Decrease table salt and foods high in salt.  Weight loss.  Increase activity daily.  Continue Lisinopril 10mg daily and Torsemide 20mg daily.  Blood pressure will be re-assessed in 1 month.    Orders:    torsemide (DEMADEX) 20 MG tablet; Take 1 tablet by mouth Daily.    lisinopril (PRINIVIL,ZESTRIL) 10 MG tablet; Take 1 tablet by mouth Daily.    Basic Metabolic Panel    Pure hypercholesterolemia  Lipid abnormalities are improved on Atorvastatin 10mg nightly.  Encouraged lifestyle changes.  Continue current treatment plan.  Will continue to monitor lipids.    Orders:    atorvastatin (LIPITOR) 10 MG tablet; Take 1 tablet by mouth Every Night.    Type 2 diabetes mellitus with hyperglycemia, without long-term current use of insulin  Diabetes is controlled; A1c is 6.3 on prior lab.  Follow ADA diet.  Increase activity daily.  Weight loss.  Get annual vision screening.  Continue Metformin 500mg daily with breakfast.  Will re-assess diabetes in 1 month.         Acute on chronic diastolic congestive heart failure  Stable  on Torsemide 20mg daily.  He has been out of Torsemide for 2-3 weeks.  Last ECHO showed LVEF = 61-65%; Mild Concentric Hypertrophy.  Rx;  Torsemide 20mg daily  Followed by Dr. Torres, Cardiology.    Orders:    torsemide (DEMADEX) 20 MG tablet; Take 1 tablet by mouth Daily.    Basic Metabolic Panel    Chronic obstructive pulmonary disease, unspecified COPD type  Stable on Trelegy Ellipta daily and DUO Neb Q4H PRN.  Continue current treatment plan.  Followed by Dr. Lang, Pulmonology.  Orders:    Fluticasone-Umeclidin-Vilant (Trelegy Ellipta) 200-62.5-25 MCG/ACT inhaler; Inhale 1 puff Daily.    Acute respiratory failure with hypoxia  Stable on 3lpm nasal cannula.  Continue current treatment plan.  He is followed by Dr. Dougie Lang.    Orders:    Fluticasone-Umeclidin-Vilant (Trelegy Ellipta) 200-62.5-25 MCG/ACT inhaler; Inhale 1 puff Daily.    Cigarette nicotine dependence without complication  He smokes 4 cigarettes per day.  He is working on cutting back.  Discussed importance of smoking cessation.  Will continue to monitor.            Follow Up   Return in about 1 month (around 1/17/2025) for Next scheduled follow up - uncontr HTN & DM (check A1c).  Patient was given instructions and counseling regarding his condition or for health maintenance advice. Please see specific information pulled into the AVS if appropriate.

## 2024-12-17 NOTE — ASSESSMENT & PLAN NOTE
He smokes 4 cigarettes per day.  He is working on cutting back.  Discussed importance of smoking cessation.  Will continue to monitor.

## 2024-12-17 NOTE — ASSESSMENT & PLAN NOTE
Stable on Trelegy Ellipta daily and DUO Neb Q4H PRN.  Continue current treatment plan.  Followed by Dr. Lang, Pulmonology.  Orders:    Fluticasone-Umeclidin-Vilant (Trelegy Ellipta) 200-62.5-25 MCG/ACT inhaler; Inhale 1 puff Daily.

## 2024-12-17 NOTE — ASSESSMENT & PLAN NOTE
Stable on Torsemide 20mg daily.  He has been out of Torsemide for 2-3 weeks.  Last ECHO showed LVEF = 61-65%; Mild Concentric Hypertrophy.  Rx;  Torsemide 20mg daily  Followed by Dr. Torres, Cardiology.    Orders:    torsemide (DEMADEX) 20 MG tablet; Take 1 tablet by mouth Daily.    Basic Metabolic Panel

## 2024-12-19 ENCOUNTER — TELEPHONE (OUTPATIENT)
Dept: FAMILY MEDICINE CLINIC | Facility: CLINIC | Age: 49
End: 2024-12-19
Payer: COMMERCIAL

## 2024-12-19 NOTE — TELEPHONE ENCOUNTER
Hub to relay     Your test results have some minor abnormalities that do not require any change in your treatment at this time.

## 2024-12-30 ENCOUNTER — APPOINTMENT (OUTPATIENT)
Dept: CT IMAGING | Facility: HOSPITAL | Age: 49
End: 2024-12-30
Payer: COMMERCIAL

## 2024-12-30 ENCOUNTER — APPOINTMENT (OUTPATIENT)
Dept: GENERAL RADIOLOGY | Facility: HOSPITAL | Age: 49
End: 2024-12-30
Payer: COMMERCIAL

## 2024-12-30 ENCOUNTER — HOSPITAL ENCOUNTER (INPATIENT)
Facility: HOSPITAL | Age: 49
LOS: 1 days | Discharge: LEFT BEFORE DISCHARGE | End: 2024-12-30
Attending: EMERGENCY MEDICINE | Admitting: HOSPITALIST
Payer: COMMERCIAL

## 2024-12-30 VITALS
BODY MASS INDEX: 46.65 KG/M2 | RESPIRATION RATE: 30 BRPM | HEART RATE: 81 BPM | SYSTOLIC BLOOD PRESSURE: 143 MMHG | WEIGHT: 315 LBS | TEMPERATURE: 99.2 F | OXYGEN SATURATION: 92 % | DIASTOLIC BLOOD PRESSURE: 88 MMHG | HEIGHT: 69 IN

## 2024-12-30 DIAGNOSIS — J10.1 INFLUENZA A: ICD-10-CM

## 2024-12-30 DIAGNOSIS — J18.0 BRONCHOPNEUMONIA: ICD-10-CM

## 2024-12-30 DIAGNOSIS — J96.21 ACUTE ON CHRONIC RESPIRATORY FAILURE WITH HYPOXIA: Primary | ICD-10-CM

## 2024-12-30 PROBLEM — J09.X1 INFLUENZA A WITH PNEUMONIA: Status: ACTIVE | Noted: 2024-12-30

## 2024-12-30 PROBLEM — J96.20 ACUTE ON CHRONIC RESPIRATORY FAILURE: Status: ACTIVE | Noted: 2024-12-30

## 2024-12-30 PROBLEM — I50.32 CHRONIC DIASTOLIC CHF (CONGESTIVE HEART FAILURE): Status: ACTIVE | Noted: 2024-12-30

## 2024-12-30 LAB
ALBUMIN SERPL-MCNC: 4 G/DL (ref 3.5–5.2)
ALBUMIN/GLOB SERPL: 1.6 G/DL
ALP SERPL-CCNC: 62 U/L (ref 39–117)
ALT SERPL W P-5'-P-CCNC: 15 U/L (ref 1–41)
ANION GAP SERPL CALCULATED.3IONS-SCNC: 8 MMOL/L (ref 5–15)
AST SERPL-CCNC: 19 U/L (ref 1–40)
B PARAPERT DNA SPEC QL NAA+PROBE: NOT DETECTED
B PERT DNA SPEC QL NAA+PROBE: NOT DETECTED
BASOPHILS # BLD AUTO: 0.02 10*3/MM3 (ref 0–0.2)
BASOPHILS NFR BLD AUTO: 0.5 % (ref 0–1.5)
BILIRUB SERPL-MCNC: 0.3 MG/DL (ref 0–1.2)
BUN SERPL-MCNC: 16 MG/DL (ref 6–20)
BUN/CREAT SERPL: 17.2 (ref 7–25)
C PNEUM DNA NPH QL NAA+NON-PROBE: NOT DETECTED
CALCIUM SPEC-SCNC: 8.5 MG/DL (ref 8.6–10.5)
CHLORIDE SERPL-SCNC: 99 MMOL/L (ref 98–107)
CO2 SERPL-SCNC: 33 MMOL/L (ref 22–29)
CREAT SERPL-MCNC: 0.93 MG/DL (ref 0.76–1.27)
D-LACTATE SERPL-SCNC: 0.8 MMOL/L (ref 0.5–2)
DEPRECATED RDW RBC AUTO: 46.1 FL (ref 37–54)
EGFRCR SERPLBLD CKD-EPI 2021: 101.3 ML/MIN/1.73
EOSINOPHIL # BLD AUTO: 0.09 10*3/MM3 (ref 0–0.4)
EOSINOPHIL NFR BLD AUTO: 2.1 % (ref 0.3–6.2)
ERYTHROCYTE [DISTWIDTH] IN BLOOD BY AUTOMATED COUNT: 13.4 % (ref 12.3–15.4)
FLUAV H1 2009 PAND RNA NPH QL NAA+PROBE: DETECTED
FLUBV RNA ISLT QL NAA+PROBE: NOT DETECTED
GEN 5 1HR TROPONIN T REFLEX: 18 NG/L
GLOBULIN UR ELPH-MCNC: 2.5 GM/DL
GLUCOSE SERPL-MCNC: 142 MG/DL (ref 65–99)
HADV DNA SPEC NAA+PROBE: NOT DETECTED
HCOV 229E RNA SPEC QL NAA+PROBE: NOT DETECTED
HCOV HKU1 RNA SPEC QL NAA+PROBE: NOT DETECTED
HCOV NL63 RNA SPEC QL NAA+PROBE: NOT DETECTED
HCOV OC43 RNA SPEC QL NAA+PROBE: NOT DETECTED
HCT VFR BLD AUTO: 51.8 % (ref 37.5–51)
HGB BLD-MCNC: 16.9 G/DL (ref 13–17.7)
HMPV RNA NPH QL NAA+NON-PROBE: NOT DETECTED
HPIV1 RNA ISLT QL NAA+PROBE: NOT DETECTED
HPIV2 RNA SPEC QL NAA+PROBE: NOT DETECTED
HPIV3 RNA NPH QL NAA+PROBE: NOT DETECTED
HPIV4 P GENE NPH QL NAA+PROBE: NOT DETECTED
IMM GRANULOCYTES # BLD AUTO: 0.01 10*3/MM3 (ref 0–0.05)
IMM GRANULOCYTES NFR BLD AUTO: 0.2 % (ref 0–0.5)
LYMPHOCYTES # BLD AUTO: 0.69 10*3/MM3 (ref 0.7–3.1)
LYMPHOCYTES NFR BLD AUTO: 16.4 % (ref 19.6–45.3)
M PNEUMO IGG SER IA-ACNC: NOT DETECTED
MCH RBC QN AUTO: 30.5 PG (ref 26.6–33)
MCHC RBC AUTO-ENTMCNC: 32.6 G/DL (ref 31.5–35.7)
MCV RBC AUTO: 93.3 FL (ref 79–97)
MONOCYTES # BLD AUTO: 0.5 10*3/MM3 (ref 0.1–0.9)
MONOCYTES NFR BLD AUTO: 11.9 % (ref 5–12)
NEUTROPHILS NFR BLD AUTO: 2.89 10*3/MM3 (ref 1.7–7)
NEUTROPHILS NFR BLD AUTO: 68.9 % (ref 42.7–76)
NT-PROBNP SERPL-MCNC: 332 PG/ML (ref 0–450)
PLATELET # BLD AUTO: 140 10*3/MM3 (ref 140–450)
PMV BLD AUTO: 10.2 FL (ref 6–12)
POTASSIUM SERPL-SCNC: 4.1 MMOL/L (ref 3.5–5.2)
PROCALCITONIN SERPL-MCNC: 0.09 NG/ML (ref 0–0.25)
PROT SERPL-MCNC: 6.5 G/DL (ref 6–8.5)
QT INTERVAL: 407 MS
QTC INTERVAL: 487 MS
RBC # BLD AUTO: 5.55 10*6/MM3 (ref 4.14–5.8)
RHINOVIRUS RNA SPEC NAA+PROBE: NOT DETECTED
RSV RNA NPH QL NAA+NON-PROBE: NOT DETECTED
SARS-COV-2 RNA NPH QL NAA+NON-PROBE: NOT DETECTED
SODIUM SERPL-SCNC: 140 MMOL/L (ref 136–145)
TROPONIN T NUMERIC DELTA: 1 NG/L
TROPONIN T SERPL HS-MCNC: 17 NG/L
WBC NRBC COR # BLD AUTO: 4.2 10*3/MM3 (ref 3.4–10.8)

## 2024-12-30 PROCEDURE — 94664 DEMO&/EVAL PT USE INHALER: CPT

## 2024-12-30 PROCEDURE — 25010000002 KETOROLAC TROMETHAMINE PER 15 MG: Performed by: PHYSICIAN ASSISTANT

## 2024-12-30 PROCEDURE — 83880 ASSAY OF NATRIURETIC PEPTIDE: CPT | Performed by: PHYSICIAN ASSISTANT

## 2024-12-30 PROCEDURE — 94760 N-INVAS EAR/PLS OXIMETRY 1: CPT

## 2024-12-30 PROCEDURE — 25510000001 IOPAMIDOL PER 1 ML: Performed by: EMERGENCY MEDICINE

## 2024-12-30 PROCEDURE — 84484 ASSAY OF TROPONIN QUANT: CPT | Performed by: PHYSICIAN ASSISTANT

## 2024-12-30 PROCEDURE — 71275 CT ANGIOGRAPHY CHEST: CPT

## 2024-12-30 PROCEDURE — 0202U NFCT DS 22 TRGT SARS-COV-2: CPT | Performed by: PHYSICIAN ASSISTANT

## 2024-12-30 PROCEDURE — 36415 COLL VENOUS BLD VENIPUNCTURE: CPT

## 2024-12-30 PROCEDURE — 80053 COMPREHEN METABOLIC PANEL: CPT | Performed by: PHYSICIAN ASSISTANT

## 2024-12-30 PROCEDURE — 94799 UNLISTED PULMONARY SVC/PX: CPT

## 2024-12-30 PROCEDURE — 99285 EMERGENCY DEPT VISIT HI MDM: CPT

## 2024-12-30 PROCEDURE — 84145 PROCALCITONIN (PCT): CPT | Performed by: PHYSICIAN ASSISTANT

## 2024-12-30 PROCEDURE — 93010 ELECTROCARDIOGRAM REPORT: CPT | Performed by: INTERNAL MEDICINE

## 2024-12-30 PROCEDURE — 93005 ELECTROCARDIOGRAM TRACING: CPT | Performed by: PHYSICIAN ASSISTANT

## 2024-12-30 PROCEDURE — 94640 AIRWAY INHALATION TREATMENT: CPT

## 2024-12-30 PROCEDURE — 71045 X-RAY EXAM CHEST 1 VIEW: CPT

## 2024-12-30 PROCEDURE — 85025 COMPLETE CBC W/AUTO DIFF WBC: CPT | Performed by: PHYSICIAN ASSISTANT

## 2024-12-30 PROCEDURE — 96374 THER/PROPH/DIAG INJ IV PUSH: CPT

## 2024-12-30 PROCEDURE — 87040 BLOOD CULTURE FOR BACTERIA: CPT | Performed by: PHYSICIAN ASSISTANT

## 2024-12-30 PROCEDURE — 83605 ASSAY OF LACTIC ACID: CPT | Performed by: PHYSICIAN ASSISTANT

## 2024-12-30 PROCEDURE — 94761 N-INVAS EAR/PLS OXIMETRY MLT: CPT

## 2024-12-30 RX ORDER — IPRATROPIUM BROMIDE AND ALBUTEROL SULFATE 2.5; .5 MG/3ML; MG/3ML
3 SOLUTION RESPIRATORY (INHALATION) ONCE
Status: COMPLETED | OUTPATIENT
Start: 2024-12-30 | End: 2024-12-30

## 2024-12-30 RX ORDER — SODIUM CHLORIDE 0.9 % (FLUSH) 0.9 %
10 SYRINGE (ML) INJECTION AS NEEDED
Status: DISCONTINUED | OUTPATIENT
Start: 2024-12-30 | End: 2024-12-30 | Stop reason: HOSPADM

## 2024-12-30 RX ORDER — OSELTAMIVIR PHOSPHATE 75 MG/1
75 CAPSULE ORAL 2 TIMES DAILY
Qty: 9 CAPSULE | Refills: 0 | Status: SHIPPED | OUTPATIENT
Start: 2024-12-30 | End: 2025-01-04

## 2024-12-30 RX ORDER — OSELTAMIVIR PHOSPHATE 75 MG/1
75 CAPSULE ORAL EVERY 12 HOURS SCHEDULED
Status: DISCONTINUED | OUTPATIENT
Start: 2024-12-30 | End: 2024-12-30 | Stop reason: HOSPADM

## 2024-12-30 RX ORDER — METHOCARBAMOL 750 MG/1
750 TABLET, FILM COATED ORAL ONCE
Status: COMPLETED | OUTPATIENT
Start: 2024-12-30 | End: 2024-12-30

## 2024-12-30 RX ORDER — KETOROLAC TROMETHAMINE 15 MG/ML
15 INJECTION, SOLUTION INTRAMUSCULAR; INTRAVENOUS ONCE
Status: COMPLETED | OUTPATIENT
Start: 2024-12-30 | End: 2024-12-30

## 2024-12-30 RX ORDER — IOPAMIDOL 755 MG/ML
100 INJECTION, SOLUTION INTRAVASCULAR
Status: COMPLETED | OUTPATIENT
Start: 2024-12-30 | End: 2024-12-30

## 2024-12-30 RX ADMIN — IOPAMIDOL 95 ML: 755 INJECTION, SOLUTION INTRAVENOUS at 10:03

## 2024-12-30 RX ADMIN — OSELTAMIVIR PHOSPHATE 75 MG: 75 CAPSULE ORAL at 12:08

## 2024-12-30 RX ADMIN — METHOCARBAMOL 750 MG: 750 TABLET ORAL at 12:08

## 2024-12-30 RX ADMIN — KETOROLAC TROMETHAMINE 15 MG: 15 INJECTION, SOLUTION INTRAMUSCULAR; INTRAVENOUS at 08:24

## 2024-12-30 RX ADMIN — IPRATROPIUM BROMIDE AND ALBUTEROL SULFATE 3 ML: 2.5; .5 SOLUTION RESPIRATORY (INHALATION) at 08:31

## 2024-12-30 NOTE — ED PROVIDER NOTES
MD ATTESTATION NOTE    SHARED VISIT: This visit was performed by BOTH a physician and an APC. The substantive portion of the medical decision making was performed by this attesting physician who made or approved the management plan and takes responsibility for patient management. All studies in the APC note (if performed) were independently interpreted by me.     The GLENYS and I have discussed this patient's history, physical exam, and treatment plan.  I have reviewed the documentation and affirm the documentation and agree with the treatment and plan.  The attached note describes my personal findings.      Independent Historians: Patient    A complete HPI/ROS/PMH/PSH/SH/FH are unobtainable due to: Not    Chronic or social conditions impacting patient care (social determinants of health): None    Josh Hinojosa is a 49 y.o. male who presents to the ED c/o acute shortness of breath beyond his baseline.  Patient with chronic respiratory failure on 3 L of oxygen requiring more over the last few days.  Patient with productive cough fever and bodyaches including neck pain.  Patient denies any vomiting or diarrhea.  Patient denies any specific sick contacts.          On exam:  GENERAL: Obese chronically ill-appearing male, conversant, alert, no acute distress  SKIN: Warm, dry  HENT: Normocephalic, atraumatic  EYES: no scleral icterus  CV: regular rhythm, regular rate  RESPIRATORY: normal effort, diminished at the bases with no wheezing  NEURO: alert, moves all extremities, follows commands                                                             Labs  Recent Results (from the past 24 hours)   ECG 12 Lead Dyspnea    Collection Time: 12/30/24  8:15 AM   Result Value Ref Range    QT Interval 407 ms    QTC Interval 487 ms   Comprehensive Metabolic Panel    Collection Time: 12/30/24  8:16 AM    Specimen: Blood   Result Value Ref Range    Glucose 142 (H) 65 - 99 mg/dL    BUN 16 6 - 20 mg/dL    Creatinine 0.93 0.76 - 1.27 mg/dL     Sodium 140 136 - 145 mmol/L    Potassium 4.1 3.5 - 5.2 mmol/L    Chloride 99 98 - 107 mmol/L    CO2 33.0 (H) 22.0 - 29.0 mmol/L    Calcium 8.5 (L) 8.6 - 10.5 mg/dL    Total Protein 6.5 6.0 - 8.5 g/dL    Albumin 4.0 3.5 - 5.2 g/dL    ALT (SGPT) 15 1 - 41 U/L    AST (SGOT) 19 1 - 40 U/L    Alkaline Phosphatase 62 39 - 117 U/L    Total Bilirubin 0.3 0.0 - 1.2 mg/dL    Globulin 2.5 gm/dL    A/G Ratio 1.6 g/dL    BUN/Creatinine Ratio 17.2 7.0 - 25.0    Anion Gap 8.0 5.0 - 15.0 mmol/L    eGFR 101.3 >60.0 mL/min/1.73   BNP    Collection Time: 12/30/24  8:16 AM    Specimen: Blood   Result Value Ref Range    proBNP 332.0 0.0 - 450.0 pg/mL   High Sensitivity Troponin T    Collection Time: 12/30/24  8:16 AM    Specimen: Blood   Result Value Ref Range    HS Troponin T 17 <22 ng/L   Procalcitonin    Collection Time: 12/30/24  8:16 AM    Specimen: Blood   Result Value Ref Range    Procalcitonin 0.09 0.00 - 0.25 ng/mL   Lactic Acid, Plasma    Collection Time: 12/30/24  8:16 AM    Specimen: Blood   Result Value Ref Range    Lactate 0.8 0.5 - 2.0 mmol/L   CBC Auto Differential    Collection Time: 12/30/24  8:16 AM    Specimen: Blood   Result Value Ref Range    WBC 4.20 3.40 - 10.80 10*3/mm3    RBC 5.55 4.14 - 5.80 10*6/mm3    Hemoglobin 16.9 13.0 - 17.7 g/dL    Hematocrit 51.8 (H) 37.5 - 51.0 %    MCV 93.3 79.0 - 97.0 fL    MCH 30.5 26.6 - 33.0 pg    MCHC 32.6 31.5 - 35.7 g/dL    RDW 13.4 12.3 - 15.4 %    RDW-SD 46.1 37.0 - 54.0 fl    MPV 10.2 6.0 - 12.0 fL    Platelets 140 140 - 450 10*3/mm3    Neutrophil % 68.9 42.7 - 76.0 %    Lymphocyte % 16.4 (L) 19.6 - 45.3 %    Monocyte % 11.9 5.0 - 12.0 %    Eosinophil % 2.1 0.3 - 6.2 %    Basophil % 0.5 0.0 - 1.5 %    Immature Grans % 0.2 0.0 - 0.5 %    Neutrophils, Absolute 2.89 1.70 - 7.00 10*3/mm3    Lymphocytes, Absolute 0.69 (L) 0.70 - 3.10 10*3/mm3    Monocytes, Absolute 0.50 0.10 - 0.90 10*3/mm3    Eosinophils, Absolute 0.09 0.00 - 0.40 10*3/mm3    Basophils, Absolute 0.02 0.00 -  0.20 10*3/mm3    Immature Grans, Absolute 0.01 0.00 - 0.05 10*3/mm3   Respiratory Panel PCR w/COVID-19(SARS-CoV-2) [facility]/DEBBY/FER/[facility] In-House, NP Swab in UTM/VTM, 2 HR TAT - Swab, Nasopharynx    Collection Time: 12/30/24  8:18 AM    Specimen: Nasopharynx; Swab   Result Value Ref Range    ADENOVIRUS, PCR Not Detected Not Detected    Coronavirus 229E Not Detected Not Detected    Coronavirus HKU1 Not Detected Not Detected    Coronavirus NL63 Not Detected Not Detected    Coronavirus OC43 Not Detected Not Detected    COVID19 Not Detected Not Detected - Ref. Range    Human Metapneumovirus Not Detected Not Detected    Human Rhinovirus/Enterovirus Not Detected Not Detected    Influenza A H1 2009 PCR Detected (A) Not Detected    Influenza B PCR Not Detected Not Detected    Parainfluenza Virus 1 Not Detected Not Detected    Parainfluenza Virus 2 Not Detected Not Detected    Parainfluenza Virus 3 Not Detected Not Detected    Parainfluenza Virus 4 Not Detected Not Detected    RSV, PCR Not Detected Not Detected    Bordetella pertussis pcr Not Detected Not Detected    Bordetella parapertussis PCR Not Detected Not Detected    Chlamydophila pneumoniae PCR Not Detected Not Detected    Mycoplasma pneumo by PCR Not Detected Not Detected   High Sensitivity Troponin T 1Hr    Collection Time: 12/30/24 10:19 AM    Specimen: Blood   Result Value Ref Range    HS Troponin T 18 <22 ng/L    Troponin T Numeric Delta 1 Abnormal if >/=3 ng/L       Radiology  CT Angiogram Chest Pulmonary Embolism    Result Date: 12/30/2024  CT ANGIOGRAM CHEST PULMONARY EMBOLISM-  INDICATION: Acute on chronic hypoxia  COMPARISON: CTA chest March 26, 2024  TECHNIQUE: CTA chest with IV contrast. Coronal and sagittal reformats. Three dimensional reconstructions. Radiation dose reduction techniques were utilized, including automated exposure control and exposure modulation based on body size.  FINDINGS:  Pulmonary arteries: Enlarged main pulmonary artery,  measures 4.8 cm. Streak artifact from contrast in the venous system. Suboptimal opacification of some segmental and subsegmental pulmonary arteries, limits evaluation. No definitive pulmonary embolism identified.  Chest wall: No lymphadenopathy.  Mediastinum: Coronary artery atherosclerotic calcifications. Borderline cardiomegaly. No pericardial effusion. Mild mediastinal lymphadenopathy. For example, AP window lymph node, series 5, axial mage 57, measures 1.2 cm, unchanged. For example, right paratracheal/4R lymph node, series 5, axial mage 49, measures 2.3 cm, previously 1.5 cm. For example, right paratracheal lymph node, series 5, axial mage 56, measures 1.3 cm, previously 1.1 cm. Mildly prominent hilar lymph nodes.  Lung/pleura: No effusions. Patent central airways. Ill-defined bronchovascular opacities in the right upper lobe ill-defined right middle lobe opacity, with volume loss. Small amount of posterior dependent and bibasilar subsegmental atelectasis. Suspect some subsegmental atelectasis in the left upper lobe.  Upper abdomen: Suspect hepatic steatosis. Cholelithiasis. Small fluid attenuating cyst seen in the right mid kidney. Colonic diverticulosis. Tortuous and ectatic appearance to the infrarenal abdominal aorta, incompletely imaged. Trace perisplenic fluid.  Osseous structures: No destructive osseous lesions. Multilevel Schmorl's nodes.       1. No pulmonary embolism identified. Some of the segmental and subsegmental pulmonary arteries are poorly evaluated secondary to suboptimal opacification with contrast and streak artifact. 2. Enlarged main pulmonary artery, can be seen with pulmonary artery hypertension. 3. Borderline cardiomegaly. 4. Bronchovascular right upper lobe opacities and right middle lobe opacity. Possible bronchopneumonia. Suspect subsegmental atelectasis in the left lung. 5. Mild mediastinal lymphadenopathy. 6. Hepatic steatosis. 7. Cholelithiasis  This report was finalized on  12/30/2024 10:35 AM by Dr. Mtai Kirby M.D on Workstation: [id number]      XR Chest 1 View    Result Date: 12/30/2024  XR CHEST 1 VW-   INDICATION: Shortness of air  COMPARISON: Chest radiograph March 26, 2024  TECHNIQUE: 1 view chest  FINDINGS:  Vascular congestion. Small ill-defined right midlung and bibasilar opacities. No effusions. Enlarged cardiac silhouette.       1. Cardiomegaly with vascular congestion 2. Small ill-defined right midlung and bibasilar opacities. Could be areas of subsegmental atelectasis or multifocal pneumonia.  This report was finalized on 12/30/2024 9:01 AM by Dr. Mati Kirby M.D on Workstation: [id number]       Medical Decision Making:  ED Course as of 12/30/24 1805   Mon Dec 30, 2024   0819 EKG ER MD interpretation   Time: 8: 15  Rhythm and rate: Normal sinus rhythm at a rate of 86  Axis: Normal  P waves: Normal except for left atrial enlargement  QRS complexes: Right bundle branch block with ST segment and T wave changes throughout precordial leads and widened QRS duration of 165 ms  Comparison EKG is from March 26, 2024 and had similar findings including ST segment changes [AR]   0837 WBC: 4.20 [MP]   0837 Hemoglobin: 16.9 [MP]   1049 Influenza A H1 2009 PCR(!): Detected [MP]   1126 I spoke with Dr. Wynn with [facility].  Discussed patient presentation and ED findings.  He agrees admit patient to a telemetry bed. [MP]   1220 Patient has now decided that he no longer wants to be admitted.  Discussed risks of leaving, especially given that he is on supplemental oxygen.  He verbalizes understanding and still wishes to leave AGAINST MEDICAL ADVICE.  Will prescribe the patient Tamiflu.  Discussed ED return precautions.  Patient is leaving AGAINST MEDICAL ADVICE. [MP]      ED Course User Index  [AR] Stephanie Matt MD  [MP] Katie Ramires PA-C       MDM: This patient presents with dyspnea, most likely secondary to viral infection or pneumonia.  The differential diagnosis  list includes but is not limited to:   - acute cardiac etiologies like ACS, CHF, pericardial effusion or even tamponade  - acute respiratory etiologies like acute PE, pneumothorax , asthma, COPD exacerbation, allergic etiologies, or infectious etiologies such as PNA   - non-cardiopulmonary causes like toxidromes, metabolic etiologies such as acidemia or electrolyte derangements or even DKA, sepsis, neurologic causes including GBS and myasthenia gravis  Plan EKG, CXR, Labs incl bnp and trop and +/- viral swab    Procedures:  Procedures        PPE: I followed hospital protocols for proper PPE based on patient presentation including use of N95 mask for suspected infectious respiratory conditions.  Proper hand hygiene was performed both before and after the patient encounter.          Diagnosis  Final diagnoses:   Acute on chronic respiratory failure with hypoxia   Influenza A   Bronchopneumonia       Note Disclaimer: At [facility], we believe that sharing information builds trust and better relationships. You are receiving this note because you recently visited [facility]. It is possible you will see health information before a provider has talked with you about it. This kind of information can be easy to misunderstand. To help you fully understand what it means for your health, we urge you to discuss this note with your provider.       Stephanie Matt MD  12/31/24 2296

## 2024-12-30 NOTE — ED PROVIDER NOTES
EMERGENCY DEPARTMENT ENCOUNTER  Room Number:  01/01  PCP: Inocencia Rojas APRN  Independent Historians: Patient      HPI:  Chief Complaint: had concerns including Shortness of Breath and Fever.     A complete HPI/ROS/PMH/PSH/SH/FH are unobtainable due to: None    Chronic or social conditions impacting patient care (Social Determinants of Health): None      Context: Josh Hinojosa is a 49 y.o. male with a medical history of chronic respiratory failure, COPD, hypertension, SHARMILA, diabetes, hyperlipidemia, and morbid obesity who presents to the ED c/o acute shortness of breath.  Patient reports he has been feeling short of breath for 1 month but it worsened over the last several days.  Worse when he tries to lay down.  Reports worsening cough, development of fever, bilateral neck pain.  He is chronically on 3 L oxygen nasal cannula.  No known sick contacts.  Patient denies headache, vomiting, diarrhea.  Upon arrival to emergency department, patient had O2 sat in the 80s on his baseline oxygen.  Currently on 4 L oxygen nasal cannula.  Patient has no other systemic complaints at this time.      Review of prior external notes (non-ED) -and- Review of prior external test results outside of this encounter:  Patient seen in office by PCP on 12/17/2024 for primary hypertension, hyperlipidemia, CHF, COPD.  Reviewed assessment and plan.  Medications refilled.  Patient will follow-up in 1 month.  Reviewed labs collected on 9/17/2024.  CBC with hemoglobin 16.4, CMP with creatinine 1.05.    Prescription drug monitoring program review:     N/A    PAST MEDICAL HISTORY  Active Ambulatory Problems     Diagnosis Date Noted    Acute hypoxemic respiratory failure 03/07/2024    Chronic respiratory failure with hypercapnia 03/26/2024    Pleuritic chest pain 03/26/2024    COPD (chronic obstructive pulmonary disease)     Hypertension     Morbid obesity with BMI of 50.0-59.9, adult 03/28/2024    Acute on chronic diastolic congestive heart  failure 2024    Bacterial pneumonia 2024    Acute respiratory failure with hypoxia 2024    Obstructive sleep apnea 2024    Type 2 diabetes mellitus with hyperglycemia, without long-term current use of insulin 2024    Candidiasis, intertrigo 2024    Marijuana smoker, continuous 2024    Cigarette nicotine dependence in remission 2024    Pure hypercholesterolemia 2024    Mild concentric left ventricular hypertrophy 2024    Cigarette nicotine dependence without complication 2024    Acute and chronic respiratory failure with hypoxia 2024     Resolved Ambulatory Problems     Diagnosis Date Noted    No Resolved Ambulatory Problems     No Additional Past Medical History         PAST SURGICAL HISTORY  Past Surgical History:   Procedure Laterality Date    APPENDECTOMY      STOMACH SURGERY           FAMILY HISTORY  Family History   Problem Relation Age of Onset    Heart attack Mother     Lung cancer Mother     Heart attack Sister     Hypertension Sister     Heart attack Sister     Stroke Brother          SOCIAL HISTORY  Social History     Socioeconomic History    Marital status: Single   Tobacco Use    Smoking status: Former     Current packs/day: 0.00     Types: Cigarettes     Quit date:      Years since quittin.9     Passive exposure: Past    Smokeless tobacco: Never   Vaping Use    Vaping status: Never Used   Substance and Sexual Activity    Alcohol use: Never    Drug use: Yes     Types: Marijuana    Sexual activity: Defer         ALLERGIES  Patient has no known allergies.      REVIEW OF SYSTEMS  Included in HPI  All systems reviewed and negative except for those discussed in HPI.      PHYSICAL EXAM    I have reviewed the triage vital signs and nursing notes.    ED Triage Vitals [24 0748]   Temp Heart Rate Resp BP SpO2   100.5 °F (38.1 °C) 98 18 -- 90 %      Temp src Heart Rate Source Patient Position BP Location FiO2 (%)   Tympanic -- --  -- --       Physical Exam  Constitutional:       General: He is not in acute distress.     Appearance: Normal appearance. He is morbidly obese.   HENT:      Head: Normocephalic and atraumatic.      Nose: Nose normal.      Mouth/Throat:      Mouth: Mucous membranes are moist.   Eyes:      Conjunctiva/sclera: Conjunctivae normal.      Pupils: Pupils are equal, round, and reactive to light.   Cardiovascular:      Rate and Rhythm: Normal rate and regular rhythm.      Pulses: Normal pulses.      Heart sounds: Normal heart sounds.   Pulmonary:      Effort: Pulmonary effort is normal.      Breath sounds: Normal breath sounds.      Comments: O2 sats 90% on 4 L oxygen nasal cannula  Abdominal:      General: There is no distension.   Musculoskeletal:         General: Normal range of motion.      Cervical back: Normal range of motion and neck supple.   Skin:     General: Skin is warm.      Capillary Refill: Capillary refill takes less than 2 seconds.   Neurological:      General: No focal deficit present.      Mental Status: He is alert and oriented to person, place, and time.   Psychiatric:         Mood and Affect: Mood normal.             LAB RESULTS  Recent Results (from the past 24 hours)   ECG 12 Lead Dyspnea    Collection Time: 12/30/24  8:15 AM   Result Value Ref Range    QT Interval 407 ms    QTC Interval 487 ms   Comprehensive Metabolic Panel    Collection Time: 12/30/24  8:16 AM    Specimen: Blood   Result Value Ref Range    Glucose 142 (H) 65 - 99 mg/dL    BUN 16 6 - 20 mg/dL    Creatinine 0.93 0.76 - 1.27 mg/dL    Sodium 140 136 - 145 mmol/L    Potassium 4.1 3.5 - 5.2 mmol/L    Chloride 99 98 - 107 mmol/L    CO2 33.0 (H) 22.0 - 29.0 mmol/L    Calcium 8.5 (L) 8.6 - 10.5 mg/dL    Total Protein 6.5 6.0 - 8.5 g/dL    Albumin 4.0 3.5 - 5.2 g/dL    ALT (SGPT) 15 1 - 41 U/L    AST (SGOT) 19 1 - 40 U/L    Alkaline Phosphatase 62 39 - 117 U/L    Total Bilirubin 0.3 0.0 - 1.2 mg/dL    Globulin 2.5 gm/dL    A/G Ratio 1.6 g/dL     BUN/Creatinine Ratio 17.2 7.0 - 25.0    Anion Gap 8.0 5.0 - 15.0 mmol/L    eGFR 101.3 >60.0 mL/min/1.73   BNP    Collection Time: 12/30/24  8:16 AM    Specimen: Blood   Result Value Ref Range    proBNP 332.0 0.0 - 450.0 pg/mL   High Sensitivity Troponin T    Collection Time: 12/30/24  8:16 AM    Specimen: Blood   Result Value Ref Range    HS Troponin T 17 <22 ng/L   Procalcitonin    Collection Time: 12/30/24  8:16 AM    Specimen: Blood   Result Value Ref Range    Procalcitonin 0.09 0.00 - 0.25 ng/mL   Lactic Acid, Plasma    Collection Time: 12/30/24  8:16 AM    Specimen: Blood   Result Value Ref Range    Lactate 0.8 0.5 - 2.0 mmol/L   CBC Auto Differential    Collection Time: 12/30/24  8:16 AM    Specimen: Blood   Result Value Ref Range    WBC 4.20 3.40 - 10.80 10*3/mm3    RBC 5.55 4.14 - 5.80 10*6/mm3    Hemoglobin 16.9 13.0 - 17.7 g/dL    Hematocrit 51.8 (H) 37.5 - 51.0 %    MCV 93.3 79.0 - 97.0 fL    MCH 30.5 26.6 - 33.0 pg    MCHC 32.6 31.5 - 35.7 g/dL    RDW 13.4 12.3 - 15.4 %    RDW-SD 46.1 37.0 - 54.0 fl    MPV 10.2 6.0 - 12.0 fL    Platelets 140 140 - 450 10*3/mm3    Neutrophil % 68.9 42.7 - 76.0 %    Lymphocyte % 16.4 (L) 19.6 - 45.3 %    Monocyte % 11.9 5.0 - 12.0 %    Eosinophil % 2.1 0.3 - 6.2 %    Basophil % 0.5 0.0 - 1.5 %    Immature Grans % 0.2 0.0 - 0.5 %    Neutrophils, Absolute 2.89 1.70 - 7.00 10*3/mm3    Lymphocytes, Absolute 0.69 (L) 0.70 - 3.10 10*3/mm3    Monocytes, Absolute 0.50 0.10 - 0.90 10*3/mm3    Eosinophils, Absolute 0.09 0.00 - 0.40 10*3/mm3    Basophils, Absolute 0.02 0.00 - 0.20 10*3/mm3    Immature Grans, Absolute 0.01 0.00 - 0.05 10*3/mm3   Respiratory Panel PCR w/COVID-19(SARS-CoV-2) [facility]/DEBBY/FER/[facility] In-House, NP Swab in UTM/VTM, 2 HR TAT - Swab, Nasopharynx    Collection Time: 12/30/24  8:18 AM    Specimen: Nasopharynx; Swab   Result Value Ref Range    ADENOVIRUS, PCR Not Detected Not Detected    Coronavirus 229E Not Detected Not Detected    Coronavirus HKU1 Not  Detected Not Detected    Coronavirus NL63 Not Detected Not Detected    Coronavirus OC43 Not Detected Not Detected    COVID19 Not Detected Not Detected - Ref. Range    Human Metapneumovirus Not Detected Not Detected    Human Rhinovirus/Enterovirus Not Detected Not Detected    Influenza A H1 2009 PCR Detected (A) Not Detected    Influenza B PCR Not Detected Not Detected    Parainfluenza Virus 1 Not Detected Not Detected    Parainfluenza Virus 2 Not Detected Not Detected    Parainfluenza Virus 3 Not Detected Not Detected    Parainfluenza Virus 4 Not Detected Not Detected    RSV, PCR Not Detected Not Detected    Bordetella pertussis pcr Not Detected Not Detected    Bordetella parapertussis PCR Not Detected Not Detected    Chlamydophila pneumoniae PCR Not Detected Not Detected    Mycoplasma pneumo by PCR Not Detected Not Detected   High Sensitivity Troponin T 1Hr    Collection Time: 12/30/24 10:19 AM    Specimen: Blood   Result Value Ref Range    HS Troponin T 18 <22 ng/L    Troponin T Numeric Delta 1 Abnormal if >/=3 ng/L         RADIOLOGY  CT Angiogram Chest Pulmonary Embolism    Result Date: 12/30/2024  CT ANGIOGRAM CHEST PULMONARY EMBOLISM-  INDICATION: Acute on chronic hypoxia  COMPARISON: CTA chest March 26, 2024  TECHNIQUE: CTA chest with IV contrast. Coronal and sagittal reformats. Three dimensional reconstructions. Radiation dose reduction techniques were utilized, including automated exposure control and exposure modulation based on body size.  FINDINGS:  Pulmonary arteries: Enlarged main pulmonary artery, measures 4.8 cm. Streak artifact from contrast in the venous system. Suboptimal opacification of some segmental and subsegmental pulmonary arteries, limits evaluation. No definitive pulmonary embolism identified.  Chest wall: No lymphadenopathy.  Mediastinum: Coronary artery atherosclerotic calcifications. Borderline cardiomegaly. No pericardial effusion. Mild mediastinal lymphadenopathy. For example, AP  window lymph node, series 5, axial mage 57, measures 1.2 cm, unchanged. For example, right paratracheal/4R lymph node, series 5, axial mage 49, measures 2.3 cm, previously 1.5 cm. For example, right paratracheal lymph node, series 5, axial mage 56, measures 1.3 cm, previously 1.1 cm. Mildly prominent hilar lymph nodes.  Lung/pleura: No effusions. Patent central airways. Ill-defined bronchovascular opacities in the right upper lobe ill-defined right middle lobe opacity, with volume loss. Small amount of posterior dependent and bibasilar subsegmental atelectasis. Suspect some subsegmental atelectasis in the left upper lobe.  Upper abdomen: Suspect hepatic steatosis. Cholelithiasis. Small fluid attenuating cyst seen in the right mid kidney. Colonic diverticulosis. Tortuous and ectatic appearance to the infrarenal abdominal aorta, incompletely imaged. Trace perisplenic fluid.  Osseous structures: No destructive osseous lesions. Multilevel Schmorl's nodes.       1. No pulmonary embolism identified. Some of the segmental and subsegmental pulmonary arteries are poorly evaluated secondary to suboptimal opacification with contrast and streak artifact. 2. Enlarged main pulmonary artery, can be seen with pulmonary artery hypertension. 3. Borderline cardiomegaly. 4. Bronchovascular right upper lobe opacities and right middle lobe opacity. Possible bronchopneumonia. Suspect subsegmental atelectasis in the left lung. 5. Mild mediastinal lymphadenopathy. 6. Hepatic steatosis. 7. Cholelithiasis  This report was finalized on 12/30/2024 10:35 AM by Dr. Mati Kirby M.D on Workstation: [id number]      XR Chest 1 View    Result Date: 12/30/2024  XR CHEST 1 VW-   INDICATION: Shortness of air  COMPARISON: Chest radiograph March 26, 2024  TECHNIQUE: 1 view chest  FINDINGS:  Vascular congestion. Small ill-defined right midlung and bibasilar opacities. No effusions. Enlarged cardiac silhouette.       1. Cardiomegaly with vascular  congestion 2. Small ill-defined right midlung and bibasilar opacities. Could be areas of subsegmental atelectasis or multifocal pneumonia.  This report was finalized on 12/30/2024 9:01 AM by Dr. Mati Kirby M.D on Workstation: [id number]         MEDICATIONS GIVEN IN ER  Medications   sodium chloride 0.9 % flush 10 mL (has no administration in time range)   oseltamivir (TAMIFLU) capsule 75 mg (75 mg Oral Given 12/30/24 1208)   ketorolac (TORADOL) injection 15 mg (15 mg Intravenous Given 12/30/24 0824)   ipratropium-albuterol (DUO-NEB) nebulizer solution 3 mL (3 mL Nebulization Given 12/30/24 0831)   iopamidol (ISOVUE-370) 76 % injection 100 mL (95 mL Intravenous Given 12/30/24 1003)   methocarbamol (ROBAXIN) tablet 750 mg (750 mg Oral Given 12/30/24 1208)         ORDERS PLACED DURING THIS VISIT:  Orders Placed This Encounter   Procedures    Respiratory Panel PCR w/COVID-19(SARS-CoV-2) [facility]/DEBBY/FER/PAD/[facility] In-House, NP Swab in UTM/VTM, 2 HR TAT - Swab, Nasopharynx    Blood Culture - Blood,    Blood Culture - Blood,    XR Chest 1 View    CT Angiogram Chest Pulmonary Embolism    Comprehensive Metabolic Panel    BNP    High Sensitivity Troponin T    Procalcitonin    Lactic Acid, Plasma    CBC Auto Differential    High Sensitivity Troponin T 1Hr    [facility] (on-call MD unless specified) Details    ECG 12 Lead Dyspnea    Insert Peripheral IV    Inpatient Admission    CBC & Differential         OUTPATIENT MEDICATION MANAGEMENT:  Current Facility-Administered Medications Ordered in Epic   Medication Dose Route Frequency Provider Last Rate Last Admin    oseltamivir (TAMIFLU) capsule 75 mg  75 mg Oral Q12H Hermann Wynn MD   75 mg at 12/30/24 1208    sodium chloride 0.9 % flush 10 mL  10 mL Intravenous PRN Katie Ramires PA-C         Current Outpatient Medications Ordered in Epic   Medication Sig Dispense Refill    atorvastatin (LIPITOR) 10 MG tablet Take 1 tablet by mouth Every Night. 90 tablet 1     Fluticasone-Umeclidin-Vilant (Trelegy Ellipta) 200-62.5-25 MCG/ACT inhaler Inhale 1 puff Daily. 60 each 3    ipratropium-albuterol (DUO-NEB) 0.5-2.5 mg/3 ml nebulizer Take 3 mL by nebulization Every 4 (Four) Hours As Needed for Wheezing.      lisinopril (PRINIVIL,ZESTRIL) 10 MG tablet Take 1 tablet by mouth Daily. 90 tablet 1    metFORMIN (GLUCOPHAGE) 500 MG tablet TAKE 1 TABLET BY MOUTH DAILY WITH BREAKFAST 90 tablet 1    torsemide (DEMADEX) 20 MG tablet Take 1 tablet by mouth Daily. 90 tablet 1    oseltamivir (TAMIFLU) 75 MG capsule Take 1 capsule by mouth 2 (Two) Times a Day for 5 days. 9 capsule 0         PROGRESS, DATA ANALYSIS, CONSULTS, AND MEDICAL DECISION MAKING  All labs have been independently interpreted by me.  All radiology studies have been reviewed by me. All EKG's have been independently viewed and interpreted by me.  Discussion below represents my analysis of pertinent findings related to patient's condition, differential diagnosis, treatment plan and final disposition.    Differential diagnosis includes but is not limited to COPD exacerbation, CHF, pneumonia.      39 minutes of critical care provided. This time excludes other billable procedures. Time does include preparation of documents, medical consultations, review of old records, and direct bedside care. Patient is at high risk for life-threatening deterioration due to acute on chronic respiratory failure requiring reassessment after nebulizer treatment, increase supplemental oxygen, admission to hospital.         ED Course as of 12/30/24 1227   Mon Dec 30, 2024   0819 EKG ER MD interpretation   Time: 8: 15  Rhythm and rate: Normal sinus rhythm at a rate of 86  Axis: Normal  P waves: Normal except for left atrial enlargement  QRS complexes: Right bundle branch block with ST segment and T wave changes throughout precordial leads and widened QRS duration of 165 ms  Comparison EKG is from March 26, 2024 and had similar findings including ST  segment changes [AR]   0837 WBC: 4.20 [MP]   0837 Hemoglobin: 16.9 [MP]   1049 Influenza A H1 2009 PCR(!): Detected [MP]   1126 I spoke with Dr. Wynn with A.  Discussed patient presentation and ED findings.  He agrees admit patient to a telemetry bed. [MP]   1220 Patient has now decided that he no longer wants to be admitted.  Discussed risks of leaving, especially given that he is on supplemental oxygen.  He verbalizes understanding and still wishes to leave AGAINST MEDICAL ADVICE.  Will prescribe the patient Tamiflu.  Discussed ED return precautions.  Patient is leaving AGAINST MEDICAL ADVICE. [MP]      ED Course User Index  [AR] Stephanie Matt MD  [MP] Katie Ramires PA-C             AS OF 12:27 EST VITALS:    BP - 143/88  HR - 81  TEMP - 99.2 °F (37.3 °C) (Oral)  O2 SATS - 92%    COMPLEXITY OF CARE  The patient requires admission.      DIAGNOSIS  Final diagnoses:   Acute on chronic respiratory failure with hypoxia   Influenza A   Bronchopneumonia         DISPOSITION  ED Disposition       ED Disposition   AMA    Condition   --    Comment   Level of Care: Telemetry [5]  Diagnosis: Acute on chronic respiratory failure [[id number]]  Admitting Physician: RACHEL WYNN [[id number]]  Attending Physician: RACHEL WYNN [[id number]]  Isolate for COVID?: No [0]  Certification: I Certify That Inpatient Hospi marcell Services Are Medically Necessary For Greater Than 2 Midnights                  Please note that portions of this document were completed with a voice recognition program.    Note Disclaimer: At [facility], we believe that sharing information builds trust and better relationships. You are receiving this note because you recently visited [facility]. It is possible you will see health information before a provider has talked with you about it. This kind of information can be easy to misunderstand. To help you fully understand what it means for your health, we urge you to discuss this note with your  provider.     Katie Ramires PA-C  12/30/24 1132       Katie Ramires PA-C  12/30/24 1223

## 2024-12-30 NOTE — ED NOTES
Patient to ed via private vehicle from home for cough, fever, SOA x1 month patient on 3L o2 baseline was 82% in triage. Chaned to 5L NC o2 90%

## 2024-12-30 NOTE — DISCHARGE INSTRUCTIONS
You are leaving the hospital AGAINST MEDICAL ADVICE.  I would recommend taking the Tamiflu twice a day for the next 5 days.  You have been given a dose in the emergency room.  If your oxygen remains low, I recommend returning to the emergency department.

## 2024-12-30 NOTE — PROGRESS NOTES
Clinical Pharmacy Services: Medication History    Josh Hinojosa is a 49 y.o. male presenting to [facility] for   Chief Complaint   Patient presents with    Shortness of Breath    Fever       He  has a past medical history of COPD (chronic obstructive pulmonary disease) and Hypertension.    Allergies as of 12/30/2024    (No Known Allergies)       Medication information was obtained from: Pharmacy  Pharmacy and Phone Number:   [id number] - [place], [place] - [address] AT [address] [facility] - [phone]  - [phone] FX  [address] [address]  Phone: [phone] Fax: [phone]    [facility] [place]  [address] [place] [id number]  Phone: [phone] Fax: [phone]        Prior to Admission Medications       Prescriptions Last Dose Informant Patient Reported? Taking?    atorvastatin (LIPITOR) 10 MG tablet  Pharmacy No Yes    Take 1 tablet by mouth Every Night.    Fluticasone-Umeclidin-Vilant (Trelegy Ellipta) 200-62.5-25 MCG/ACT inhaler  Pharmacy No Yes    Inhale 1 puff Daily.    ipratropium-albuterol (DUO-NEB) 0.5-2.5 mg/3 ml nebulizer  Pharmacy Yes Yes    Take 3 mL by nebulization Every 4 (Four) Hours As Needed for Wheezing.    lisinopril (PRINIVIL,ZESTRIL) 10 MG tablet  Pharmacy No Yes    Take 1 tablet by mouth Daily.    metFORMIN (GLUCOPHAGE) 500 MG tablet  Pharmacy No Yes    TAKE 1 TABLET BY MOUTH DAILY WITH BREAKFAST    torsemide (DEMADEX) 20 MG tablet  Pharmacy No Yes    Take 1 tablet by mouth Daily.              Medication notes:     This medication list is complete to the best of my knowledge as of 12/30/2024    Please call if questions.    Harshil Rivera  Medication History Technician  [phone]    12/30/2024 11:20 EST

## 2024-12-30 NOTE — Clinical Note
Level of Care: Telemetry [5]   Diagnosis: Acute on chronic respiratory failure [[id number]]   Admitting Physician: RACHEL SWAIN [[id number]]   Attending Physician: RACHEL SWAIN [[id number]]   Isolate for COVID?: No [0]   Certification: I Certify That Inpatient Hospital Services Are Medically Necessary For Greater Than 2 Midnights

## 2024-12-31 NOTE — PAYOR COMM NOTE
"Josh Hinojosa (49 y.o. Male)     PLEASE SEE ATTACHED FOR INPT AUTH       PT LEFT AMA       REF #  PT ID      [id number]       PLEASE CALL IRMA BERGMAN RN/ DEPT @ [phone]  OR FAX  DEPARTMENT @  [phone]    THANK YOU   IRMA BERGMAN RN  [facility]          Date of Birth   1975    Social Security Number       Address   [address] [place] [address]    Home Phone   [phone]    MRN   [id number]       [facility]   [employer]    Marital Status   Single                            Admission Date   12/30/24    Admission Type   Emergency    Admitting Provider   Hermann Wynn MD    Attending Provider       Department, Room/Bed   [facility] EMERGENCY DEPARTMENT, 01/01       Discharge Date   12/30/2024    Discharge Disposition   Left Against Medical Advice    Discharge Destination                                 Attending Provider: (none)   Allergies: No Known Allergies    Isolation: None   Infection: Influenza (12/30/24)   Code Status: Prior    Ht: 175.3 cm (69\")   Wt: 168 kg (370 lb)    Admission Cmt: None   Principal Problem: Influenza A with pneumonia [J09.X1]                   Active Insurance as of 12/30/2024       Primary Coverage       Payor Plan Insurance Group Employer/Plan Group    [facility] [id number]       Payor Plan Address Payor Plan Phone Number Payor Plan Fax Number Effective Dates    [address] [address]   1/1/2021 - None Entered    [facility] [id number]         Subscriber Name Subscriber Birth Date Member ID       JOSH HINOJOSA 1975 [id number]                     Emergency Contacts        (Rel.) Home Phone Work Phone Mobile Phone    elizabeth washington (Sister) -- -- [phone]              [place]: NPI [id number]  Tax ID [id number]     Emergency Department Notes        Katie Ramires PA-C at 12/30/24 2035       Attestation signed by Stephanie Matt MD at 12/30/24 1401  "       SHARED APC FACE TO FACE: I performed a substantive part of the MDM during the patient's E/M visit. I personally evaluated and examined the patient. I personally made or approved the documented management plan and acknowledge its risk of complications.   Stephanie Matt MD 12/30/2024 14:00 EST                          EMERGENCY DEPARTMENT ENCOUNTER  Room Number:  01/01  PCP: Inocencia Rojas APRN  Independent Historians: Patient      HPI:  Chief Complaint: had concerns including Shortness of Breath and Fever.     A complete HPI/ROS/PMH/PSH/SH/FH are unobtainable due to: None    Chronic or social conditions impacting patient care (Social Determinants of Health): None      Context: Josh Hinojosa is a 49 y.o. male with a medical history of chronic respiratory failure, COPD, hypertension, SHARMILA, diabetes, hyperlipidemia, and morbid obesity who presents to the ED c/o acute shortness of breath.  Patient reports he has been feeling short of breath for 1 month but it worsened over the last several days.  Worse when he tries to lay down.  Reports worsening cough, development of fever, bilateral neck pain.  He is chronically on 3 L oxygen nasal cannula.  No known sick contacts.  Patient denies headache, vomiting, diarrhea.  Upon arrival to emergency department, patient had O2 sat in the 80s on his baseline oxygen.  Currently on 4 L oxygen nasal cannula.  Patient has no other systemic complaints at this time.      Review of prior external notes (non-ED) -and- Review of prior external test results outside of this encounter:  Patient seen in office by PCP on 12/17/2024 for primary hypertension, hyperlipidemia, CHF, COPD.  Reviewed assessment and plan.  Medications refilled.  Patient will follow-up in 1 month.  Reviewed labs collected on 9/17/2024.  CBC with hemoglobin 16.4, CMP with creatinine 1.05.    Prescription drug monitoring program review:     N/A    PAST MEDICAL HISTORY  Active Ambulatory Problems     Diagnosis  Date Noted    Acute hypoxemic respiratory failure 2024    Chronic respiratory failure with hypercapnia 2024    Pleuritic chest pain 2024    COPD (chronic obstructive pulmonary disease)     Hypertension     Morbid obesity with BMI of 50.0-59.9, adult 2024    Acute on chronic diastolic congestive heart failure 2024    Bacterial pneumonia 2024    Acute respiratory failure with hypoxia 2024    Obstructive sleep apnea 2024    Type 2 diabetes mellitus with hyperglycemia, without long-term current use of insulin 2024    Candidiasis, intertrigo 2024    Marijuana smoker, continuous 2024    Cigarette nicotine dependence in remission 2024    Pure hypercholesterolemia 2024    Mild concentric left ventricular hypertrophy 2024    Cigarette nicotine dependence without complication 2024    Acute and chronic respiratory failure with hypoxia 2024     Resolved Ambulatory Problems     Diagnosis Date Noted    No Resolved Ambulatory Problems     No Additional Past Medical History         PAST SURGICAL HISTORY  Past Surgical History:   Procedure Laterality Date    APPENDECTOMY      STOMACH SURGERY           FAMILY HISTORY  Family History   Problem Relation Age of Onset    Heart attack Mother     Lung cancer Mother     Heart attack Sister     Hypertension Sister     Heart attack Sister     Stroke Brother          SOCIAL HISTORY  Social History     Socioeconomic History    Marital status: Single   Tobacco Use    Smoking status: Former     Current packs/day: 0.00     Types: Cigarettes     Quit date:      Years since quittin.9     Passive exposure: Past    Smokeless tobacco: Never   Vaping Use    Vaping status: Never Used   Substance and Sexual Activity    Alcohol use: Never    Drug use: Yes     Types: Marijuana    Sexual activity: Defer         ALLERGIES  Patient has no known allergies.      REVIEW OF SYSTEMS  Included in HPI  All systems  reviewed and negative except for those discussed in HPI.      PHYSICAL EXAM    I have reviewed the triage vital signs and nursing notes.    ED Triage Vitals [12/30/24 0748]   Temp Heart Rate Resp BP SpO2   100.5 °F (38.1 °C) 98 18 -- 90 %      Temp src Heart Rate Source Patient Position BP Location FiO2 (%)   Tympanic -- -- -- --       Physical Exam  Constitutional:       General: He is not in acute distress.     Appearance: Normal appearance. He is morbidly obese.   HENT:      Head: Normocephalic and atraumatic.      Nose: Nose normal.      Mouth/Throat:      Mouth: Mucous membranes are moist.   Eyes:      Conjunctiva/sclera: Conjunctivae normal.      Pupils: Pupils are equal, round, and reactive to light.   Cardiovascular:      Rate and Rhythm: Normal rate and regular rhythm.      Pulses: Normal pulses.      Heart sounds: Normal heart sounds.   Pulmonary:      Effort: Pulmonary effort is normal.      Breath sounds: Normal breath sounds.      Comments: O2 sats 90% on 4 L oxygen nasal cannula  Abdominal:      General: There is no distension.   Musculoskeletal:         General: Normal range of motion.      Cervical back: Normal range of motion and neck supple.   Skin:     General: Skin is warm.      Capillary Refill: Capillary refill takes less than 2 seconds.   Neurological:      General: No focal deficit present.      Mental Status: He is alert and oriented to person, place, and time.   Psychiatric:         Mood and Affect: Mood normal.             LAB RESULTS  Recent Results (from the past 24 hours)   ECG 12 Lead Dyspnea    Collection Time: 12/30/24  8:15 AM   Result Value Ref Range    QT Interval 407 ms    QTC Interval 487 ms   Comprehensive Metabolic Panel    Collection Time: 12/30/24  8:16 AM    Specimen: Blood   Result Value Ref Range    Glucose 142 (H) 65 - 99 mg/dL    BUN 16 6 - 20 mg/dL    Creatinine 0.93 0.76 - 1.27 mg/dL    Sodium 140 136 - 145 mmol/L    Potassium 4.1 3.5 - 5.2 mmol/L    Chloride 99 98 -  107 mmol/L    CO2 33.0 (H) 22.0 - 29.0 mmol/L    Calcium 8.5 (L) 8.6 - 10.5 mg/dL    Total Protein 6.5 6.0 - 8.5 g/dL    Albumin 4.0 3.5 - 5.2 g/dL    ALT (SGPT) 15 1 - 41 U/L    AST (SGOT) 19 1 - 40 U/L    Alkaline Phosphatase 62 39 - 117 U/L    Total Bilirubin 0.3 0.0 - 1.2 mg/dL    Globulin 2.5 gm/dL    A/G Ratio 1.6 g/dL    BUN/Creatinine Ratio 17.2 7.0 - 25.0    Anion Gap 8.0 5.0 - 15.0 mmol/L    eGFR 101.3 >60.0 mL/min/1.73   BNP    Collection Time: 12/30/24  8:16 AM    Specimen: Blood   Result Value Ref Range    proBNP 332.0 0.0 - 450.0 pg/mL   High Sensitivity Troponin T    Collection Time: 12/30/24  8:16 AM    Specimen: Blood   Result Value Ref Range    HS Troponin T 17 <22 ng/L   Procalcitonin    Collection Time: 12/30/24  8:16 AM    Specimen: Blood   Result Value Ref Range    Procalcitonin 0.09 0.00 - 0.25 ng/mL   Lactic Acid, Plasma    Collection Time: 12/30/24  8:16 AM    Specimen: Blood   Result Value Ref Range    Lactate 0.8 0.5 - 2.0 mmol/L   CBC Auto Differential    Collection Time: 12/30/24  8:16 AM    Specimen: Blood   Result Value Ref Range    WBC 4.20 3.40 - 10.80 10*3/mm3    RBC 5.55 4.14 - 5.80 10*6/mm3    Hemoglobin 16.9 13.0 - 17.7 g/dL    Hematocrit 51.8 (H) 37.5 - 51.0 %    MCV 93.3 79.0 - 97.0 fL    MCH 30.5 26.6 - 33.0 pg    MCHC 32.6 31.5 - 35.7 g/dL    RDW 13.4 12.3 - 15.4 %    RDW-SD 46.1 37.0 - 54.0 fl    MPV 10.2 6.0 - 12.0 fL    Platelets 140 140 - 450 10*3/mm3    Neutrophil % 68.9 42.7 - 76.0 %    Lymphocyte % 16.4 (L) 19.6 - 45.3 %    Monocyte % 11.9 5.0 - 12.0 %    Eosinophil % 2.1 0.3 - 6.2 %    Basophil % 0.5 0.0 - 1.5 %    Immature Grans % 0.2 0.0 - 0.5 %    Neutrophils, Absolute 2.89 1.70 - 7.00 10*3/mm3    Lymphocytes, Absolute 0.69 (L) 0.70 - 3.10 10*3/mm3    Monocytes, Absolute 0.50 0.10 - 0.90 10*3/mm3    Eosinophils, Absolute 0.09 0.00 - 0.40 10*3/mm3    Basophils, Absolute 0.02 0.00 - 0.20 10*3/mm3    Immature Grans, Absolute 0.01 0.00 - 0.05 10*3/mm3   Respiratory  Panel PCR w/COVID-19(SARS-CoV-2) ABEL/DEBBY/FER/PAD/COR/MILDRED In-House, NP Swab in UTM/VTM, 2 HR TAT - Swab, Nasopharynx    Collection Time: 12/30/24  8:18 AM    Specimen: Nasopharynx; Swab   Result Value Ref Range    ADENOVIRUS, PCR Not Detected Not Detected    Coronavirus 229E Not Detected Not Detected    Coronavirus HKU1 Not Detected Not Detected    Coronavirus NL63 Not Detected Not Detected    Coronavirus OC43 Not Detected Not Detected    COVID19 Not Detected Not Detected - Ref. Range    Human Metapneumovirus Not Detected Not Detected    Human Rhinovirus/Enterovirus Not Detected Not Detected    Influenza A H1 2009 PCR Detected (A) Not Detected    Influenza B PCR Not Detected Not Detected    Parainfluenza Virus 1 Not Detected Not Detected    Parainfluenza Virus 2 Not Detected Not Detected    Parainfluenza Virus 3 Not Detected Not Detected    Parainfluenza Virus 4 Not Detected Not Detected    RSV, PCR Not Detected Not Detected    Bordetella pertussis pcr Not Detected Not Detected    Bordetella parapertussis PCR Not Detected Not Detected    Chlamydophila pneumoniae PCR Not Detected Not Detected    Mycoplasma pneumo by PCR Not Detected Not Detected   High Sensitivity Troponin T 1Hr    Collection Time: 12/30/24 10:19 AM    Specimen: Blood   Result Value Ref Range    HS Troponin T 18 <22 ng/L    Troponin T Numeric Delta 1 Abnormal if >/=3 ng/L         RADIOLOGY  CT Angiogram Chest Pulmonary Embolism    Result Date: 12/30/2024  CT ANGIOGRAM CHEST PULMONARY EMBOLISM-  INDICATION: Acute on chronic hypoxia  COMPARISON: CTA chest March 26, 2024  TECHNIQUE: CTA chest with IV contrast. Coronal and sagittal reformats. Three dimensional reconstructions. Radiation dose reduction techniques were utilized, including automated exposure control and exposure modulation based on body size.  FINDINGS:  Pulmonary arteries: Enlarged main pulmonary artery, measures 4.8 cm. Streak artifact from contrast in the venous system. Suboptimal  opacification of some segmental and subsegmental pulmonary arteries, limits evaluation. No definitive pulmonary embolism identified.  Chest wall: No lymphadenopathy.  Mediastinum: Coronary artery atherosclerotic calcifications. Borderline cardiomegaly. No pericardial effusion. Mild mediastinal lymphadenopathy. For example, AP window lymph node, series 5, axial mage 57, measures 1.2 cm, unchanged. For example, right paratracheal/4R lymph node, series 5, axial mage 49, measures 2.3 cm, previously 1.5 cm. For example, right paratracheal lymph node, series 5, axial mage 56, measures 1.3 cm, previously 1.1 cm. Mildly prominent hilar lymph nodes.  Lung/pleura: No effusions. Patent central airways. Ill-defined bronchovascular opacities in the right upper lobe ill-defined right middle lobe opacity, with volume loss. Small amount of posterior dependent and bibasilar subsegmental atelectasis. Suspect some subsegmental atelectasis in the left upper lobe.  Upper abdomen: Suspect hepatic steatosis. Cholelithiasis. Small fluid attenuating cyst seen in the right mid kidney. Colonic diverticulosis. Tortuous and ectatic appearance to the infrarenal abdominal aorta, incompletely imaged. Trace perisplenic fluid.  Osseous structures: No destructive osseous lesions. Multilevel Schmorl's nodes.       1. No pulmonary embolism identified. Some of the segmental and subsegmental pulmonary arteries are poorly evaluated secondary to suboptimal opacification with contrast and streak artifact. 2. Enlarged main pulmonary artery, can be seen with pulmonary artery hypertension. 3. Borderline cardiomegaly. 4. Bronchovascular right upper lobe opacities and right middle lobe opacity. Possible bronchopneumonia. Suspect subsegmental atelectasis in the left lung. 5. Mild mediastinal lymphadenopathy. 6. Hepatic steatosis. 7. Cholelithiasis  This report was finalized on 12/30/2024 10:35 AM by Dr. Mati Kirby M.D on Workstation: [id number]      XR  Chest 1 View    Result Date: 12/30/2024  XR CHEST 1 VW-   INDICATION: Shortness of air  COMPARISON: Chest radiograph March 26, 2024  TECHNIQUE: 1 view chest  FINDINGS:  Vascular congestion. Small ill-defined right midlung and bibasilar opacities. No effusions. Enlarged cardiac silhouette.       1. Cardiomegaly with vascular congestion 2. Small ill-defined right midlung and bibasilar opacities. Could be areas of subsegmental atelectasis or multifocal pneumonia.  This report was finalized on 12/30/2024 9:01 AM by Dr. Mati Kirby M.D on Workstation: [id number]         MEDICATIONS GIVEN IN ER  Medications   sodium chloride 0.9 % flush 10 mL (has no administration in time range)   oseltamivir (TAMIFLU) capsule 75 mg (75 mg Oral Given 12/30/24 1208)   ketorolac (TORADOL) injection 15 mg (15 mg Intravenous Given 12/30/24 0824)   ipratropium-albuterol (DUO-NEB) nebulizer solution 3 mL (3 mL Nebulization Given 12/30/24 0831)   iopamidol (ISOVUE-370) 76 % injection 100 mL (95 mL Intravenous Given 12/30/24 1003)   methocarbamol (ROBAXIN) tablet 750 mg (750 mg Oral Given 12/30/24 1208)         ORDERS PLACED DURING THIS VISIT:  Orders Placed This Encounter   Procedures    Respiratory Panel PCR w/COVID-19(SARS-CoV-2) ABEL/DEBBY/FER/PAD/COR/MILDRED In-House, NP Swab in UTM/VTM, 2 HR TAT - Swab, Nasopharynx    Blood Culture - Blood,    Blood Culture - Blood,    XR Chest 1 View    CT Angiogram Chest Pulmonary Embolism    Comprehensive Metabolic Panel    BNP    High Sensitivity Troponin T    Procalcitonin    Lactic Acid, Plasma    CBC Auto Differential    High Sensitivity Troponin T 1Hr    [facility] (on-call MD unless specified) Details    ECG 12 Lead Dyspnea    Insert Peripheral IV    Inpatient Admission    CBC & Differential         OUTPATIENT MEDICATION MANAGEMENT:  Current Facility-Administered Medications Ordered in Epic   Medication Dose Route Frequency Provider Last Rate Last Admin    oseltamivir (TAMIFLU) capsule 75 mg  75 mg Oral  Q12H Hermann Wynn MD   75 mg at 12/30/24 1208    sodium chloride 0.9 % flush 10 mL  10 mL Intravenous PRN Katie Ramires, PA-C         Current Outpatient Medications Ordered in Epic   Medication Sig Dispense Refill    atorvastatin (LIPITOR) 10 MG tablet Take 1 tablet by mouth Every Night. 90 tablet 1    Fluticasone-Umeclidin-Vilant (Trelegy Ellipta) 200-62.5-25 MCG/ACT inhaler Inhale 1 puff Daily. 60 each 3    ipratropium-albuterol (DUO-NEB) 0.5-2.5 mg/3 ml nebulizer Take 3 mL by nebulization Every 4 (Four) Hours As Needed for Wheezing.      lisinopril (PRINIVIL,ZESTRIL) 10 MG tablet Take 1 tablet by mouth Daily. 90 tablet 1    metFORMIN (GLUCOPHAGE) 500 MG tablet TAKE 1 TABLET BY MOUTH DAILY WITH BREAKFAST 90 tablet 1    torsemide (DEMADEX) 20 MG tablet Take 1 tablet by mouth Daily. 90 tablet 1    oseltamivir (TAMIFLU) 75 MG capsule Take 1 capsule by mouth 2 (Two) Times a Day for 5 days. 9 capsule 0         PROGRESS, DATA ANALYSIS, CONSULTS, AND MEDICAL DECISION MAKING  All labs have been independently interpreted by me.  All radiology studies have been reviewed by me. All EKG's have been independently viewed and interpreted by me.  Discussion below represents my analysis of pertinent findings related to patient's condition, differential diagnosis, treatment plan and final disposition.    Differential diagnosis includes but is not limited to COPD exacerbation, CHF, pneumonia.      39 minutes of critical care provided. This time excludes other billable procedures. Time does include preparation of documents, medical consultations, review of old records, and direct bedside care. Patient is at high risk for life-threatening deterioration due to acute on chronic respiratory failure requiring reassessment after nebulizer treatment, increase supplemental oxygen, admission to hospital.         ED Course as of 12/30/24 1227   Mon Dec 30, 2024   0819 EKG ER MD interpretation   Time: 8: 15  Rhythm and rate: Normal sinus  rhythm at a rate of 86  Axis: Normal  P waves: Normal except for left atrial enlargement  QRS complexes: Right bundle branch block with ST segment and T wave changes throughout precordial leads and widened QRS duration of 165 ms  Comparison EKG is from March 26, 2024 and had similar findings including ST segment changes [AR]   0837 WBC: 4.20 [MP]   0837 Hemoglobin: 16.9 [MP]   1049 Influenza A H1 2009 PCR(!): Detected [MP]   1126 I spoke with Dr. Wynn with [facility].  Discussed patient presentation and ED findings.  He agrees admit patient to a telemetry bed. [MP]   1220 Patient has now decided that he no longer wants to be admitted.  Discussed risks of leaving, especially given that he is on supplemental oxygen.  He verbalizes understanding and still wishes to leave AGAINST MEDICAL ADVICE.  Will prescribe the patient Tamiflu.  Discussed ED return precautions.  Patient is leaving AGAINST MEDICAL ADVICE. [MP]      ED Course User Index  [AR] Stephanie Matt MD  [MP] Katie Ramires PA-C             AS OF 12:27 EST VITALS:    BP - 143/88  HR - 81  TEMP - 99.2 °F (37.3 °C) (Oral)  O2 SATS - 92%    COMPLEXITY OF CARE  The patient requires admission.      DIAGNOSIS  Final diagnoses:   Acute on chronic respiratory failure with hypoxia   Influenza A   Bronchopneumonia         DISPOSITION  ED Disposition       ED Disposition   AMA    Condition   --    Comment   Level of Care: Telemetry [5]  Diagnosis: Acute on chronic respiratory failure [[id number]]  Admitting Physician: RACHEL WYNN [[id number]]  Attending Physician: RACHEL WYNN [[id number]]  Isolate for COVID?: No [0]  Certification: I Certify That Inpatient Hospi marcell Services Are Medically Necessary For Greater Than 2 Midnights                  Please note that portions of this document were completed with a voice recognition program.    Note Disclaimer: At [facility], we believe that sharing information builds trust and better relationships. You are receiving this note  because you recently visited [facility]. It is possible you will see health information before a provider has talked with you about it. This kind of information can be easy to misunderstand. To help you fully understand what it means for your health, we urge you to discuss this note with your provider.     Katie Ramires PA-C  12/30/24 1132       Katie Ramires PA-C  12/30/24 1227      Electronically signed by Stephanie Matt MD at 12/30/24 1401       Licha Vázquez, RN at 12/30/24 0758          Patient to ed via private vehicle from home for cough, fever, SOA x1 month patient on 3L o2 baseline was 82% in triage. Chaned to 5L NC o2 90%    Electronically signed by Licha Vázquez, RN at 12/30/24 0795

## 2025-01-04 LAB
BACTERIA SPEC AEROBE CULT: NORMAL
BACTERIA SPEC AEROBE CULT: NORMAL

## 2025-01-15 NOTE — CASE MANAGEMENT/SOCIAL WORK
Case Management Discharge Note                Selected Continued Care - Discharged on 12/30/2024 Admission date: 12/30/2024 - Discharge disposition: Left Against Medical Advice      Destination    No services have been selected for the patient.                Durable Medical Equipment    No services have been selected for the patient.                Dialysis/Infusion    No services have been selected for the patient.                Home Medical Care    No services have been selected for the patient.                Therapy    No services have been selected for the patient.                Community Resources    No services have been selected for the patient.                Community & DME    No services have been selected for the patient.                         Final Discharge Disposition Code: 07 - left AMA

## 2025-01-17 ENCOUNTER — OFFICE VISIT (OUTPATIENT)
Dept: FAMILY MEDICINE CLINIC | Facility: CLINIC | Age: 50
End: 2025-01-17
Payer: COMMERCIAL

## 2025-01-17 DIAGNOSIS — R93.89 ABNORMAL COMPUTED TOMOGRAPHY ANGIOGRAPHY (CTA): ICD-10-CM

## 2025-01-17 DIAGNOSIS — J96.21 ACUTE AND CHRONIC RESPIRATORY FAILURE WITH HYPOXIA: Primary | ICD-10-CM

## 2025-01-17 DIAGNOSIS — R93.89 ABNORMAL CHEST X-RAY: ICD-10-CM

## 2025-01-17 DIAGNOSIS — R59.0 MEDIASTINAL LYMPHADENOPATHY: ICD-10-CM

## 2025-01-17 DIAGNOSIS — J09.X1 INFLUENZA A WITH PNEUMONIA: ICD-10-CM

## 2025-01-17 PROBLEM — M51.9 SCHMORL'S NODES: Status: ACTIVE | Noted: 2025-01-17

## 2025-01-17 PROCEDURE — 3078F DIAST BP <80 MM HG: CPT | Performed by: NURSE PRACTITIONER

## 2025-01-17 PROCEDURE — 1125F AMNT PAIN NOTED PAIN PRSNT: CPT | Performed by: NURSE PRACTITIONER

## 2025-01-17 PROCEDURE — 99214 OFFICE O/P EST MOD 30 MIN: CPT | Performed by: NURSE PRACTITIONER

## 2025-01-17 PROCEDURE — 3074F SYST BP LT 130 MM HG: CPT | Performed by: NURSE PRACTITIONER

## 2025-01-17 RX ORDER — NYSTATIN TOPICAL POWDER 100000 U/G
POWDER TOPICAL 2 TIMES DAILY
COMMUNITY
Start: 2025-01-09

## 2025-01-17 RX ORDER — LANCETS 28 GAUGE
1 EACH MISCELLANEOUS AS NEEDED
COMMUNITY
Start: 2025-01-09

## 2025-01-17 NOTE — ASSESSMENT & PLAN NOTE
Stable.  Continue continuous Oxygen at 3LPM via nasal cannula.  Engage in activities at a slower pace with frequent breaks because oxygen demand increases with exertion.  Referral to Pulmonology for eval/treat.    Orders:    Basic Metabolic Panel    CBC & Differential    Ambulatory Referral to Pulmonology

## 2025-01-17 NOTE — ASSESSMENT & PLAN NOTE
Improved after taking Tamiflu as directed.  Advised to take Mucinex 1200mg BID (OTC) PRN, as directed on package, for congestion. Advised not to take Mucinex D.  Take Mucinex with 8oz water and drink water to stay hydrated (stay w/in fluid restriction).  Referral to Pulmonology for eval/treat.    Orders:    Basic Metabolic Panel    CBC & Differential    Ambulatory Referral to Pulmonology

## 2025-01-17 NOTE — ASSESSMENT & PLAN NOTE
Abnormal finding on CTA - Chest PE dated 12/30/24.  Referral Pulmonology for eval/treat.  Orders:    Basic Metabolic Panel    CBC & Differential    Ambulatory Referral to Pulmonology

## 2025-01-17 NOTE — PROGRESS NOTES
Chief Complaint  Transitional Care Management    Subjective        HPI   History of Present Illness    Josh presents to [facility] for recent [facility] ED visit on 12/30/24 for Acute on Chronic Respiratory Failure with Hypoxia, Influenza A and Bronchopneumonia and left AMA:    Hospital Course:  Josh Hinojosa is a 49 y.o. male who presents to the ED c/o acute shortness of breath beyond his baseline.  Patient with chronic respiratory failure on 3 L of oxygen requiring more over the last few days.  Patient with productive cough fever and bodyaches including neck pain.  Patient denies any vomiting or diarrhea.  Patient denies any specific sick contacts.    Positive Influenza A test.  Negative Influenza B and COVID tests.     CTA- Chest PE dated 12/30/24 findings:    - Enlarged main pulmonary artery, measures 4.8 cm.   - Coronary Artery Atherosclerotic calcifications.  - Borderline Cardiomegaly.  - Mild mediastinal lymphadenopathy. For example, AP window lymph node, series 5, axial mage 57, measures 1.2 cm, unchanged. For example, right paratracheal/4R lymph node, series 5, axial mage 49, measures 2.3 cm, previously 1.5 cm. For example, right paratracheal lymph node, series 5, axial mage 56, measures 1.3 cm, previously 1.1 cm. Mildly prominent hilar lymph nodes. Lung/pleura:   - Ill-defined bronchovascular opacities in the right upper lobe ill-defined right middle lobe opacity, with volume loss.    - Small amount of posterior dependent and bibasilar subsegmental atelectasis.  - Suspect some subsegmental atelectasis in the left upper lobe.   - Suspect hepatic steatosis. Cholelithiasis. Colonic diverticulosis.    Small fluid attenuating cyst seen in the right mid kidney.   - Tortuous and ectatic appearance to the infrarenal abdominal aorta, incompletely imaged.   - Trace perisplenic fluid.   - Multilevel Schmorl's nodes.    XR Chest dated 12/30/24:  - Cardiomegaly with vascular  congestion   - Small ill-defined right midlung and bibasilar opacities. Could be areas of subsegmental atelectasis or multifocal pneumonia.         Medical Decision Making:      ED Course as of 12/30/24 1805   Mon Dec 30, 2024   0819 EKG ER MD interpretation   Time: 8: 15  Rhythm and rate: Normal sinus rhythm at a rate of 86  Axis: Normal  P waves: Normal except for left atrial enlargement  QRS complexes: Right bundle branch block with ST segment and T wave changes throughout precordial leads and widened QRS duration of 165 ms  Comparison EKG is from March 26, 2024 and had similar findings including ST segment changes [AR]   0837 WBC: 4.20 [MP]   0837 Hemoglobin: 16.9 [MP]   1049 Influenza A H1 2009 PCR(!): Detected [MP]   1126 I spoke with Dr. Wynn with [facility].  Discussed patient presentation and ED findings.  He agrees admit patient to a telemetry bed. [MP]   1220 Patient has now decided that he no longer wants to be admitted.  Discussed risks of leaving, especially given that he is on supplemental oxygen.  He verbalizes understanding and still wishes to leave AGAINST MEDICAL ADVICE.  Will prescribe the patient Tamiflu.  Discussed ED return precautions.  Patient is leaving AGAINST MEDICAL ADVICE. [MP]     Patient left ED against medical advise.  He was given Tamiflu for treatment of Influenza A.    Copied text on this note has been reviewed and revised by me on 1/17/25.    He was recently discharged from the emergency room after declining admission, despite the medical team's recommendation. He was prescribed Tamiflu, which he has been taking as directed. Additionally, he has been self-medicating with over-the-counter Mucinex, although he is uncertain of the specific variant (Mucinex D, DM or plain Mucinex). He reports feeling generally well but experiences fatigue and poor sleep quality, even when not ill. He also reports mild congestion and occasional coughing, producing sputum that varies in color from clear to  "yellow. His shortness of breath has slightly increased compared to his baseline, but he does not report any wheezing. He experienced fever and chills during his hospital stay, but these symptoms have since resolved. He is currently on 3 liters of continuous oxygen, which is his usual requirement. He is unsure if he is allowed to increase his oxygen intake to 4 liters. He has not had a recent consultation with Dr. Dougie Espinosa, pulmonologist.    He has a history of pulmonary hypertension and underwent stent placement during his last hospital visit. He maintains a high-water intake and incorporates vegetables into every meal. His bowel movements are regular, occurring each morning.    MEDICATIONS  Current: atorvastatin, Mucinex, Tamiflu      Acute and Chronic Respiratory Failure with Hypoxia - he is currently on continuous Oxygen at3 lpm via nasal cannula.    Influenza A with possible Pneumonia      Objective   Vital Signs:   Vitals:    01/17/25 0942 01/17/25 1013   BP: 118/78    BP Location: Left arm    Patient Position: Sitting    Cuff Size: Large Adult    Pulse: 68 78   Resp:  16   Temp:  98 °F (36.7 °C)   TempSrc:  Oral   SpO2: (!) 88%  Comment: 3l of oxygen through nasal opening 92%  Comment: 92-93% on Continuous Oxygen at 3LPM per nasal cannula   Weight: (!) 163 kg (359 lb 9.6 oz)    Height: 175.3 cm (69.02\")             1/17/2025     9:48 AM   PHQ-2/PHQ-9 Depression Screening   Little interest or pleasure in doing things Not at all   Feeling down, depressed, or hopeless Not at all   How difficult have these problems made it for you to do your work, take care of things at home, or get along with other people? Not difficult at all        Physical Exam  Vitals and nursing note reviewed.   Constitutional:       General: He is not in acute distress.     Appearance: He is not toxic-appearing or diaphoretic.      Comments: Chronically ill appearing, severe obesity   HENT:      Head: Normocephalic and atraumatic.      " Salivary Glands: Right salivary gland is not diffusely enlarged or tender. Left salivary gland is not diffusely enlarged or tender.      Right Ear: Tympanic membrane normal. There is no impacted cerumen. No mastoid tenderness.      Left Ear: Tympanic membrane normal. There is no impacted cerumen. No mastoid tenderness.   Cardiovascular:      Rate and Rhythm: Normal rate and regular rhythm.      Heart sounds: Normal heart sounds. No murmur heard.  Pulmonary:      Effort: Pulmonary effort is normal. No respiratory distress.      Breath sounds: Normal breath sounds. No wheezing, rhonchi or rales.      Comments: No cough present during exam  Musculoskeletal:      Cervical back: Neck supple. No tenderness.      Right lower leg: No edema.      Left lower leg: No edema.   Lymphadenopathy:      Cervical: No cervical adenopathy.   Neurological:      Mental Status: He is alert.          Result Review :     The following data was reviewed by: DORIS Morel on 01/17/2025:      CBC & Differential (12/30/2024 08:16)  Comprehensive Metabolic Panel (12/30/2024 08:16)  BNP (12/30/2024 08:16)  High Sensitivity Troponin T (12/30/2024 08:16)  Procalcitonin (12/30/2024 08:16)  Lactic Acid, Plasma (12/30/2024 08:16)  Respiratory Panel PCR w/COVID-19(SARS-CoV-2) ABEL/DEBBY/FER/PAD/[facility] In-House, NP Swab in UTM/VTM, 2 HR TAT - Swab, Nasopharynx (12/30/2024 08:18)  Blood Culture - Blood, Arm, Right (12/30/2024 08:30)  Blood Culture - Blood, Arm, Left (12/30/2024 08:39)  High Sensitivity Troponin T 1Hr (12/30/2024 10:19)  XR Chest 1 View (12/30/2024 08:45)  CT Angiogram Chest Pulmonary Embolism (12/30/2024 10:02)     Assessment and Plan    Assessment & Plan  Acute and chronic respiratory failure with hypoxia  Stable.  Continue continuous Oxygen at 3LPM via nasal cannula.  Engage in activities at a slower pace with frequent breaks because oxygen demand increases with exertion.  Referral to Pulmonology for eval/treat.    Orders:     Basic Metabolic Panel    CBC & Differential    Ambulatory Referral to Pulmonology    Influenza A with pneumonia  Improved after taking Tamiflu as directed.  Advised to take Mucinex 1200mg BID (OTC) PRN, as directed on package, for congestion. Advised not to take Mucinex D.  Take Mucinex with 8oz water and drink water to stay hydrated (stay w/in fluid restriction).  Referral to Pulmonology for eval/treat.    Orders:    Basic Metabolic Panel    CBC & Differential    Ambulatory Referral to Pulmonology    Mediastinal lymphadenopathy  Abnormal finding on CTA - Chest PE dated 12/30/24.  Referral Pulmonology for eval/treat.  Orders:    Basic Metabolic Panel    CBC & Differential    Ambulatory Referral to Pulmonology    Abnormal computed tomography angiography (CTA)    Orders:    Ambulatory Referral to Cardiology    Abnormal chest x-ray    Orders:    Ambulatory Referral to Cardiology       I spent 30 minutes caring for Josh on this date of service. This time includes time spent by me in the following activities:preparing for the visit, reviewing tests, obtaining and/or reviewing a separately obtained history, performing a medically appropriate examination and/or evaluation , counseling and educating the patient/family/caregiver, ordering medications, tests, or procedures, referring and communicating with other health care professionals , documenting information in the medical record, and independently interpreting results and communicating that information with the patient/family/caregiver  Follow Up   Return in about 1 month (around 2/17/2025) for Next scheduled follow up - Chronic Care.  Patient was given instructions and counseling regarding his condition or for health maintenance advice. Please see specific information pulled into the AVS if appropriate.

## 2025-01-18 LAB
BASOPHILS # BLD AUTO: 0.08 10*3/MM3 (ref 0–0.2)
BASOPHILS NFR BLD AUTO: 1.1 % (ref 0–1.5)
BUN SERPL-MCNC: 23 MG/DL (ref 6–20)
BUN/CREAT SERPL: 22.8 (ref 7–25)
CALCIUM SERPL-MCNC: 9.9 MG/DL (ref 8.6–10.5)
CHLORIDE SERPL-SCNC: 96 MMOL/L (ref 98–107)
CO2 SERPL-SCNC: 31.2 MMOL/L (ref 22–29)
CREAT SERPL-MCNC: 1.01 MG/DL (ref 0.76–1.27)
EGFRCR SERPLBLD CKD-EPI 2021: 91.7 ML/MIN/1.73
EOSINOPHIL # BLD AUTO: 0.2 10*3/MM3 (ref 0–0.4)
EOSINOPHIL NFR BLD AUTO: 2.7 % (ref 0.3–6.2)
ERYTHROCYTE [DISTWIDTH] IN BLOOD BY AUTOMATED COUNT: 13 % (ref 12.3–15.4)
GLUCOSE SERPL-MCNC: 118 MG/DL (ref 65–99)
HCT VFR BLD AUTO: 51.8 % (ref 37.5–51)
HGB BLD-MCNC: 19.1 G/DL (ref 13–17.7)
IMM GRANULOCYTES # BLD AUTO: 0.03 10*3/MM3 (ref 0–0.05)
IMM GRANULOCYTES NFR BLD AUTO: 0.4 % (ref 0–0.5)
LYMPHOCYTES # BLD AUTO: 2.01 10*3/MM3 (ref 0.7–3.1)
LYMPHOCYTES NFR BLD AUTO: 26.9 % (ref 19.6–45.3)
MCH RBC QN AUTO: 29.6 PG (ref 26.6–33)
MCHC RBC AUTO-ENTMCNC: 32.6 G/DL (ref 31.5–35.7)
MCV RBC AUTO: 90.7 FL (ref 79–97)
MONOCYTES # BLD AUTO: 0.68 10*3/MM3 (ref 0.1–0.9)
MONOCYTES NFR BLD AUTO: 9.1 % (ref 5–12)
NEUTROPHILS # BLD AUTO: 4.48 10*3/MM3 (ref 1.7–7)
NEUTROPHILS NFR BLD AUTO: 59.8 % (ref 42.7–76)
NRBC BLD AUTO-RTO: 0 /100 WBC (ref 0–0.2)
PLATELET # BLD AUTO: 268 10*3/MM3 (ref 140–450)
POTASSIUM SERPL-SCNC: 5.2 MMOL/L (ref 3.5–5.2)
RBC # BLD AUTO: 6.45 10*6/MM3 (ref 4.14–5.8)
SODIUM SERPL-SCNC: 138 MMOL/L (ref 136–145)
WBC # BLD AUTO: 7.48 10*3/MM3 (ref 3.4–10.8)

## 2025-01-21 ENCOUNTER — TELEPHONE (OUTPATIENT)
Dept: FAMILY MEDICINE CLINIC | Facility: CLINIC | Age: 50
End: 2025-01-21
Payer: COMMERCIAL

## 2025-01-21 NOTE — TELEPHONE ENCOUNTER
LVM for pt informing them of lab results.     ----- Message from Inocencia Rojas sent at 1/21/2025 12:22 AM EST -----  Please call patient and advise him of lab results as follows:    Glucose blood level is elevated but has improved from prior lab 3 weeks ago.  Recommend decreasing food/drink high in sugar/carbs to help reduce glucose levels and decrease your A1c.    Kidney blood level is slightly elevated.  Recommend increasing water and decreasing caffeine but stay within your fluid restriction range..    CO2 blood level is improving.  Recommend pacing your activities based on breathing status as tolerated.    Red blood cell, hemoglobin and hematocrit are all slightly elevated.  Will continue to monitor blood levels and re-collect CBC at next visit on 2/17/25.

## 2025-01-25 VITALS
SYSTOLIC BLOOD PRESSURE: 118 MMHG | DIASTOLIC BLOOD PRESSURE: 78 MMHG | RESPIRATION RATE: 16 BRPM | TEMPERATURE: 98 F | WEIGHT: 315 LBS | HEART RATE: 78 BPM | OXYGEN SATURATION: 92 % | BODY MASS INDEX: 46.65 KG/M2 | HEIGHT: 69 IN

## 2025-01-30 NOTE — THERAPY TREATMENT NOTE
Patient Name: Josh Hinojosa  : 1975    MRN: [id number]                              Today's Date: 3/11/2024       Admit Date: 3/7/2024    Visit Dx:     ICD-10-CM ICD-9-CM   1. Acute hypoxemic respiratory failure  J96.01 518.81   2. Fever in adult  R50.9 780.60   3. Acute metabolic encephalopathy  G93.41 348.31   4. Acute pulmonary edema  J81.0 518.4   5. Pneumonia of left lower lobe due to infectious organism  J18.9 486   6. Hypervolemia, unspecified hypervolemia type  E87.70 276.69     Patient Active Problem List   Diagnosis    Acute hypoxemic respiratory failure     History reviewed. No pertinent past medical history.  History reviewed. No pertinent surgical history.   General Information       Row Name 24 1532          Physical Therapy Time and Intention    Document Type therapy note (daily note)  -MS (r) MH (t) MS (c)     Mode of Treatment physical therapy;individual therapy  -MS (r) MH (t) MS (c)       Row Name 24 1532          General Information    Patient Profile Reviewed yes  -MS (r) MH (t) MS (c)     Existing Precautions/Restrictions fall;oxygen therapy device and L/min  -MS (r) MH (t) MS (c)       Row Name 24 153          Cognition    Orientation Status (Cognition) oriented x 4  -MS (r) MH (t) MS (c)       Row Name 24 1532          Safety Issues, Functional Mobility    Safety Issues Affecting Function (Mobility) sequencing abilities;safety precaution awareness;safety precautions follow-through/compliance;impulsivity  -MS (r) MH (t) MS (c)     Impairments Affecting Function (Mobility) endurance/activity tolerance;shortness of breath;strength  -MS (r) MH (t) MS (c)     Comment, Safety Issues/Impairments (Mobility) Non skid socks donned. Pt was very impulsive this date.  -MS (r) MH (t) MS (c)               User Key  (r) = Recorded By, (t) = Taken By, (c) = Cosigned By      Initials Name Provider Type    Katie Coughlin, PT Physical Therapist    Jose Manzano, JUAQUIN  Student PT Student                   Mobility       Row Name 03/11/24 1533          Bed Mobility    Comment, (Bed Mobility) NT- sitting UC upon PT arrival.  -MS (r) MH (t) MS (c)       Row Name 03/11/24 1533          Bed-Chair Transfer    Bed-Chair [place] (Transfers) not tested  -MS (r) MH (t) MS (c)       Row Name 03/11/24 1533          Sit-Stand Transfer    Sit-Stand [place] (Transfers) standby assist;nonverbal cues (demo/gesture);verbal cues  -MS (r) MH (t) MS (c)     Comment, (Sit-Stand Transfer) No AD used. No LOB noted.  -MS (r) MH (t) MS (c)       Row Name 03/11/24 1533          Gait/Stairs (Locomotion)    [place] Level (Gait) standby assist;nonverbal cues (demo/gesture);verbal cues  -MS (r) MH (t) MS (c)     Patient was able to Ambulate yes  -MS (r) MH (t) MS (c)     Distance in Feet (Gait) 60 ft, 10 MIPs x 4 trials  -MS (r) MH (t) MS (c)     Deviations/Abnormal Patterns (Gait) colten decreased;gait speed decreased  -MS (r) MH (t) MS (c)     Bilateral Gait Deviations forward flexed posture  -MS (r) MH (t) MS (c)     Comment, (Gait/Stairs) Pt's oxygen desaturated to 86%, requiring a standing rest breaks. O2 immediately improved to 92% upon rest, ambulation continued. No LOB or veering noted this date.  -MS (r) MH (t) MS (c)               User Key  (r) = Recorded By, (t) = Taken By, (c) = Cosigned By      Initials Name Provider Type    MS SmithaKatie, PT Physical Therapist     Jose Will, PT Student PT Student                   Obj/Interventions       Row Name 03/11/24 1535          Motor Skills    Therapeutic Exercise --  1 x 10 LAQ, 10 MIPs x 4  -MS (r) MH (t) MS (c)       Row Name 03/11/24 1535          Balance    Balance Assessment sitting static balance;sit to stand dynamic balance;standing static balance;standing dynamic balance  -MS (r) MH (t) MS (c)     Static Sitting Balance supervision  -MS (r) MH (t) MS (c)     Position, Sitting Balance sitting in chair  -MS (r) MH  (t) MS (c)     Sit to Stand Dynamic Balance non-verbal cues (demo/gesture);verbal cues;standby assist  -MS (r) MH (t) MS (c)     Static Standing Balance standby assist;non-verbal cues (demo/gesture);verbal cues  -MS (r) MH (t) MS (c)     Dynamic Standing Balance verbal cues;non-verbal cues (demo/gesture);standby assist  -MS (r) MH (t) MS (c)     Balance Interventions sitting;standing;sit to stand;static;dynamic  -MS (r) MH (t) MS (c)     Comment, Balance No LOB or veering noted this date.  -MS (r) MH (t) MS (c)               User Key  (r) = Recorded By, (t) = Taken By, (c) = Cosigned By      Initials Name Provider Type    MS Bone Katie A, PT Physical Therapist    Jose Manzano, PT Student PT Student                   Goals/Plan    No documentation.                  Clinical Impression       Row Name 03/11/24 1536          Pain    Pretreatment Pain Rating 0/10 - no pain  -MS (r) MH (t) MS (c)     Posttreatment Pain Rating 0/10 - no pain  -MS (r) MH (t) MS (c)     Pre/Posttreatment Pain Comment No pain reported this date.  -MS (r) MH (t) MS (c)       Row Name 03/11/24 7176          Plan of Care Review    Progress improving  -MS (r) MH (t) MS (c)     Outcome Evaluation Pt agreeable to PT session this treatment session. Pt performed LE exercises while sitting edge of chair statically with supervision. Pt then required SBA for STS transfer and during ambulation. Pt was able to ambulate around 60 ft with forward and backward walking. Pt was encouraged to take a standing rest break due to drop in O2 to 86%; O2 immediatley recovered after utilizing PLB technique. Pt then performed standing MIPs x 4 with SBA, no LOB or veering noted. Pt was mildly impulsive throughout session, insisiting that he must take heart leads off. Nsg aware and notified. Pt vitals WNL. Pt left in chair upon end of session with call light in reach.  -MS (r) MH (t) MS (c)       Row Name 03/11/24 6560          Therapy Assessment/Plan (PT)     Therapy Frequency (PT) 3 times/wk  -MS (r) MH (t) MS (c)       Row Name 03/11/24 1536          Vital Signs    Pre Systolic BP Rehab 126  -MS (r) MH (t) MS (c)     Pre Treatment Diastolic BP 69  -MS (r) MH (t) MS (c)     Pre SpO2 (%) 92  -MS (r) MH (t) MS (c)     O2 Delivery Pre Treatment nasal cannula  12 L humidified NC  -MS (r) MH (t) MS (c)     Intra SpO2 (%) 86  -MS (r) MH (t) MS (c)     O2 Delivery Intra Treatment nasal cannula  12 L humidified NC  -MS (r) MH (t) MS (c)     Post SpO2 (%) 93  -MS (r) MH (t) MS (c)     O2 Delivery Post Treatment nasal cannula  12 L humidified NC  -MS (r) MH (t) MS (c)     Pre Patient Position Sitting  -MS (r) MH (t) MS (c)     Intra Patient Position Standing  -MS (r) MH (t) MS (c)     Post Patient Position Sitting  -MS (r) MH (t) MS (c)     Rest Breaks  2  -MS (r) MH (t) MS (c)       Row Name 03/11/24 1536          Positioning and Restraints    Pre-Treatment Position sitting in chair/recliner  -MS (r) MH (t) MS (c)     Post Treatment Position chair  -MS (r) MH (t) MS (c)     In Chair notified nsg;call light within reach;exit alarm on;encouraged to call for assist;sitting  -MS (r) MH (t) MS (c)               User Key  (r) = Recorded By, (t) = Taken By, (c) = Cosigned By      Initials Name Provider Type    Katie Coughlin, PT Physical Therapist     Jose Will, PT Student PT Student                   Outcome Measures       Row Name 03/11/24 1542          How much help from another person do you currently need...    Turning from your back to your side while in flat bed without using bedrails? 4  -MS (r) MH (t) MS (c)     Moving from lying on back to sitting on the side of a flat bed without bedrails? 4  -MS (r) MH (t) MS (c)     Moving to and from a bed to a chair (including a wheelchair)? 4  -MS (r) MH (t) MS (c)     Standing up from a chair using your arms (e.g., wheelchair, bedside chair)? 4  -MS (r) MH (t) MS (c)     Climbing 3-5 steps with a railing? 3  -MS  (r) MH (t) MS (c)     To walk in hospital room? 4  -MS (r) MH (t) MS (c)     AM-PAC 6 Clicks Score (PT) 23  -MS (r) MH (t)     Highest Level of Mobility Goal 7 --> Walk 25 feet or more  -MS (r) MH (t)       Row Name 03/11/24 1542          Functional Assessment    Outcome Measure Options AM-PAC 6 Clicks Basic Mobility (PT)  -MS (r) MH (t) MS (c)               User Key  (r) = Recorded By, (t) = Taken By, (c) = Cosigned By      Initials Name Provider Type    MS SmithaKatie, PT Physical Therapist     Jose Will, PT Student PT Student                                 Physical Therapy Education       Title: PT OT SLP Therapies (Done)       Topic: Physical Therapy (Done)       Point: Mobility training (Done)       Learning Progress Summary             Patient Acceptance, E,TB, VU by  at 3/11/2024 1543    Acceptance, E, VU by  at 3/10/2024 1757    Acceptance, E, VU by  at 3/9/2024 1738    Acceptance, E,TB, VU by MS at 3/9/2024 1428                         Point: Home exercise program (Done)       Learning Progress Summary             Patient Acceptance, E,TB, VU by  at 3/11/2024 1543    Acceptance, E, VU by  at 3/10/2024 1757    Acceptance, E, VU by  at 3/9/2024 1738    Acceptance, E,TB, VU by MS at 3/9/2024 1428                         Point: Body mechanics (Done)       Learning Progress Summary             Patient Acceptance, E,TB, VU by  at 3/11/2024 1543    Acceptance, E, VU by  at 3/10/2024 1757    Acceptance, E, VU by  at 3/9/2024 1738    Acceptance, E,TB, VU by MS at 3/9/2024 1428                         Point: Precautions (Done)       Learning Progress Summary             Patient Acceptance, E,TB, VU by  at 3/11/2024 1543    Acceptance, E, VU by  at 3/10/2024 1757    Acceptance, E, VU by  at 3/9/2024 1738    Acceptance, E,TB, VU by MS at 3/9/2024 1428                                         User Key       Initials Effective Dates Name Provider Type Discipline    MS 06/16/21 -   Katie Bone, PT Physical Therapist PT     12/20/21 -  Sophie Garsia RN Registered Nurse Nurse     01/24/24 -  Jose Will, PT Student PT Student PT                  PT Recommendation and Plan     Progress: improving  Outcome Evaluation: Pt agreeable to PT session this treatment session. Pt performed LE exercises while sitting edge of chair statically with supervision. Pt then required SBA for STS transfer and during ambulation. Pt was able to ambulate around 60 ft with forward and backward walking. Pt was encouraged to take a standing rest break due to drop in O2 to 86%; O2 immediatley recovered after utilizing PLB technique. Pt then performed standing MIPs x 4 with SBA, no LOB or veering noted. Pt was mildly impulsive throughout session, insisiting that he must take heart leads off. Nsg aware and notified. Pt vitals WNL. Pt left in chair upon end of session with call light in reach.     Time Calculation:         PT Charges       Row Name 03/11/24 1543             Time Calculation    Start Time 1344  -MS (r) MH (t) MS (c)      Stop Time 1358  -MS (r) MH (t) MS (c)      Time Calculation (min) 14 min  -MS (r) MH (t)      PT Received On 03/11/24  -MS (r) MH (t) MS (c)      PT - Next Appointment 03/13/24  -MS (r) MH (t) MS (c)         Time Calculation- PT    Total Timed Code Minutes- PT 14 minute(s)  -MS (r) MH (t) MS (c)         Timed Charges    75531 - PT Therapeutic Exercise Minutes 5  -MS (r) MH (t) MS (c)      86013 - PT Therapeutic Activity Minutes 9  -MS (r) MH (t) MS (c)         Total Minutes    Timed Charges Total Minutes 14  -MS (r) MH (t)       Total Minutes 14  -MS (r) MH (t)                User Key  (r) = Recorded By, (t) = Taken By, (c) = Cosigned By      Initials Name Provider Type    Katie Coughlin, PT Physical Therapist     Jose Will, PT Student PT Student                  Therapy Charges for Today       Code Description Service Date Service Provider Modifiers Qty     [id number]  PT THERAPEUTIC ACT EA 15 MIN 3/11/2024 Jose Will, PT Student GP 1            PT G-Codes  Outcome Measure Options: AM-PAC 6 Clicks Basic Mobility (PT)  AM-PAC 6 Clicks Score (PT): 23  PT Discharge Summary  Anticipated Discharge Disposition (PT): home    Jose Will PT Student  3/11/2024     community/leisure

## 2025-02-05 ENCOUNTER — TELEPHONE (OUTPATIENT)
Dept: CARDIOLOGY | Facility: CLINIC | Age: 50
End: 2025-02-05
Payer: COMMERCIAL

## 2025-02-05 NOTE — TELEPHONE ENCOUNTER
Caller: Josh Hinojosa    Relationship: Self    Best call back number: [phone]    What is the best time to reach you: ANYTIME AND YOU CAN LEAVE VM    Who are you requesting to speak with (clinical staff, provider,  specific staff member): CLINICAL        What was the call regarding: PT LAST SEEN IN OFFICE 04/2024 AND HIS PCP LOVELY BROWN SENT REFERRAL TO BE SEEN FOR ABNORMAL CTA AND ABNORMAL CHEST X RAY.  NEXT APPT NOT UNTIL APRIL.  DOES HE NEED TO BE SEEN SOONER?  PLEASE CALL PT TO SCHEDULE APPT

## 2025-02-10 ENCOUNTER — OFFICE VISIT (OUTPATIENT)
Dept: CARDIOLOGY | Facility: CLINIC | Age: 50
End: 2025-02-10
Payer: COMMERCIAL

## 2025-02-10 VITALS
SYSTOLIC BLOOD PRESSURE: 126 MMHG | BODY MASS INDEX: 46.65 KG/M2 | OXYGEN SATURATION: 93 % | HEART RATE: 86 BPM | WEIGHT: 315 LBS | HEIGHT: 69 IN | DIASTOLIC BLOOD PRESSURE: 80 MMHG

## 2025-02-10 DIAGNOSIS — R07.2 PRECORDIAL PAIN: Primary | ICD-10-CM

## 2025-02-10 DIAGNOSIS — I27.20 PULMONARY HYPERTENSION: ICD-10-CM

## 2025-02-10 DIAGNOSIS — I50.33 ACUTE ON CHRONIC DIASTOLIC CHF (CONGESTIVE HEART FAILURE): ICD-10-CM

## 2025-02-10 PROCEDURE — 3079F DIAST BP 80-89 MM HG: CPT | Performed by: INTERNAL MEDICINE

## 2025-02-10 PROCEDURE — 99214 OFFICE O/P EST MOD 30 MIN: CPT | Performed by: INTERNAL MEDICINE

## 2025-02-10 PROCEDURE — 3074F SYST BP LT 130 MM HG: CPT | Performed by: INTERNAL MEDICINE

## 2025-02-10 PROCEDURE — 1159F MED LIST DOCD IN RCRD: CPT | Performed by: INTERNAL MEDICINE

## 2025-02-10 PROCEDURE — 1160F RVW MEDS BY RX/DR IN RCRD: CPT | Performed by: INTERNAL MEDICINE

## 2025-02-10 NOTE — H&P (VIEW-ONLY)
[facility]      Patient Name: Josh Hinojosa  :1975  Age: 49 y.o.  Encounter Provider:  Hermann Torres Jr, MD      Chief Complaint:   Chief Complaint   Patient presents with    Acute on chronic diastolic congestive heart failure         Congestive Heart Failure  Associated symptoms include shortness of breath. Pertinent negatives include no abdominal pain, chest pain, near-syncope or palpitations.     Josh Hinojosa is a 49 y.o. male with morbid obesity, COPD who presents for hospital follow-up.      Last clinic visit note: He was recently diagnosed and hospitalized with metapneumovirus pneumonia.  He was back in the hospital with acute exacerbation of COPD versus acute on chronic diastolic heart failure.  He did diurese well and was sent home with supplemental oxygen.  This is the third hospitalization in the past 5 years for which she is required oxygen afterward.  States his weight has been quite labile and a few years ago he was able to lose 100 pounds but has gained all of that back.  He did quit smoking about a month ago.  Fair functional capacity with the use of oxygen and no chest pain.  No orthopnea, PND or edema but he feels that he is starting to gain weight again.  Family history of coronary artery disease.  He denies alcohol and admits to marijuana use.  No cardiac complaints at time of interview.    Recently in the hospital for acute on chronic hypoxic respiratory failure.  This is likely a combination of COPD exacerbation with may be some element of heart failure.  He certainly has some aspect of obesity hypoventilation syndrome.  CTA failed to show any pulmonary embolism however PA diameter is 4.8 cm.  Heart rate and blood pressure well-controlled today in clinic.  He remains on 3 L oxygen since discharge from [facility] in 2024.  He unfortunately continues to smoke 5 cigarettes/day.  He has occasional chest pain.  No orthopnea or PND.  Edema is much improved.    The  "following portions of the patient's history were reviewed and updated as appropriate: allergies, current medications, past family history, past medical history, past social history, past surgical history and problem list.      Review of Systems   Constitutional: Negative for chills and fever.   HENT:  Negative for hoarse voice and sore throat.    Eyes:  Negative for double vision and photophobia.   Cardiovascular:  Positive for dyspnea on exertion. Negative for chest pain, leg swelling, near-syncope, orthopnea, palpitations, paroxysmal nocturnal dyspnea and syncope.   Respiratory:  Positive for shortness of breath. Negative for cough and wheezing.    Skin:  Negative for poor wound healing and rash.   Musculoskeletal:  Negative for arthritis and joint swelling.   Gastrointestinal:  Negative for bloating, abdominal pain, hematemesis and hematochezia.   Neurological:  Negative for dizziness and focal weakness.   Psychiatric/Behavioral:  Negative for depression and suicidal ideas.        OBJECTIVE:   Vital Signs  Vitals:    02/10/25 1251   BP: 126/80   Pulse: 86   SpO2: 93%     Estimated body mass index is 53.1 kg/m² as calculated from the following:    Height as of this encounter: 175.3 cm (69\").    Weight as of this encounter: 163 kg (359 lb 9.6 oz).    Vitals reviewed.   Constitutional:       Appearance: Not in distress. Chronically ill-appearing.   Neck:      Comments: JVD difficult to assess given body habitus  Pulmonary:      Breath sounds: No wheezing. No rhonchi. No rales.      Comments: Poor air entry bilateral zones with no adventitious breath sounds appreciated  Chest:      Chest wall: Not tender to palpatation.   Cardiovascular:      PMI at left midclavicular line. Normal rate. Regular rhythm. Normal S1. Normal S2.       Murmurs: There is no murmur.      No gallop.  No click. No rub.   Pulses:     Intact distal pulses.   Edema:     Thigh: bilateral trace edema of the thigh.     Pretibial: bilateral trace " edema of the pretibial area.     Ankle: bilateral trace edema of the ankle.     Feet: bilateral trace edema of the feet.  Abdominal:      General: Bowel sounds are normal.      Palpations: Abdomen is soft.      Tenderness: There is no abdominal tenderness.   Musculoskeletal: Normal range of motion.         General: No tenderness. Skin:     General: Skin is warm and dry.   Neurological:      General: No focal deficit present.      Mental Status: Alert and oriented to person, place and time.       Procedures    Lipid Panel          4/17/2024    09:38 6/17/2024    10:18 9/17/2024    10:38   Lipid Panel   Total Cholesterol 203  137  116    Triglycerides 122  117  122    HDL Cholesterol 40  32  31    VLDL Cholesterol 22  21  22    LDL Cholesterol  141  84  63         BUN   Date Value Ref Range Status   01/17/2025 23 (H) 6 - 20 mg/dL Final   12/30/2024 16 6 - 20 mg/dL Final     Creatinine   Date Value Ref Range Status   01/17/2025 1.01 0.76 - 1.27 mg/dL Final   12/30/2024 0.93 0.76 - 1.27 mg/dL Final     Potassium   Date Value Ref Range Status   01/17/2025 5.2 3.5 - 5.2 mmol/L Final   12/30/2024 4.1 3.5 - 5.2 mmol/L Final   12/23/2021 3.5 (L) 3.7 - 5.0 mmol/L Final     ALT (SGPT)   Date Value Ref Range Status   12/30/2024 15 1 - 41 U/L Final     AST (SGOT)   Date Value Ref Range Status   12/30/2024 19 1 - 40 U/L Final           ASSESSMENT:     48-year-old male with morbid obesity, COPD on home oxygen presents for acute on chronic diastolic heart failure      PLAN OF CARE:     Chronic diastolic heart failure -he is fairly euvolemic today but we will need to know how much LV pressures are contributing to what is apparently significant pulmonary hypertension by the imaging features on CTA.  We are going to plan for left and right heart catheterization given his recent complaints of chest pain.  COPD -Home oxygen dependence.  Details were discussed with Dr. Dougie Lang and we will use the right heart cath plus or minus  vasodilation results for further treatment recommendations.  History of tobacco abuse -encourage remained abstinence from nicotine products.  Morbid obesity -weight loss and exercise recommended  Hypertension -seemingly well-controlled at this time.  Mild concentric hypertrophy noted on echocardiogram.  Abnormal EKG -chronic right bundle branch block with no acute pathology on echocardiogram last month    Return to clinic 6 months           Discharge Medications            Accurate as of February 10, 2025 12:55 PM. If you have any questions, ask your nurse or doctor.                Continue These Medications        Instructions Start Date   atorvastatin 10 MG tablet  Commonly known as: LIPITOR   10 mg, Oral, Nightly      freestyle lancets   1 each, As Needed      ipratropium-albuterol 0.5-2.5 mg/3 ml nebulizer  Commonly known as: DUO-NEB   3 mL, Every 4 Hours PRN      Klayesta [id number] UNIT/GM powder  Generic drug: nystatin   2 Times Daily      lisinopril 10 MG tablet  Commonly known as: PRINIVIL,ZESTRIL   10 mg, Oral, Every 24 Hours Scheduled      metFORMIN 500 MG tablet  Commonly known as: GLUCOPHAGE   500 mg, Oral, Daily With Breakfast      torsemide 20 MG tablet  Commonly known as: DEMADEX   20 mg, Oral, Daily      Trelegy Ellipta 200-62.5-25 MCG/ACT inhaler  Generic drug: Fluticasone-Umeclidin-Vilant   1 puff, Inhalation, Daily - RT               Thank you for allowing me to participate in the care of your patient,      Sincerely,   Hermann Torres MD  [facility]  02/10/25  12:55 EST

## 2025-02-10 NOTE — PROGRESS NOTES
[facility]      Patient Name: Josh Hinojosa  :1975  Age: 49 y.o.  Encounter Provider:  Hermann Torres Jr, MD      Chief Complaint:   Chief Complaint   Patient presents with    Acute on chronic diastolic congestive heart failure         Congestive Heart Failure  Associated symptoms include shortness of breath. Pertinent negatives include no abdominal pain, chest pain, near-syncope or palpitations.     Josh Hinojosa is a 49 y.o. male with morbid obesity, COPD who presents for hospital follow-up.      Last clinic visit note: He was recently diagnosed and hospitalized with metapneumovirus pneumonia.  He was back in the hospital with acute exacerbation of COPD versus acute on chronic diastolic heart failure.  He did diurese well and was sent home with supplemental oxygen.  This is the third hospitalization in the past 5 years for which she is required oxygen afterward.  States his weight has been quite labile and a few years ago he was able to lose 100 pounds but has gained all of that back.  He did quit smoking about a month ago.  Fair functional capacity with the use of oxygen and no chest pain.  No orthopnea, PND or edema but he feels that he is starting to gain weight again.  Family history of coronary artery disease.  He denies alcohol and admits to marijuana use.  No cardiac complaints at time of interview.    Recently in the hospital for acute on chronic hypoxic respiratory failure.  This is likely a combination of COPD exacerbation with may be some element of heart failure.  He certainly has some aspect of obesity hypoventilation syndrome.  CTA failed to show any pulmonary embolism however PA diameter is 4.8 cm.  Heart rate and blood pressure well-controlled today in clinic.  He remains on 3 L oxygen since discharge from [facility] in 2024.  He unfortunately continues to smoke 5 cigarettes/day.  He has occasional chest pain.  No orthopnea or PND.  Edema is much improved.    The  "following portions of the patient's history were reviewed and updated as appropriate: allergies, current medications, past family history, past medical history, past social history, past surgical history and problem list.      Review of Systems   Constitutional: Negative for chills and fever.   HENT:  Negative for hoarse voice and sore throat.    Eyes:  Negative for double vision and photophobia.   Cardiovascular:  Positive for dyspnea on exertion. Negative for chest pain, leg swelling, near-syncope, orthopnea, palpitations, paroxysmal nocturnal dyspnea and syncope.   Respiratory:  Positive for shortness of breath. Negative for cough and wheezing.    Skin:  Negative for poor wound healing and rash.   Musculoskeletal:  Negative for arthritis and joint swelling.   Gastrointestinal:  Negative for bloating, abdominal pain, hematemesis and hematochezia.   Neurological:  Negative for dizziness and focal weakness.   Psychiatric/Behavioral:  Negative for depression and suicidal ideas.        OBJECTIVE:   Vital Signs  Vitals:    02/10/25 1251   BP: 126/80   Pulse: 86   SpO2: 93%     Estimated body mass index is 53.1 kg/m² as calculated from the following:    Height as of this encounter: 175.3 cm (69\").    Weight as of this encounter: 163 kg (359 lb 9.6 oz).    Vitals reviewed.   Constitutional:       Appearance: Not in distress. Chronically ill-appearing.   Neck:      Comments: JVD difficult to assess given body habitus  Pulmonary:      Breath sounds: No wheezing. No rhonchi. No rales.      Comments: Poor air entry bilateral zones with no adventitious breath sounds appreciated  Chest:      Chest wall: Not tender to palpatation.   Cardiovascular:      PMI at left midclavicular line. Normal rate. Regular rhythm. Normal S1. Normal S2.       Murmurs: There is no murmur.      No gallop.  No click. No rub.   Pulses:     Intact distal pulses.   Edema:     Thigh: bilateral trace edema of the thigh.     Pretibial: bilateral trace " edema of the pretibial area.     Ankle: bilateral trace edema of the ankle.     Feet: bilateral trace edema of the feet.  Abdominal:      General: Bowel sounds are normal.      Palpations: Abdomen is soft.      Tenderness: There is no abdominal tenderness.   Musculoskeletal: Normal range of motion.         General: No tenderness. Skin:     General: Skin is warm and dry.   Neurological:      General: No focal deficit present.      Mental Status: Alert and oriented to person, place and time.       Procedures    Lipid Panel          4/17/2024    09:38 6/17/2024    10:18 9/17/2024    10:38   Lipid Panel   Total Cholesterol 203  137  116    Triglycerides 122  117  122    HDL Cholesterol 40  32  31    VLDL Cholesterol 22  21  22    LDL Cholesterol  141  84  63         BUN   Date Value Ref Range Status   01/17/2025 23 (H) 6 - 20 mg/dL Final   12/30/2024 16 6 - 20 mg/dL Final     Creatinine   Date Value Ref Range Status   01/17/2025 1.01 0.76 - 1.27 mg/dL Final   12/30/2024 0.93 0.76 - 1.27 mg/dL Final     Potassium   Date Value Ref Range Status   01/17/2025 5.2 3.5 - 5.2 mmol/L Final   12/30/2024 4.1 3.5 - 5.2 mmol/L Final   12/23/2021 3.5 (L) 3.7 - 5.0 mmol/L Final     ALT (SGPT)   Date Value Ref Range Status   12/30/2024 15 1 - 41 U/L Final     AST (SGOT)   Date Value Ref Range Status   12/30/2024 19 1 - 40 U/L Final           ASSESSMENT:     48-year-old male with morbid obesity, COPD on home oxygen presents for acute on chronic diastolic heart failure      PLAN OF CARE:     Chronic diastolic heart failure -he is fairly euvolemic today but we will need to know how much LV pressures are contributing to what is apparently significant pulmonary hypertension by the imaging features on CTA.  We are going to plan for left and right heart catheterization given his recent complaints of chest pain.  COPD -Home oxygen dependence.  Details were discussed with Dr. Dougie Lang and we will use the right heart cath plus or minus  vasodilation results for further treatment recommendations.  History of tobacco abuse -encourage remained abstinence from nicotine products.  Morbid obesity -weight loss and exercise recommended  Hypertension -seemingly well-controlled at this time.  Mild concentric hypertrophy noted on echocardiogram.  Abnormal EKG -chronic right bundle branch block with no acute pathology on echocardiogram last month    Return to clinic 6 months           Discharge Medications            Accurate as of February 10, 2025 12:55 PM. If you have any questions, ask your nurse or doctor.                Continue These Medications        Instructions Start Date   atorvastatin 10 MG tablet  Commonly known as: LIPITOR   10 mg, Oral, Nightly      freestyle lancets   1 each, As Needed      ipratropium-albuterol 0.5-2.5 mg/3 ml nebulizer  Commonly known as: DUO-NEB   3 mL, Every 4 Hours PRN      Klayesta [id number] UNIT/GM powder  Generic drug: nystatin   2 Times Daily      lisinopril 10 MG tablet  Commonly known as: PRINIVIL,ZESTRIL   10 mg, Oral, Every 24 Hours Scheduled      metFORMIN 500 MG tablet  Commonly known as: GLUCOPHAGE   500 mg, Oral, Daily With Breakfast      torsemide 20 MG tablet  Commonly known as: DEMADEX   20 mg, Oral, Daily      Trelegy Ellipta 200-62.5-25 MCG/ACT inhaler  Generic drug: Fluticasone-Umeclidin-Vilant   1 puff, Inhalation, Daily - RT               Thank you for allowing me to participate in the care of your patient,      Sincerely,   Hermann Torres MD  [facility]  02/10/25  12:55 EST

## 2025-02-11 ENCOUNTER — TELEPHONE (OUTPATIENT)
Dept: FAMILY MEDICINE CLINIC | Facility: CLINIC | Age: 50
End: 2025-02-11
Payer: COMMERCIAL

## 2025-02-11 NOTE — TELEPHONE ENCOUNTER
----- Message from Inocencia Rojas sent at 1/25/2025 10:28 PM EST -----  Regarding: NEED CT  Need to order CT on Abdomen - abnormal aorta

## 2025-02-11 NOTE — TELEPHONE ENCOUNTER
Called patient and left VM message to discuss finding on prior CT scan. Requested that patient return my call at the office.  Awaiting patient to return call.

## 2025-02-12 ENCOUNTER — TELEPHONE (OUTPATIENT)
Dept: FAMILY MEDICINE CLINIC | Facility: CLINIC | Age: 50
End: 2025-02-12
Payer: COMMERCIAL

## 2025-02-12 NOTE — TELEPHONE ENCOUNTER
Called patient and requested that he return my call regarding ordering CT scan relating to abnormal imaging.  Awaiting return call from patient.

## 2025-02-13 ENCOUNTER — TELEPHONE (OUTPATIENT)
Dept: FAMILY MEDICINE CLINIC | Facility: CLINIC | Age: 50
End: 2025-02-13
Payer: COMMERCIAL

## 2025-02-13 DIAGNOSIS — R93.89 ABNORMAL COMPUTED TOMOGRAPHY ANGIOGRAPHY (CTA): Primary | ICD-10-CM

## 2025-02-13 NOTE — TELEPHONE ENCOUNTER
Called patient and discussed follow-up on abnormal finding on CTA dated 12/30/24 of Tortuous and ectatic appearance to the infrarenal abdominal aorta, incompletely imaged.  Recommended doing follow-up CT scan of abdominal aorta and patient agreed.  Order placed for CT Abdomen

## 2025-02-17 ENCOUNTER — TELEPHONE (OUTPATIENT)
Dept: FAMILY MEDICINE CLINIC | Facility: CLINIC | Age: 50
End: 2025-02-17

## 2025-02-17 DIAGNOSIS — R93.89 ABNORMAL COMPUTED TOMOGRAPHY ANGIOGRAPHY (CTA): Primary | ICD-10-CM

## 2025-02-17 NOTE — TELEPHONE ENCOUNTER
Called and advised patient that I have ordered an Ultrasound of Aorta to be done in place of CT to minimize use of contrast due to having upcoming Cardiac Cath.  Also informed patient that if Ultrasound does not provide good imaging, then we will order the CTA Abdomen to get a better image of the Infrarenal aorta and patient understood and agreed.

## 2025-02-17 NOTE — TELEPHONE ENCOUNTER
Hi Dr. Torres, Ultrasound dept said she will try to get a good image of the infrarenal aorta but no guarantee due to body habitus.  I will cancel the CT scan for now and order the Ultrasound Aorta and see what this image shows.  CT department indicated that due to body habitus, we must use contrast to be able to visualize aorta.  So, if the Ultrasound does not show a clear image of the infrarenal aorta, do you want the CTA Abd done before or after the Cardiac Cath?    Thank you for getting back to me.

## 2025-02-17 NOTE — TELEPHONE ENCOUNTER
Jamaica Torres,  I am DORIS Pro, at [facility].  I noticed that the CTA Chest was unable to visualize the tortuous & ectatic appearance to the infrarenal abdominal aorta.  I ordered a CT Abdomen w/contrast to be able to visualize this infrarenal abdominal aorta.  I am concerned about using too much contrast on this patient and duplicating imaging.  Is this CT Abdomen necessary if you are doing a Rt/Lt Cardiac Cath.  Will you also be looking at the infrarenal aorta?  Please let me know one way or the other if patient should go forward with the CT Abdomen w/contrast.  I will be seeing him today 2/17/25 and noticed in your note you were doing cath.  Thank you.

## 2025-02-19 NOTE — TELEPHONE ENCOUNTER
Okay, will follow-up on CTA Abdomen to evaluate infrarenal aorta after Cath, if Abd Ultrasound image is unclear.  Thank you

## 2025-02-25 ENCOUNTER — TELEPHONE (OUTPATIENT)
Dept: CARDIOLOGY | Age: 50
End: 2025-02-25

## 2025-02-25 NOTE — TELEPHONE ENCOUNTER
Caller: Josh Hinojosa    Relationship: Self    Best call back number: [phone]        What was the call regarding: PATIENT MISSED CALL TO SCHEDULE A HEART CATH PROCEDURE PLEASE CALL AND ADVISE.

## 2025-02-27 ENCOUNTER — HOSPITAL ENCOUNTER (OUTPATIENT)
Facility: HOSPITAL | Age: 50
Discharge: HOME | End: 2025-02-27
Admitting: NURSE PRACTITIONER
Payer: COMMERCIAL

## 2025-02-27 DIAGNOSIS — R93.89 ABNORMAL COMPUTED TOMOGRAPHY ANGIOGRAPHY (CTA): ICD-10-CM

## 2025-02-27 PROCEDURE — 76775 US EXAM ABDO BACK WALL LIM: CPT

## 2025-03-03 NOTE — TELEPHONE ENCOUNTER
Caller: Josh Hinojosa    Relationship: Self    Best call back number:[phone]    What is the best time to reach you: ANY    Who are you requesting to speak with (clinical staff, provider,  specific staff member): ANY      What was the call regarding: PATIENT STATED THAT HE WAS CALLING TO SCHEDULE HEART CATH PROCEDURE. PATIENT STATED THAT IF IT CAN BE SCHEDULED AND LEAVE DETAILED VOICE MESSAGE FOR HIM IF YOU CAN'T REACH HIM.

## 2025-03-07 ENCOUNTER — TRANSCRIBE ORDERS (OUTPATIENT)
Dept: CARDIOLOGY | Facility: CLINIC | Age: 50
End: 2025-03-07
Payer: COMMERCIAL

## 2025-03-07 DIAGNOSIS — Z01.810 PRE-OPERATIVE CARDIOVASCULAR EXAMINATION: ICD-10-CM

## 2025-03-07 DIAGNOSIS — Z13.6 SCREENING FOR ISCHEMIC HEART DISEASE: Primary | ICD-10-CM

## 2025-03-10 ENCOUNTER — LAB (OUTPATIENT)
Facility: HOSPITAL | Age: 50
End: 2025-03-10
Payer: COMMERCIAL

## 2025-03-10 DIAGNOSIS — Z01.810 PRE-OPERATIVE CARDIOVASCULAR EXAMINATION: ICD-10-CM

## 2025-03-10 DIAGNOSIS — Z13.6 SCREENING FOR ISCHEMIC HEART DISEASE: ICD-10-CM

## 2025-03-10 LAB
ANION GAP SERPL CALCULATED.3IONS-SCNC: 10.4 MMOL/L (ref 5–15)
BASOPHILS # BLD AUTO: 0.05 10*3/MM3 (ref 0–0.2)
BASOPHILS NFR BLD AUTO: 0.7 % (ref 0–1.5)
BUN SERPL-MCNC: 18 MG/DL (ref 6–20)
BUN/CREAT SERPL: 14.3 (ref 7–25)
CALCIUM SPEC-SCNC: 9.5 MG/DL (ref 8.6–10.5)
CHLORIDE SERPL-SCNC: 98 MMOL/L (ref 98–107)
CO2 SERPL-SCNC: 31.6 MMOL/L (ref 22–29)
CREAT SERPL-MCNC: 1.26 MG/DL (ref 0.76–1.27)
DEPRECATED RDW RBC AUTO: 44.3 FL (ref 37–54)
EGFRCR SERPLBLD CKD-EPI 2021: 70.4 ML/MIN/1.73
EOSINOPHIL # BLD AUTO: 0.2 10*3/MM3 (ref 0–0.4)
EOSINOPHIL NFR BLD AUTO: 2.8 % (ref 0.3–6.2)
ERYTHROCYTE [DISTWIDTH] IN BLOOD BY AUTOMATED COUNT: 14.1 % (ref 12.3–15.4)
GLUCOSE SERPL-MCNC: 113 MG/DL (ref 65–99)
HCT VFR BLD AUTO: 53.8 % (ref 37.5–51)
HGB BLD-MCNC: 17.8 G/DL (ref 13–17.7)
IMM GRANULOCYTES # BLD AUTO: 0.01 10*3/MM3 (ref 0–0.05)
IMM GRANULOCYTES NFR BLD AUTO: 0.1 % (ref 0–0.5)
LYMPHOCYTES # BLD AUTO: 2.25 10*3/MM3 (ref 0.7–3.1)
LYMPHOCYTES NFR BLD AUTO: 32 % (ref 19.6–45.3)
MCH RBC QN AUTO: 29.2 PG (ref 26.6–33)
MCHC RBC AUTO-ENTMCNC: 33.1 G/DL (ref 31.5–35.7)
MCV RBC AUTO: 88.2 FL (ref 79–97)
MONOCYTES # BLD AUTO: 0.56 10*3/MM3 (ref 0.1–0.9)
MONOCYTES NFR BLD AUTO: 8 % (ref 5–12)
NEUTROPHILS NFR BLD AUTO: 3.96 10*3/MM3 (ref 1.7–7)
NEUTROPHILS NFR BLD AUTO: 56.4 % (ref 42.7–76)
NRBC BLD AUTO-RTO: 0 /100 WBC (ref 0–0.2)
PLATELET # BLD AUTO: 219 10*3/MM3 (ref 140–450)
PMV BLD AUTO: 10.8 FL (ref 6–12)
POTASSIUM SERPL-SCNC: 5.3 MMOL/L (ref 3.5–5.2)
RBC # BLD AUTO: 6.1 10*6/MM3 (ref 4.14–5.8)
SODIUM SERPL-SCNC: 140 MMOL/L (ref 136–145)
WBC NRBC COR # BLD AUTO: 7.03 10*3/MM3 (ref 3.4–10.8)

## 2025-03-10 PROCEDURE — 80048 BASIC METABOLIC PNL TOTAL CA: CPT

## 2025-03-10 PROCEDURE — 36415 COLL VENOUS BLD VENIPUNCTURE: CPT

## 2025-03-10 PROCEDURE — 85025 COMPLETE CBC W/AUTO DIFF WBC: CPT

## 2025-03-12 ENCOUNTER — TELEPHONE (OUTPATIENT)
Dept: CARDIOLOGY | Facility: CLINIC | Age: 50
End: 2025-03-12
Payer: COMMERCIAL

## 2025-03-12 ENCOUNTER — HOSPITAL ENCOUNTER (OUTPATIENT)
Facility: HOSPITAL | Age: 50
Setting detail: HOSPITAL OUTPATIENT SURGERY
Discharge: HOME | End: 2025-03-12
Attending: INTERNAL MEDICINE | Admitting: INTERNAL MEDICINE
Payer: COMMERCIAL

## 2025-03-12 VITALS
SYSTOLIC BLOOD PRESSURE: 130 MMHG | HEIGHT: 69 IN | RESPIRATION RATE: 20 BRPM | HEART RATE: 74 BPM | DIASTOLIC BLOOD PRESSURE: 59 MMHG | TEMPERATURE: 97 F | BODY MASS INDEX: 46.65 KG/M2 | OXYGEN SATURATION: 90 % | WEIGHT: 315 LBS

## 2025-03-12 DIAGNOSIS — R07.2 PRECORDIAL PAIN: ICD-10-CM

## 2025-03-12 DIAGNOSIS — E11.65 TYPE 2 DIABETES MELLITUS WITH HYPERGLYCEMIA, WITHOUT LONG-TERM CURRENT USE OF INSULIN: ICD-10-CM

## 2025-03-12 DIAGNOSIS — I27.20 PULMONARY HYPERTENSION: ICD-10-CM

## 2025-03-12 LAB — GLUCOSE BLDC GLUCOMTR-MCNC: 100 MG/DL (ref 70–130)

## 2025-03-12 PROCEDURE — 82948 REAGENT STRIP/BLOOD GLUCOSE: CPT

## 2025-03-12 PROCEDURE — 25810000003 SODIUM CHLORIDE 0.9 % SOLUTION: Performed by: INTERNAL MEDICINE

## 2025-03-12 PROCEDURE — 93460 R&L HRT ART/VENTRICLE ANGIO: CPT | Performed by: INTERNAL MEDICINE

## 2025-03-12 PROCEDURE — 82810 BLOOD GASES O2 SAT ONLY: CPT

## 2025-03-12 PROCEDURE — C1769 GUIDE WIRE: HCPCS | Performed by: INTERNAL MEDICINE

## 2025-03-12 PROCEDURE — C1894 INTRO/SHEATH, NON-LASER: HCPCS | Performed by: INTERNAL MEDICINE

## 2025-03-12 PROCEDURE — 25010000002 FENTANYL CITRATE (PF) 50 MCG/ML SOLUTION: Performed by: INTERNAL MEDICINE

## 2025-03-12 PROCEDURE — 25010000002 HEPARIN (PORCINE) PER 1000 UNITS: Performed by: INTERNAL MEDICINE

## 2025-03-12 PROCEDURE — 85014 HEMATOCRIT: CPT

## 2025-03-12 PROCEDURE — 85018 HEMOGLOBIN: CPT

## 2025-03-12 PROCEDURE — 25010000002 LIDOCAINE 2% SOLUTION: Performed by: INTERNAL MEDICINE

## 2025-03-12 PROCEDURE — 25510000001 IOPAMIDOL PER 1 ML: Performed by: INTERNAL MEDICINE

## 2025-03-12 PROCEDURE — 25010000002 MIDAZOLAM PER 1 MG: Performed by: INTERNAL MEDICINE

## 2025-03-12 RX ORDER — NITROGLYCERIN 0.4 MG/1
0.4 TABLET SUBLINGUAL
Status: CANCELLED | OUTPATIENT
Start: 2025-03-12

## 2025-03-12 RX ORDER — SODIUM CHLORIDE 9 MG/ML
75 INJECTION, SOLUTION INTRAVENOUS CONTINUOUS
Status: DISCONTINUED | OUTPATIENT
Start: 2025-03-12 | End: 2025-03-12 | Stop reason: HOSPADM

## 2025-03-12 RX ORDER — IOPAMIDOL 755 MG/ML
INJECTION, SOLUTION INTRAVASCULAR
Status: DISCONTINUED | OUTPATIENT
Start: 2025-03-12 | End: 2025-03-12 | Stop reason: HOSPADM

## 2025-03-12 RX ORDER — ACETAMINOPHEN 325 MG/1
650 TABLET ORAL EVERY 4 HOURS PRN
Status: CANCELLED | OUTPATIENT
Start: 2025-03-12

## 2025-03-12 RX ORDER — LIDOCAINE HYDROCHLORIDE 20 MG/ML
INJECTION, SOLUTION INFILTRATION; PERINEURAL
Status: DISCONTINUED | OUTPATIENT
Start: 2025-03-12 | End: 2025-03-12 | Stop reason: HOSPADM

## 2025-03-12 RX ORDER — VERAPAMIL HYDROCHLORIDE 2.5 MG/ML
INJECTION, SOLUTION INTRAVENOUS
Status: DISCONTINUED | OUTPATIENT
Start: 2025-03-12 | End: 2025-03-12 | Stop reason: HOSPADM

## 2025-03-12 RX ORDER — FENTANYL CITRATE 50 UG/ML
INJECTION, SOLUTION INTRAMUSCULAR; INTRAVENOUS
Status: DISCONTINUED | OUTPATIENT
Start: 2025-03-12 | End: 2025-03-12 | Stop reason: HOSPADM

## 2025-03-12 RX ORDER — HEPARIN SODIUM 1000 [USP'U]/ML
INJECTION, SOLUTION INTRAVENOUS; SUBCUTANEOUS
Status: DISCONTINUED | OUTPATIENT
Start: 2025-03-12 | End: 2025-03-12 | Stop reason: HOSPADM

## 2025-03-12 RX ORDER — MIDAZOLAM HYDROCHLORIDE 1 MG/ML
INJECTION, SOLUTION INTRAMUSCULAR; INTRAVENOUS
Status: DISCONTINUED | OUTPATIENT
Start: 2025-03-12 | End: 2025-03-12 | Stop reason: HOSPADM

## 2025-03-12 RX ADMIN — SODIUM CHLORIDE 75 ML/HR: 9 INJECTION, SOLUTION INTRAVENOUS at 08:45

## 2025-03-12 NOTE — Clinical Note
Hemostasis started on the right brachial vein. Manual pressure applied to vessel. Manual pressure was held by Cathleen GARCIA Manual pressure was held for 5 min. Hemostasis achieved successfully. Closure device additional comment: 4x4 and tegaderm

## 2025-03-12 NOTE — TELEPHONE ENCOUNTER
Spoke to patient about cath results.  Normal coronaries with pulmonary hypertension and elevated LVEDP.  I asked him to take an extra dose of torsemide for 1 week and then call me with symptoms.  He claims that he is compliant with CPAP therapy.  We will monitor clinical progress closely.

## 2025-03-12 NOTE — DISCHARGE INSTRUCTIONS
[facility]  [address] [place] [address]    Coronary Angiogram (Radial/Ulnar Approach) After Care    Refer to this sheet in the next few weeks. These instructions provide you with information on caring for yourself after your procedure. Your caregiver may also give you more specific instructions. Your treatment has been planned according to current medical practices, but problems sometimes occur. Call your caregiver if you have any problems or questions after your procedure.    Home Care Instructions:  You may shower the day after the procedure. Remove the bandage (dressing) and gently wash the site with plain soap and water. Gently pat the site dry. You may apply a band aid daily for 2 days if desired.    Do not apply powder or lotion to the site.  Do not submerge the affected site in water for 3 to 5 days or until the site is completely healed.   Do not lift, push or pull anything over 5 pounds for 5 days after your procedure or as directed by your physician.  As a reference, a gallon of milk weighs 8 pounds.   Inspect the site at least twice daily. You may notice some bruising at the site and it may be tender for 1 to 2 weeks.     Increase your fluid intake for the next 2 days.    Keep arm elevated for 24 hours. For the remainder of the day, keep your arm in “Pledge of Allegiance” position when up and about.     You may drive 24 hours after the procedure unless otherwise instructed by your caregiver.  Do not operate machinery or power tools for 24 hours.  A responsible adult should be with you for the first 24 hours after you arrive home. Do not make any important legal decisions or sign legal papers for 24 hours.  Do not drink alcohol for 24 hours.    Metformin or any medications containing Metformin should not be taken for 48 hours after your procedure.      Call Your Doctor if:   You have unusual pain at the radial/ulnar (wrist) site.  You have redness, warmth, swelling, or pain at the  radial/ulnar (wrist) site.  You have drainage (other than a small amount of blood on the dressing).  `You have chills or a fever > 101.  Your arm becomes pale or dark, cool, tingly, or numb.  You develop chest pain, shortness of breath, feel faint or pass out.    You have heavy bleeding from the site, hold pressure on the site for 20 minutes.  If the bleeding stops, apply a fresh bandage and call your cardiologist.  However, if you        continue to have bleeding, call 911 and continue to apply pressure to the site.   You have any symptoms of a stroke.  Remember BE FAST  B-balance. Sudden trouble walking or loss of balance.  E-eyes.  Sudden changes in how you see or a sudden onset of a very bad headache.   F-face. Sudden weakness or loss of feeling of the face or facial droop on one side.   A-arms Sudden weakness or numbness in one arm.  One arm drifts down if they are both held out in front of you. This happens suddenly and usually on one side of the body.   S-speech.  Sudden trouble speaking, slurred speech or trouble understanding what are saying.   T-time  Time to call emergency services.  Write down the symptoms and the time they started.

## 2025-03-12 NOTE — Clinical Note
Hemostasis started on the right radial artery. R-Band was used in achieving hemostasis. Radial compression device applied to vessel. Hemostasis achieved successfully. Closure device additional comment: Tr band with 16 cc of air

## 2025-03-14 LAB
HCT VFR BLDA CALC: 53 % (ref 38–51)
HCT VFR BLDA CALC: 54 % (ref 38–51)
HGB BLDA-MCNC: 18 G/DL (ref 12–17)
HGB BLDA-MCNC: 18.4 G/DL (ref 12–17)
SAO2 % BLDA: 62 % (ref 95–98)
SAO2 % BLDA: 90 % (ref 95–98)

## 2025-03-18 ENCOUNTER — TELEPHONE (OUTPATIENT)
Dept: FAMILY MEDICINE CLINIC | Facility: CLINIC | Age: 50
End: 2025-03-18
Payer: COMMERCIAL

## 2025-07-07 DIAGNOSIS — E11.65 TYPE 2 DIABETES MELLITUS WITH HYPERGLYCEMIA, WITHOUT LONG-TERM CURRENT USE OF INSULIN: ICD-10-CM

## 2025-08-27 ENCOUNTER — OFFICE VISIT (OUTPATIENT)
Dept: CARDIOLOGY | Age: 50
End: 2025-08-27
Payer: COMMERCIAL

## 2025-08-27 VITALS
WEIGHT: 315 LBS | DIASTOLIC BLOOD PRESSURE: 60 MMHG | SYSTOLIC BLOOD PRESSURE: 110 MMHG | HEIGHT: 69 IN | HEART RATE: 66 BPM | BODY MASS INDEX: 46.65 KG/M2

## 2025-08-27 DIAGNOSIS — R07.2 PRECORDIAL PAIN: ICD-10-CM

## 2025-08-27 DIAGNOSIS — I45.10 RBBB: ICD-10-CM

## 2025-08-27 DIAGNOSIS — I50.32 CHRONIC DIASTOLIC CHF (CONGESTIVE HEART FAILURE): ICD-10-CM

## 2025-08-27 DIAGNOSIS — I27.20 PULMONARY HYPERTENSION: Primary | ICD-10-CM

## 2025-08-27 PROCEDURE — 99214 OFFICE O/P EST MOD 30 MIN: CPT | Performed by: NURSE PRACTITIONER

## 2025-08-27 PROCEDURE — 1159F MED LIST DOCD IN RCRD: CPT | Performed by: NURSE PRACTITIONER

## 2025-08-27 PROCEDURE — 3078F DIAST BP <80 MM HG: CPT | Performed by: NURSE PRACTITIONER

## 2025-08-27 PROCEDURE — 3074F SYST BP LT 130 MM HG: CPT | Performed by: NURSE PRACTITIONER

## 2025-08-27 PROCEDURE — 93000 ELECTROCARDIOGRAM COMPLETE: CPT | Performed by: NURSE PRACTITIONER

## 2025-08-27 PROCEDURE — 1160F RVW MEDS BY RX/DR IN RCRD: CPT | Performed by: NURSE PRACTITIONER

## (undated) DEVICE — TR BAND RADIAL ARTERY COMPRESSION DEVICE: Brand: TR BAND

## (undated) DEVICE — TBG INJ CONTRL PVCCLR RIGD RA 1200PSI 10

## (undated) DEVICE — GLIDESHEATH SLENDER STAINLESS STEEL KIT: Brand: GLIDESHEATH SLENDER

## (undated) DEVICE — FEMORAL ENTRY ANGIOGRAPHY SHIELD-YELLOW: Brand: RADPAD

## (undated) DEVICE — GW HITORQUE/BAL MID/WT J W/HCOAT .014 3X190CM

## (undated) DEVICE — LOU PACE DEFIB: Brand: MEDLINE INDUSTRIES, INC.

## (undated) DEVICE — CATH VENT MIV RADL PIG ST TIP 5F 110CM

## (undated) DEVICE — DGW .035 FC J3MM 260CM TEF: Brand: EMERALD

## (undated) DEVICE — PK CATH CARD 40

## (undated) DEVICE — CATH DIAG IMPULSE FR4 5F 100CM

## (undated) DEVICE — KT MANIFLD CARDIAC

## (undated) DEVICE — BALN CATH PRESS WEDGE 6F 110CM

## (undated) DEVICE — CATH DIAG IMPULSE FL3.5 5F 100CM